# Patient Record
Sex: MALE | Race: WHITE | NOT HISPANIC OR LATINO
[De-identification: names, ages, dates, MRNs, and addresses within clinical notes are randomized per-mention and may not be internally consistent; named-entity substitution may affect disease eponyms.]

---

## 2019-01-29 ENCOUNTER — TRANSCRIPTION ENCOUNTER (OUTPATIENT)
Age: 54
End: 2019-01-29

## 2020-07-21 PROBLEM — Z00.00 ENCOUNTER FOR PREVENTIVE HEALTH EXAMINATION: Status: ACTIVE | Noted: 2020-07-21

## 2020-07-22 DIAGNOSIS — U07.1 COVID-19: ICD-10-CM

## 2020-08-18 LAB — SARS-COV-2 N GENE NPH QL NAA+PROBE: NOT DETECTED

## 2020-08-20 ENCOUNTER — APPOINTMENT (OUTPATIENT)
Dept: NEUROLOGY | Facility: CLINIC | Age: 55
End: 2020-08-20
Payer: COMMERCIAL

## 2020-08-20 PROCEDURE — 95911 NRV CNDJ TEST 9-10 STUDIES: CPT

## 2020-08-20 PROCEDURE — 95886 MUSC TEST DONE W/N TEST COMP: CPT

## 2020-09-25 ENCOUNTER — APPOINTMENT (OUTPATIENT)
Dept: ENDOCRINOLOGY | Facility: CLINIC | Age: 55
End: 2020-09-25
Payer: COMMERCIAL

## 2020-09-25 VITALS
DIASTOLIC BLOOD PRESSURE: 70 MMHG | HEIGHT: 72 IN | SYSTOLIC BLOOD PRESSURE: 110 MMHG | BODY MASS INDEX: 22.89 KG/M2 | TEMPERATURE: 97.3 F | WEIGHT: 169 LBS

## 2020-09-25 DIAGNOSIS — Z86.39 PERSONAL HISTORY OF OTHER ENDOCRINE, NUTRITIONAL AND METABOLIC DISEASE: ICD-10-CM

## 2020-09-25 DIAGNOSIS — Z87.39 PERSONAL HISTORY OF OTHER DISEASES OF THE MUSCULOSKELETAL SYSTEM AND CONNECTIVE TISSUE: ICD-10-CM

## 2020-09-25 DIAGNOSIS — Z78.9 OTHER SPECIFIED HEALTH STATUS: ICD-10-CM

## 2020-09-25 DIAGNOSIS — Z83.3 FAMILY HISTORY OF DIABETES MELLITUS: ICD-10-CM

## 2020-09-25 DIAGNOSIS — Z86.79 PERSONAL HISTORY OF OTHER DISEASES OF THE CIRCULATORY SYSTEM: ICD-10-CM

## 2020-09-25 DIAGNOSIS — Z80.9 FAMILY HISTORY OF MALIGNANT NEOPLASM, UNSPECIFIED: ICD-10-CM

## 2020-09-25 PROCEDURE — 99205 OFFICE O/P NEW HI 60 MIN: CPT

## 2020-09-25 NOTE — HISTORY OF PRESENT ILLNESS
[FreeTextEntry1] : quality: dm \par onset 2010\par severity: moderate \par modifying factors: diet helps and exercise \par associated factors: neuropathy , retinopathy , ED \par

## 2020-09-25 NOTE — ASSESSMENT
[FreeTextEntry1] : Diabetes mellitus severely uncontrolled in patient with longstanding diabetes for 10 years, treated with multiple oral agents and lantus and now stopped completely. His bgs are all 250-300 and above. No weight loss and now blurry vision. He has proliferative / preproliferative diabetic retinopathy and he is getting injection with avastin. \par he has significant UE and LE neuropathy. \par he stopped lantus because it gave him skin numbness and he could not tolerate the feeling. \par test labs today\par start checking Bgs 4x day \par gluccometer, supplies will give scripts. \par send logbook in 1 week and followup again with us 4 weeks\par check CMP stat to rule out acidoses \par test FELA and ICA and C peptide to rule out type 1 dm \par check lipids. \par start tresiba  10 u HS and increase by 2 units daily till morning bgs 140-150. \par discussed adding meal insulin TID \par discussed different  types of insulin \par discussed diet and exercise\par encouraged more exercise walking 30 min 3 x week\par Discussed complications of diabetes at length including MI/CVA/ESRD/dialysis/blindness/amputations/infections\par Needs to try to have more protein for meals.\par Importance of blood glucose monitoring discussed. Targets reviewed. Recommended pt try to keep blood glucose level below 130 (110) before meals and below 160 (140) two hours after meals.\par stop all supplements from [facility] \par check GFR\par eduard/ c ratio \par flushot recommended \par pt warned that labs are stat and if suggestive of DKA will call to end him to ER \par feet exam : diabetic dermopathy with absent vibration from ankle down B/L and minimal monofilament B/L. \par PDR : followup with ophthalmology/retinal specialist

## 2020-09-25 NOTE — PHYSICAL EXAM
[Alert] : alert [Well Nourished] : well nourished [No Acute Distress] : no acute distress [Well Developed] : well developed [Normal Sclera/Conjunctiva] : normal sclera/conjunctiva [EOMI] : extra ocular movement intact [No Proptosis] : no proptosis [Normal Oropharynx] : the oropharynx was normal [Thyroid Not Enlarged] : the thyroid was not enlarged [No Thyroid Nodules] : no palpable thyroid nodules [No Respiratory Distress] : no respiratory distress [No Accessory Muscle Use] : no accessory muscle use [Clear to Auscultation] : lungs were clear to auscultation bilaterally [Normal S1, S2] : normal S1 and S2 [Normal Rate] : heart rate was normal [Regular Rhythm] : with a regular rhythm [No Edema] : no peripheral edema [Pedal Pulses Normal] : the pedal pulses are present [Normal Bowel Sounds] : normal bowel sounds [Not Tender] : non-tender [Not Distended] : not distended [Soft] : abdomen soft [Normal Anterior Cervical Nodes] : no anterior cervical lymphadenopathy [Normal Posterior Cervical Nodes] : no posterior cervical lymphadenopathy [Spine Straight] : spine straight [No Stigmata of Cushings Syndrome] : no stigmata of Cushings Syndrome [Normal Gait] : normal gait [No Rash] : no rash [Normal Reflexes] : deep tendon reflexes were 2+ and symmetric [No Tremors] : no tremors [Oriented x3] : oriented to person, place, and time [Acanthosis Nigricans] : no acanthosis nigricans [de-identified] : R thumb muscle atrophy

## 2020-09-25 NOTE — REVIEW OF SYSTEMS
[Pain/Numbness of Digits] : pain/numbness of digits [Poor Balance] : poor balance [Negative] : Heme/Lymph [de-identified] : healed blisters fingers, crusted

## 2020-09-28 LAB
ALBUMIN SERPL ELPH-MCNC: 4.8 G/DL
ALP BLD-CCNC: 101 U/L
ALT SERPL-CCNC: 25 U/L
ANION GAP SERPL CALC-SCNC: 15 MMOL/L
AST SERPL-CCNC: 17 U/L
BASOPHILS # BLD AUTO: 0.04 K/UL
BASOPHILS NFR BLD AUTO: 0.6 %
BILIRUB SERPL-MCNC: 0.8 MG/DL
BUN SERPL-MCNC: 27 MG/DL
C PEPTIDE SERPL-MCNC: 1.2 NG/ML
CALCIUM SERPL-MCNC: 10 MG/DL
CHLORIDE SERPL-SCNC: 95 MMOL/L
CHOLEST SERPL-MCNC: 259 MG/DL
CHOLEST/HDLC SERPL: 4.9 RATIO
CO2 SERPL-SCNC: 26 MMOL/L
CREAT SERPL-MCNC: 1.12 MG/DL
CREAT SPEC-SCNC: 77 MG/DL
EOSINOPHIL # BLD AUTO: 0.21 K/UL
EOSINOPHIL NFR BLD AUTO: 3 %
ESTIMATED AVERAGE GLUCOSE: 315 MG/DL
GLUCOSE SERPL-MCNC: 361 MG/DL
HBA1C MFR BLD HPLC: 12.6 %
HCT VFR BLD CALC: 39.2 %
HDLC SERPL-MCNC: 53 MG/DL
HGB BLD-MCNC: 13.5 G/DL
IMM GRANULOCYTES NFR BLD AUTO: 0.3 %
LDLC SERPL CALC-MCNC: 173 MG/DL
LDLC SERPL DIRECT ASSAY-MCNC: 182 MG/DL
LYMPHOCYTES # BLD AUTO: 1.09 K/UL
LYMPHOCYTES NFR BLD AUTO: 15.5 %
MAN DIFF?: NORMAL
MCHC RBC-ENTMCNC: 30.5 PG
MCHC RBC-ENTMCNC: 34.4 GM/DL
MCV RBC AUTO: 88.7 FL
MICROALBUMIN 24H UR DL<=1MG/L-MCNC: 19.4 MG/DL
MICROALBUMIN/CREAT 24H UR-RTO: 251 MG/G
MONOCYTES # BLD AUTO: 0.55 K/UL
MONOCYTES NFR BLD AUTO: 7.8 %
NEUTROPHILS # BLD AUTO: 5.11 K/UL
NEUTROPHILS NFR BLD AUTO: 72.8 %
PLATELET # BLD AUTO: 163 K/UL
POTASSIUM SERPL-SCNC: 4.3 MMOL/L
PROT SERPL-MCNC: 6.7 G/DL
RBC # BLD: 4.42 M/UL
RBC # FLD: 12.2 %
SODIUM SERPL-SCNC: 136 MMOL/L
TRIGL SERPL-MCNC: 162 MG/DL
WBC # FLD AUTO: 7.02 K/UL

## 2020-09-28 RX ORDER — BLOOD SUGAR DIAGNOSTIC
STRIP MISCELLANEOUS
Qty: 4 | Refills: 1 | Status: DISCONTINUED | COMMUNITY
Start: 2020-09-25 | End: 2020-09-28

## 2020-09-30 LAB
GAD65 AB SER-MCNC: 0 NMOL/L
PANC ISLET CELL AB SER QL: NORMAL

## 2020-10-29 ENCOUNTER — APPOINTMENT (OUTPATIENT)
Dept: ENDOCRINOLOGY | Facility: CLINIC | Age: 55
End: 2020-10-29
Payer: COMMERCIAL

## 2020-10-29 PROCEDURE — 99214 OFFICE O/P EST MOD 30 MIN: CPT | Mod: 95

## 2020-10-29 NOTE — REVIEW OF SYSTEMS
[Blurred Vision] : blurred vision [Pain/Numbness of Digits] : pain/numbness of digits [Recent Weight Gain (___ Lbs)] : no recent weight gain [Recent Weight Loss (___ Lbs)] : no recent weight loss [Chest Pain] : no chest pain [Palpitations] : no palpitations [Shortness Of Breath] : no shortness of breath [Nausea] : no nausea [Abdominal Pain] : no abdominal pain [Vomiting] : no vomiting [Polyuria] : no polyuria [Polydipsia] : no polydipsia

## 2020-10-29 NOTE — HISTORY OF PRESENT ILLNESS
[FreeTextEntry1] : This visit was provided via telehealth using real-time 2-way audio visual technology. The patient, HAILEY CARD , was located in his car at [address] [place] [address] , at the time of the visit. \par The provider, MICHAEL PATEL, was located at the medical office located in [place], [place] at the time of the visit. The patient, HAILEY CARD and Provider participated in the telehealth encounter. \par Verbal consent given on 10/29/2020 by the patient. \par \par Telehealth visit performed due to COVID-19 Pandemic.\par Time started: 11:20\par Time Ended:  11:42\par  \par Visit converted to telehealth because patient reported travel to [place] within the past two weeks.\par In [place] Oct 5-23 and unable to get insulin while he was away. Resumed Tresiba last Saturday. Only ever took 2 doses of Humalog because he said the pen was jammed.\par Nerve damage in right hand- difficulty closing hand, weak , dropping things x 1 year- planning on surgery but needs to improve BG first.\par \par Quality: type 2\par Onset: 2010\par Severity: severe\par  \par Modifying factors: \par Tresiba 20 units daily\par Humalog 10 units AC before lunch (largest meal)\par \par Diet: breakfast- fruit, lunch is largest meal (sandwich or potato salad), dinner eats late (meatballs).\par Exercise: none\par Weight: stable, fluctuates +/- 5 pounds\par  \par Associated factors: neuropathy , retinopathy , ED \par Eye exam: +PDR, getting Avastin injections. Has follow-up next week for laser therapy. Floaters/blurry vision is improving.\par Foot exam: +neuropathy. symptoms improving since starting insulin\par Heart: none\par Kidney: none\par \par SMBG 1-2x daily, fasting in AM and after meals if not feeling well.\par FBG 190s to mid-200s\par PPBG 330s about one hour after eating.\par

## 2020-10-29 NOTE — ASSESSMENT
[FreeTextEntry1] : 55 year old male with long standing diabetes, type 1 vs type 2. FELA negative but low insulin production. Also with FH (father) with diabetes. BMI normal, was never overweight. Glycemic control is poor and he needs to continue with insulin. Also with associated neuropathy and retinopathy. Needs surgery on left hand but will need to improve glycemic control first.\par \par 1. DM- Type 1 vs type 2\par -Reviewed risks/complications of uncontrolled blood glucose.\par -Continue Tresiba 20 units daily.\par -Resume Humalog 10 units before lunch. Start 5 units before dinner.\par -Continue SMBG but increase to 4x daily, fasting and before each insulin injection (lunch, dinner, and bedtime).\par -Send glucose logs in one week for further insulin adjustments.\par -Reviewed signs/symptoms/treatment of hypoglycemia.\par -Continue follow-up with opthalmology.\par -Repeat A1c and RTO for follow-up in 6 weeks.\par -Would benefit from appointment with CDE in the future.\par \par 2. Hyperlipidemia\par -Continue statin.\par -Repeat lipids in 3 months.

## 2020-11-19 ENCOUNTER — NON-APPOINTMENT (OUTPATIENT)
Age: 55
End: 2020-11-19

## 2020-11-19 RX ORDER — INSULIN LISPRO 100 U/ML
100 INJECTION, SOLUTION SUBCUTANEOUS
Qty: 2 | Refills: 0 | Status: DISCONTINUED | COMMUNITY
Start: 2020-11-19 | End: 2020-11-19

## 2020-11-24 ENCOUNTER — TRANSCRIPTION ENCOUNTER (OUTPATIENT)
Age: 55
End: 2020-11-24

## 2020-12-16 ENCOUNTER — NON-APPOINTMENT (OUTPATIENT)
Age: 55
End: 2020-12-16

## 2021-02-03 ENCOUNTER — APPOINTMENT (OUTPATIENT)
Dept: ENDOCRINOLOGY | Facility: CLINIC | Age: 56
End: 2021-02-03

## 2021-02-16 ENCOUNTER — APPOINTMENT (OUTPATIENT)
Dept: ENDOCRINOLOGY | Facility: CLINIC | Age: 56
End: 2021-02-16
Payer: COMMERCIAL

## 2021-02-16 VITALS
WEIGHT: 177 LBS | SYSTOLIC BLOOD PRESSURE: 112 MMHG | DIASTOLIC BLOOD PRESSURE: 74 MMHG | TEMPERATURE: 96.7 F | HEIGHT: 72 IN | BODY MASS INDEX: 23.98 KG/M2

## 2021-02-16 DIAGNOSIS — E10.40 TYPE 1 DIABETES MELLITUS WITH DIABETIC NEUROPATHY, UNSPECIFIED: ICD-10-CM

## 2021-02-16 DIAGNOSIS — E10.311: ICD-10-CM

## 2021-02-16 DIAGNOSIS — E10.65 TYPE 1 DIABETES MELLITUS WITH DIABETIC NEUROPATHY, UNSPECIFIED: ICD-10-CM

## 2021-02-16 PROCEDURE — 99072 ADDL SUPL MATRL&STAF TM PHE: CPT

## 2021-02-16 PROCEDURE — 99214 OFFICE O/P EST MOD 30 MIN: CPT

## 2021-02-16 RX ORDER — BLOOD-GLUCOSE METER
W/DEVICE EACH MISCELLANEOUS
Qty: 1 | Refills: 0 | Status: ACTIVE | COMMUNITY
Start: 2020-09-28 | End: 1900-01-01

## 2021-02-16 RX ORDER — LANCETS 33 GAUGE
EACH MISCELLANEOUS
Qty: 4 | Refills: 1 | Status: ACTIVE | COMMUNITY
Start: 2020-09-28 | End: 1900-01-01

## 2021-02-16 NOTE — HISTORY OF PRESENT ILLNESS
[FreeTextEntry1] : Was checking blood sugars often but left meter in [place]. Pt was in [place] for a month and returned 2 weeks ago.\par \par Quality: type 2\par Onset: 2010\par Severity: severe\par Modifying factors: on insulin\par \par SMBG\par Checks blood sugars on average 3x a day\par Fasting on average 180-220\par On average post prandial 2-3 hours after 250s-300s\par \par Current drug regimen\par Tresiba 20 units QHS \par Humalog 3-5 units before each meal  (went to [place] without taking this) \par \par **admits to often forgetting the meal insulin because he eats out a lot"\par \par Eye exam: goes monthly for injections +DR\par Foot exam: does not see a podiatry- endorses numbness/tingling in b/l feet \par Kidney disease: denies\par Heart disease:denies\par \par Weight: gaining approx 10 lbs since September \par Diet: indulged in the last month drinking and eating while on vacation in [place]\par admits to binge eating\par B: doesn’t always eat breakfast \par L: BLT, hamburger on lettuce\par D: whole wheat pasta, steak/salad/baked potatoe\par S: admits this is his downfall- potato chips and dips\par Drinks: enjoys beer\par Exercise: does not exercise \par Smoking: denies\par \par HLD\par Need updated lipid panel\par Atorvastatin 20 mg daily

## 2021-02-16 NOTE — ASSESSMENT
[FreeTextEntry1] : T2DM\par -Long discussion had with patient regarding insulin action times. He has often been taking his humalog hours after his meal because he forgot to bring the pen to the restaurant. \par -For now, blood sugars not at goal\par -Increase Tresiba to 25 units QHS\par -Continue Humalog 3-5 units pre meal, must take 100% of the time 10-15 mins before meals, not after meals\par -Watch diet and the alcohol consumption as they make blood sugars fluctuate\par -Increase exercise as tolerated, goal of 30 mins a day 5x a week\par -Continue to follow up with all specialists including ophtho\par \par HLD\par -Continue statin daily, need updated lipid panel\par \par \par Fasting labs due asap\par RTO 3 months

## 2021-02-16 NOTE — REVIEW OF SYSTEMS
[Recent Weight Gain (___ Lbs)] : recent weight gain: [unfilled] lbs [Visual Field Defect] : visual field defect [Blurred Vision] : blurred vision [SOB on Exertion] : shortness of breath on exertion [Hesistancy] : hesitancy [Fatigue] : no fatigue [Dysphagia] : no dysphagia [Dysphonia] : no dysphonia [Chest Pain] : no chest pain [Constipation] : no constipation [Diarrhea] : no diarrhea [Polyuria] : no polyuria [Polydipsia] : no polydipsia

## 2021-03-10 ENCOUNTER — NON-APPOINTMENT (OUTPATIENT)
Age: 56
End: 2021-03-10

## 2021-04-13 ENCOUNTER — APPOINTMENT (OUTPATIENT)
Dept: ENDOCRINOLOGY | Facility: CLINIC | Age: 56
End: 2021-04-13
Payer: COMMERCIAL

## 2021-04-13 VITALS
HEIGHT: 72 IN | SYSTOLIC BLOOD PRESSURE: 110 MMHG | DIASTOLIC BLOOD PRESSURE: 70 MMHG | BODY MASS INDEX: 24.11 KG/M2 | WEIGHT: 178 LBS

## 2021-04-13 PROCEDURE — 99214 OFFICE O/P EST MOD 30 MIN: CPT

## 2021-04-13 PROCEDURE — 99072 ADDL SUPL MATRL&STAF TM PHE: CPT

## 2021-04-13 RX ORDER — BLOOD SUGAR DIAGNOSTIC
STRIP MISCELLANEOUS
Qty: 4 | Refills: 1 | Status: ACTIVE | COMMUNITY
Start: 2020-09-28 | End: 1900-01-01

## 2021-04-13 NOTE — ASSESSMENT
[FreeTextEntry1] : T2DM\par -Long discussion had with patient regarding the need for tight diabetes control both for short term  healing of wounds and long term prevention of further DM complications \par -For now, blood sugars not at goal\par -Increase FS frequency to 3-4x a day and drop off logs in 2 weeks\par -Increase Tresiba to 26 units QHS\par -Increase Humalog 8 units pre meal, must take 100% of the time 10-15 mins before meals, not after meals\par -Watch diet and the alcohol consumption as they make blood sugars fluctuate\par -Increase exercise as tolerated, goal of 30 mins a day 5x a week when cleared\par -Continue to follow up with all specialists including ophtho and podiatry\par \par HLD\par -Continue statin daily, need updated lipid panel\par \par \par Fasting labs due asap\par RTO 3 months

## 2021-04-13 NOTE — PHYSICAL EXAM
[Alert] : alert [Well Nourished] : well nourished [No Acute Distress] : no acute distress [Normal Sclera/Conjunctiva] : normal sclera/conjunctiva [Normal Hearing] : hearing was normal [Thyroid Not Enlarged] : the thyroid was not enlarged [No Accessory Muscle Use] : no accessory muscle use [Clear to Auscultation] : lungs were clear to auscultation bilaterally [Normal S1, S2] : normal S1 and S2 [Normal Rate] : heart rate was normal [No Edema] : no peripheral edema [Not Tender] : non-tender [Soft] : abdomen soft [Normal Gait] : normal gait [No Rash] : no rash [No Tremors] : no tremors [Oriented x3] : oriented to person, place, and time [Foot Ulcers] : foot ulcers present
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
## 2021-05-06 ENCOUNTER — LABORATORY RESULT (OUTPATIENT)
Age: 56
End: 2021-05-06

## 2021-05-10 LAB
25(OH)D3 SERPL-MCNC: 27.5 NG/ML
ALBUMIN SERPL ELPH-MCNC: 4.4 G/DL
ALP BLD-CCNC: 111 U/L
ALT SERPL-CCNC: 21 U/L
ANION GAP SERPL CALC-SCNC: 11 MMOL/L
AST SERPL-CCNC: 18 U/L
BASOPHILS # BLD AUTO: 0.02 K/UL
BASOPHILS NFR BLD AUTO: 0.3 %
BILIRUB SERPL-MCNC: 0.8 MG/DL
BUN SERPL-MCNC: 24 MG/DL
CALCIUM SERPL-MCNC: 10.7 MG/DL
CHLORIDE SERPL-SCNC: 102 MMOL/L
CHOLEST SERPL-MCNC: 252 MG/DL
CO2 SERPL-SCNC: 27 MMOL/L
CREAT SERPL-MCNC: 1.33 MG/DL
CREAT SPEC-SCNC: 142 MG/DL
EOSINOPHIL # BLD AUTO: 0.13 K/UL
EOSINOPHIL NFR BLD AUTO: 2.2 %
ESTIMATED AVERAGE GLUCOSE: 246 MG/DL
GLUCOSE SERPL-MCNC: 91 MG/DL
HBA1C MFR BLD HPLC: 10.2 %
HCT VFR BLD CALC: 38.2 %
HDLC SERPL-MCNC: 60 MG/DL
HGB BLD-MCNC: 12.6 G/DL
IMM GRANULOCYTES NFR BLD AUTO: 0.3 %
LDLC SERPL CALC-MCNC: 175 MG/DL
LYMPHOCYTES # BLD AUTO: 1.21 K/UL
LYMPHOCYTES NFR BLD AUTO: 20.2 %
MAN DIFF?: NORMAL
MCHC RBC-ENTMCNC: 28.7 PG
MCHC RBC-ENTMCNC: 33 GM/DL
MCV RBC AUTO: 87 FL
MICROALBUMIN 24H UR DL<=1MG/L-MCNC: 77.2 MG/DL
MICROALBUMIN/CREAT 24H UR-RTO: 544 MG/G
MONOCYTES # BLD AUTO: 0.42 K/UL
MONOCYTES NFR BLD AUTO: 7 %
NEUTROPHILS # BLD AUTO: 4.19 K/UL
NEUTROPHILS NFR BLD AUTO: 70 %
NONHDLC SERPL-MCNC: 192 MG/DL
PLATELET # BLD AUTO: 198 K/UL
POTASSIUM SERPL-SCNC: 4.7 MMOL/L
PROT SERPL-MCNC: 6.7 G/DL
RBC # BLD: 4.39 M/UL
RBC # FLD: 12.9 %
SODIUM SERPL-SCNC: 140 MMOL/L
T3FREE SERPL-MCNC: 2.82 PG/ML
T4 FREE SERPL-MCNC: 1.2 NG/DL
TRIGL SERPL-MCNC: 87 MG/DL
TSH SERPL-ACNC: 3.4 UIU/ML
VIT B12 SERPL-MCNC: 449 PG/ML
WBC # FLD AUTO: 5.99 K/UL

## 2021-05-11 ENCOUNTER — LABORATORY RESULT (OUTPATIENT)
Age: 56
End: 2021-05-11

## 2021-05-13 ENCOUNTER — APPOINTMENT (OUTPATIENT)
Dept: ENDOCRINOLOGY | Facility: CLINIC | Age: 56
End: 2021-05-13

## 2021-05-26 ENCOUNTER — APPOINTMENT (OUTPATIENT)
Dept: ENDOCRINOLOGY | Facility: CLINIC | Age: 56
End: 2021-05-26
Payer: COMMERCIAL

## 2021-05-26 VITALS
WEIGHT: 178 LBS | BODY MASS INDEX: 24.11 KG/M2 | SYSTOLIC BLOOD PRESSURE: 110 MMHG | DIASTOLIC BLOOD PRESSURE: 70 MMHG | HEIGHT: 72 IN

## 2021-05-26 PROCEDURE — 99214 OFFICE O/P EST MOD 30 MIN: CPT

## 2021-05-26 PROCEDURE — 99072 ADDL SUPL MATRL&STAF TM PHE: CPT
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
## 2021-05-26 NOTE — PHYSICAL EXAM
[Alert] : alert [Well Nourished] : well nourished [No Acute Distress] : no acute distress [Normal Sclera/Conjunctiva] : normal sclera/conjunctiva [Normal Hearing] : hearing was normal [Thyroid Not Enlarged] : the thyroid was not enlarged [No Accessory Muscle Use] : no accessory muscle use [Clear to Auscultation] : lungs were clear to auscultation bilaterally [Normal S1, S2] : normal S1 and S2 [Normal Rate] : heart rate was normal [No Edema] : no peripheral edema [Not Tender] : non-tender [Soft] : abdomen soft [Normal Gait] : normal gait [No Rash] : no rash [Foot Ulcers] : foot ulcers present [No Tremors] : no tremors [Oriented x3] : oriented to person, place, and time

## 2021-05-26 NOTE — REVIEW OF SYSTEMS
Refill approved per Dr. Nolan Murry, sent to pharmacy. Informed pt, confirmed 12/18/19 OV and pt expressed understanding and agreement. Task completed. [Polyuria] : polyuria [Polydipsia] : polydipsia [Fatigue] : no fatigue [Recent Weight Gain (___ Lbs)] : no recent weight gain [Recent Weight Loss (___ Lbs)] : no recent weight loss [Chest Pain] : no chest pain [Shortness Of Breath] : no shortness of breath [Constipation] : no constipation [Diarrhea] : no diarrhea [FreeTextEntry8] : when BS high [de-identified] : when BS high

## 2021-06-03 ENCOUNTER — APPOINTMENT (OUTPATIENT)
Dept: DERMATOLOGY | Facility: CLINIC | Age: 56
End: 2021-06-03
Payer: COMMERCIAL

## 2021-06-03 PROCEDURE — 99072 ADDL SUPL MATRL&STAF TM PHE: CPT

## 2021-06-03 PROCEDURE — 99205 OFFICE O/P NEW HI 60 MIN: CPT

## 2021-06-08 ENCOUNTER — APPOINTMENT (OUTPATIENT)
Dept: PLASTIC SURGERY | Facility: HOSPITAL | Age: 56
End: 2021-06-08

## 2021-06-09 ENCOUNTER — APPOINTMENT (OUTPATIENT)
Dept: DERMATOLOGY | Facility: CLINIC | Age: 56
End: 2021-06-09

## 2021-06-15 ENCOUNTER — APPOINTMENT (OUTPATIENT)
Dept: SURGERY | Facility: HOSPITAL | Age: 56
End: 2021-06-15
Payer: COMMERCIAL

## 2021-06-15 ENCOUNTER — OUTPATIENT (OUTPATIENT)
Dept: OUTPATIENT SERVICES | Facility: HOSPITAL | Age: 56
LOS: 1 days | Discharge: HOME | End: 2021-06-15
Payer: COMMERCIAL

## 2021-06-15 VITALS
WEIGHT: 178 LBS | OXYGEN SATURATION: 98 % | BODY MASS INDEX: 24.11 KG/M2 | HEIGHT: 72 IN | TEMPERATURE: 97.1 F | SYSTOLIC BLOOD PRESSURE: 154 MMHG | RESPIRATION RATE: 20 BRPM | HEART RATE: 86 BPM | DIASTOLIC BLOOD PRESSURE: 93 MMHG

## 2021-06-15 DIAGNOSIS — S60.521D: ICD-10-CM

## 2021-06-15 DIAGNOSIS — E11.621 TYPE 2 DIABETES MELLITUS WITH FOOT ULCER: ICD-10-CM

## 2021-06-15 DIAGNOSIS — S61.402A UNSPECIFIED OPEN WOUND OF LEFT HAND, INITIAL ENCOUNTER: ICD-10-CM

## 2021-06-15 PROCEDURE — G0463: CPT

## 2021-06-15 PROCEDURE — 99203 OFFICE O/P NEW LOW 30 MIN: CPT

## 2021-06-15 NOTE — VITALS
[Pain related to present condition?] : The patient's  pain is not related to present condition. [] : No [de-identified] : 0

## 2021-06-15 NOTE — PLAN
[FreeTextEntry1] : No wound care needed, I asked the patient to have F/U with his endocrinologist and dermatologist, return to office PRN.\par 35 minutes spent for patient care and medical decision making.\par

## 2021-06-15 NOTE — HISTORY OF PRESENT ILLNESS
[FreeTextEntry1] : 56 year old male presents to the [facility] with right hand blisters in various stages of healing. The patient reports blisters randomly appear on his hands. He has a history of diabetes and has been to several doctors to figure out the cause. He covers the blisters with band-aids when they appear and they take several weeks to heal. The current blisters appeared 3 weeks ago.\par \par \par Patient with uncontrol DM, has had several weeks history of multiple blister on the palm and fingers, they break and heal. patient has seen dermatologist with no definite diagnosis. SENTHIL

## 2021-06-15 NOTE — ASSESSMENT
[Verbal] : Verbal [Patient] : Patient [Fair - mild discomfort, physical impairment, low acceptance] : Fair - mild discomfort, physical impairment, low acceptance [Verbalizes knowledge/Understanding] : Verbalizes knowledge/understanding [Dressing changes] : dressing changes [Signs and symptoms of infection] : sign and symptoms of infection [Discharge Planning] : discharge planning [Patient responsibility to plan of care] : patient responsibility to plan of care [Glycemic Control] : glycemic control [Stable] : stable [Home] : Home [Ambulatory] : Ambulatory [Not Applicable - Long Term Care/Home Health Agency] : Long Term Care/Home Health Agency: Not Applicable [] : No [FreeTextEntry2] : Infection Prevention\par Glycemic Control\par Discharge [FreeTextEntry4] : Patient discharged [FreeTextEntry1] : Hand blisters, could be related to DM, no acute infection\par

## 2021-06-15 NOTE — PHYSICAL EXAM
[Normal Breath Sounds] : Normal breath sounds [Normal Heart Sounds] : normal heart sounds [2+] : left 2+ [Ankle Swelling Bilaterally] : bilaterally  [Alert] : alert [Oriented to Person] : oriented to person [Calm] : calm [JVD] : no jugular venous distention  [Ankle Swelling (On Exam)] : not present [Varicose Veins Of Lower Extremities] : not present [] : not present [de-identified] : WD/WN in no acute distress. [de-identified] : Within Pricila Limits. [de-identified] : Within Pricila Limits. [de-identified] : Within Pricila Limits. [de-identified] : Bilateral healed blister eschar on the palm and finger, no blister is present at the moment, no infection, no drainage.  [FreeTextEntry1] : Hand- scattered scabs in various stages of healing. [de-identified] : No treatment required  [de-identified] : Cleansed with Normal saline\par  [TWNoteComboBox1] : Right

## 2021-06-16 DIAGNOSIS — Z80.9 FAMILY HISTORY OF MALIGNANT NEOPLASM, UNSPECIFIED: ICD-10-CM

## 2021-06-16 DIAGNOSIS — Z86.16 PERSONAL HISTORY OF COVID-19: ICD-10-CM
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
## 2021-06-16 DIAGNOSIS — Z79.899 OTHER LONG TERM (CURRENT) DRUG THERAPY: ICD-10-CM

## 2021-06-16 DIAGNOSIS — E78.5 HYPERLIPIDEMIA, UNSPECIFIED: ICD-10-CM

## 2021-06-16 DIAGNOSIS — Z98.890 OTHER SPECIFIED POSTPROCEDURAL STATES: ICD-10-CM
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
## 2021-06-16 DIAGNOSIS — E11.40 TYPE 2 DIABETES MELLITUS WITH DIABETIC NEUROPATHY, UNSPECIFIED: ICD-10-CM

## 2021-06-16 DIAGNOSIS — Z79.4 LONG TERM (CURRENT) USE OF INSULIN: ICD-10-CM
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
## 2021-06-16 DIAGNOSIS — Z91.048 OTHER NONMEDICINAL SUBSTANCE ALLERGY STATUS: ICD-10-CM

## 2021-06-16 DIAGNOSIS — S60.521D: ICD-10-CM

## 2021-06-16 DIAGNOSIS — I10 ESSENTIAL (PRIMARY) HYPERTENSION: ICD-10-CM

## 2021-06-16 DIAGNOSIS — M19.90 UNSPECIFIED OSTEOARTHRITIS, UNSPECIFIED SITE: ICD-10-CM
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
## 2021-06-16 DIAGNOSIS — Z83.3 FAMILY HISTORY OF DIABETES MELLITUS: ICD-10-CM

## 2021-06-16 DIAGNOSIS — Z90.49 ACQUIRED ABSENCE OF OTHER SPECIFIED PARTS OF DIGESTIVE TRACT: ICD-10-CM

## 2021-06-17 ENCOUNTER — APPOINTMENT (OUTPATIENT)
Dept: DERMATOLOGY | Facility: CLINIC | Age: 56
End: 2021-06-17
Payer: COMMERCIAL

## 2021-06-17 VITALS — WEIGHT: 178 LBS | BODY MASS INDEX: 24.11 KG/M2 | HEIGHT: 72 IN

## 2021-06-17 DIAGNOSIS — T14.8XXA OTHER INJURY OF UNSPECIFIED BODY REGION, INITIAL ENCOUNTER: ICD-10-CM

## 2021-06-17 PROCEDURE — 99072 ADDL SUPL MATRL&STAF TM PHE: CPT

## 2021-06-17 PROCEDURE — 99214 OFFICE O/P EST MOD 30 MIN: CPT
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
## 2021-06-17 NOTE — PHYSICAL EXAM
[Alert] : alert [Oriented x 3] : ~L oriented x 3 [Well Nourished] : well nourished [FreeTextEntry3] : Scars, crusts, mild erythema, fingers of bilateral hands.\par Cellphone photo's of tense, non-inflammatory bullae of the fingers of bilateral hands.\par The dorsal hands, palmar hands, wrists and forearms are spared.
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
## 2021-06-21 ENCOUNTER — TRANSCRIPTION ENCOUNTER (OUTPATIENT)
Age: 56
End: 2021-06-21

## 2021-06-29 ENCOUNTER — APPOINTMENT (OUTPATIENT)
Dept: GASTROENTEROLOGY | Facility: CLINIC | Age: 56
End: 2021-06-29

## 2021-07-08 RX ORDER — GABAPENTIN 100 MG/1
100 CAPSULE ORAL
Qty: 270 | Refills: 0 | Status: ACTIVE | COMMUNITY
Start: 2021-04-09 | End: 1900-01-01

## 2021-08-24 ENCOUNTER — LABORATORY RESULT (OUTPATIENT)
Age: 56
End: 2021-08-24

## 2021-08-24 ENCOUNTER — APPOINTMENT (OUTPATIENT)
Dept: ENDOCRINOLOGY | Facility: CLINIC | Age: 56
End: 2021-08-24
Payer: COMMERCIAL

## 2021-08-24 VITALS
OXYGEN SATURATION: 96 % | HEART RATE: 81 BPM | SYSTOLIC BLOOD PRESSURE: 124 MMHG | BODY MASS INDEX: 26.68 KG/M2 | WEIGHT: 197 LBS | DIASTOLIC BLOOD PRESSURE: 72 MMHG | HEIGHT: 72 IN

## 2021-08-24 LAB — GLUCOSE BLDC GLUCOMTR-MCNC: 166

## 2021-08-24 PROCEDURE — 99214 OFFICE O/P EST MOD 30 MIN: CPT | Mod: 25

## 2021-08-24 PROCEDURE — 82962 GLUCOSE BLOOD TEST: CPT

## 2021-08-24 NOTE — REVIEW OF SYSTEMS
[Recent Weight Gain (___ Lbs)] : recent weight gain: [unfilled] lbs [Fatigue] : no fatigue [Visual Field Defect] : no visual field defect [Blurred Vision] : no blurred vision [Dysphagia] : no dysphagia [Neck Pain] : no neck pain [Chest Pain] : no chest pain [Palpitations] : no palpitations [Shortness Of Breath] : no shortness of breath [Nausea] : no nausea [Constipation] : no constipation [Vomiting] : no vomiting [Diarrhea] : no diarrhea [Polyuria] : no polyuria [Polydipsia] : no polydipsia

## 2021-08-24 NOTE — HISTORY OF PRESENT ILLNESS
[FreeTextEntry1] : Foot injury still healing. He goes to podiatrist, has appt on Friday. Blisters on fingers have been good for 4-5 months \par \par Quality: type 1\par Onset: 2010\par Severity:moderate \par Modifying factors: on insulin\par Father with type 1 DM\par \par SMBG\par Checks blood sugars on average 1-2 times a day \par This \par On average 160-200 in AM\par After meals 250-350\par \par FS in office 166, fasting \par \par HGA1C 10.2 5/2021\par \par Current drug regimen\par Tresiba 24  units QAM \par Humalog 3-6 units before each meal  ( not always for every meal because of not being home) \par \par Sometimes gives 2-5 units of Humalog to correct high blood sugars and doesn’t eat, then causing hypoglycemia\par \par Eye exam: goes monthly for injections +DR, is getting lazer treatment\par Foot exam: now follows closely with podiatry- endorses numbness/tingling in b/l feet, NV Friday \par Kidney disease: denies\par Heart disease:denies\par \par Weight: stable\par Diet: indulged in the last month drinking and eating while on vacation in [place]\par admits to binge eating\par B: doesn’t always eat breakfast \par L: BLT, hamburger on lettuce\par D: whole wheat pasta, steak/salad/baked potatoe\par S: admits this is his downfall- potato chips and dips\par Drinks: enjoys beer\par Exercise: does not exercise , not cleared to exercise\par Smoking: denies\par \par HLD\par Triglyceride 87, Total cholesterol 252, HDL 60, \par Has not been taking statin\par \par Vit D Def\par On labs 27.5\par On no supplementation \par \par No updated Labs

## 2021-08-24 NOTE — PHYSICAL EXAM
[Alert] : alert [Normal Sclera/Conjunctiva] : normal sclera/conjunctiva [Normal Hearing] : hearing was normal [No Neck Mass] : no neck mass was observed [Thyroid Not Enlarged] : the thyroid was not enlarged [No Respiratory Distress] : no respiratory distress [Clear to Auscultation] : lungs were clear to auscultation bilaterally [Normal S1, S2] : normal S1 and S2 [Normal Rate] : heart rate was normal [No Stigmata of Cushings Syndrome] : no stigmata of Cushings Syndrome [Normal Gait] : normal gait [Oriented x3] : oriented to person, place, and time

## 2021-08-24 NOTE — ASSESSMENT
[FreeTextEntry1] : T2DM\par -Long discussion had with patient regarding the need for tight diabetes control both for short term healing of wounds and long term prevention of further DM complications \par -For now, blood sugars not at goal\par -Increase FS frequency to 3-4x a day \par -Increase Tresiba to 28 units but will try taking in AM so he does not fall asleep without taking it\par -Continue Humalog 3-6 units pre meal, must take 100% of the time 10-15 mins before meals, not after meals. Do not perform correction injections at this time. \par -Increase exercise as tolerated, goal of 30 mins a day 5x a week when cleared\par -Continue to follow up with all specialists including ophtho and podiatry\par \par HLD\par -repeat lipids\par \par Vit D Def\par -Repeat levels\par \par Repeat fasting labs now \par RTO 3 months or sooner based on lab results\par

## 2021-10-02 ENCOUNTER — APPOINTMENT (OUTPATIENT)
Age: 56
End: 2021-10-02
Payer: COMMERCIAL

## 2021-10-02 VITALS
SYSTOLIC BLOOD PRESSURE: 146 MMHG | WEIGHT: 180 LBS | TEMPERATURE: 98.1 F | DIASTOLIC BLOOD PRESSURE: 84 MMHG | BODY MASS INDEX: 24.38 KG/M2 | HEART RATE: 80 BPM | HEIGHT: 72 IN

## 2021-10-02 DIAGNOSIS — M70.21 OLECRANON BURSITIS, RIGHT ELBOW: ICD-10-CM

## 2021-10-02 PROCEDURE — 99204 OFFICE O/P NEW MOD 45 MIN: CPT

## 2021-10-02 PROCEDURE — 73080 X-RAY EXAM OF ELBOW: CPT | Mod: RT

## 2021-10-02 RX ORDER — CEPHALEXIN 500 MG/1
500 TABLET ORAL 3 TIMES DAILY
Qty: 30 | Refills: 0 | Status: ACTIVE | COMMUNITY
Start: 2021-10-02 | End: 1900-01-01

## 2021-10-02 NOTE — DISCUSSION/SUMMARY
[de-identified] : At this point in time I do believe he has an active infection of the olecranon bursa of the right elbow.  I want to stop the doxycycline and I prescribed him Keflex 500 mg 3 times a day for the next 10 days.  For pain he will continue to ice the area.  I do not want him resting on his elbows at this time until this infection clears.  I did explain to him that if he feels any systemic symptoms such as fevers, chills, night sweats or the pain increases/redness increases around the area I do want him to go to the emergency department to be evaluated.  If this does not happen I gave him a referral to meet with our sports medicine physician in 1 week to make sure this is declaring itself.  All of his questions were answered and he understood the treatment course at this time.

## 2021-10-02 NOTE — HISTORY OF PRESENT ILLNESS
[de-identified] : The patient is a 56-year-old male who presents with an acute onset of right elbow pain, swelling, and redness around the olecranon bursa of the elbow.  It is very hot, red, and swollen over the olecranon bursa consistent with an infected olecranon bursitis.  He states that he was recently leaning on his elbows and that is when the swelling and pain started within the past week.  He placed himself on doxycycline because he had the medications at home and has been taking them since Tuesday.  He states that the redness and swelling has not improved since then.  He denies fevers, chills, night sweats.  He has no other complaints at this time.  His pain scale in the elbow is a constant 6 out of 10.

## 2021-10-02 NOTE — PHYSICAL EXAM
[de-identified] : Laterility: Right elbow\par \par General: Alert and oriented x3.  In no acute distress.  Pleasant in nature with a normal affect.  No apparent respiratory distress. \par Swelling: Positive swelling, erythema around the olecranon bursa of the right elbow.  This is consistent with an infected olecranon bursa.\par \par ROM: \par Extension: 0 degrees\par Flexion: 145 degrees\par Pronation: 85 degrees\par Supination: 85 degrees\par \par Palpation: \par Lateral Epicondyle: Negative\par Medial Epicondyle: Negative\par Olecranon: Positive.\par Triceps Tendon: Negative\par Distal Biceps Tendon: Negative\par \par Special Tests:\par Dorsiflexion Against Resistance: Negative\par Flexion of Wrist Against Resistance: Negative\par Resistance Against Supination: Negative pain over distal biceps tendon. \par Tinel's Sign Cubital Tunnel: Negative\par \par Neurovascularly Intact with No Motor or Sensory Deficits. [de-identified] : X-rays of the right elbow reviewed, 10/2/2021: Normal right elbow x-rays.

## 2021-10-05 PROBLEM — M70.21 OLECRANON BURSITIS OF RIGHT ELBOW: Status: ACTIVE | Noted: 2021-10-02

## 2021-10-07 ENCOUNTER — APPOINTMENT (OUTPATIENT)
Dept: ORTHOPEDIC SURGERY | Facility: CLINIC | Age: 56
End: 2021-10-07
Payer: COMMERCIAL

## 2021-10-07 VITALS
WEIGHT: 180 LBS | HEIGHT: 72 IN | SYSTOLIC BLOOD PRESSURE: 117 MMHG | BODY MASS INDEX: 24.38 KG/M2 | DIASTOLIC BLOOD PRESSURE: 75 MMHG | HEART RATE: 84 BPM

## 2021-10-07 PROCEDURE — 99213 OFFICE O/P EST LOW 20 MIN: CPT | Mod: 25

## 2021-10-07 PROCEDURE — 20610 DRAIN/INJ JOINT/BURSA W/O US: CPT

## 2021-10-11 LAB
B PERT IGG+IGM PNL SER: ABNORMAL
COLOR FLD: NORMAL
EOSINOPHIL # FLD MANUAL: 0 %
FLUID INTAKE SUBSTANCE CLASS: NORMAL
LYMPHOCYTES # FLD MANUAL: 9 %
MESOTHL CELL NFR FLD: 0 %
MONOS+MACROS NFR FLD MANUAL: 7 %
NEUTS SEG # FLD MANUAL: 84 %
NRBC # FLD: 0
RBC # FLD MANUAL: ABNORMAL /UL
TOTAL CELLS COUNTED FLD: 7519 /UL
TUBE TYPE: NORMAL
UNIDENT CELLS NFR FLD MANUAL: 0 %
VARIANT LYMPHS # FLD MANUAL: 0 %

## 2021-10-21 LAB — BACTERIA FLD CULT: NORMAL

## 2021-10-22 ENCOUNTER — APPOINTMENT (OUTPATIENT)
Dept: ORTHOPEDIC SURGERY | Facility: CLINIC | Age: 56
End: 2021-10-22

## 2021-10-22 DIAGNOSIS — M25.521 PAIN IN RIGHT ELBOW: ICD-10-CM

## 2021-11-22 ENCOUNTER — APPOINTMENT (OUTPATIENT)
Dept: ENDOCRINOLOGY | Facility: CLINIC | Age: 56
End: 2021-11-22

## 2022-08-21 ENCOUNTER — INPATIENT (INPATIENT)
Facility: HOSPITAL | Age: 57
LOS: 8 days | Discharge: HOME | DRG: 854 | End: 2022-08-30
Attending: HOSPITALIST | Admitting: STUDENT IN AN ORGANIZED HEALTH CARE EDUCATION/TRAINING PROGRAM
Payer: COMMERCIAL

## 2022-08-21 VITALS
DIASTOLIC BLOOD PRESSURE: 86 MMHG | OXYGEN SATURATION: 98 % | TEMPERATURE: 99 F | RESPIRATION RATE: 18 BRPM | WEIGHT: 190.04 LBS | SYSTOLIC BLOOD PRESSURE: 183 MMHG | HEART RATE: 95 BPM

## 2022-08-21 LAB
ALBUMIN SERPL ELPH-MCNC: 4 G/DL — SIGNIFICANT CHANGE UP (ref 3.3–5.2)
ALP SERPL-CCNC: 107 U/L — SIGNIFICANT CHANGE UP (ref 40–120)
ALT FLD-CCNC: 24 U/L — SIGNIFICANT CHANGE UP
ANION GAP SERPL CALC-SCNC: 14 MMOL/L — SIGNIFICANT CHANGE UP (ref 5–17)
APTT BLD: 30.3 SEC — SIGNIFICANT CHANGE UP (ref 27.5–35.5)
AST SERPL-CCNC: 27 U/L — SIGNIFICANT CHANGE UP
BASE EXCESS BLDV CALC-SCNC: -1.7 MMOL/L — SIGNIFICANT CHANGE UP (ref -2–3)
BASOPHILS # BLD AUTO: 0.04 K/UL — SIGNIFICANT CHANGE UP (ref 0–0.2)
BASOPHILS NFR BLD AUTO: 0.2 % — SIGNIFICANT CHANGE UP (ref 0–2)
BILIRUB SERPL-MCNC: 0.8 MG/DL — SIGNIFICANT CHANGE UP (ref 0.4–2)
BUN SERPL-MCNC: 42.7 MG/DL — HIGH (ref 8–20)
CA-I SERPL-SCNC: 1.35 MMOL/L — HIGH (ref 1.15–1.33)
CALCIUM SERPL-MCNC: 10.8 MG/DL — HIGH (ref 8.4–10.5)
CHLORIDE BLDV-SCNC: 95 MMOL/L — LOW (ref 98–107)
CHLORIDE SERPL-SCNC: 90 MMOL/L — LOW (ref 98–107)
CO2 SERPL-SCNC: 23 MMOL/L — SIGNIFICANT CHANGE UP (ref 22–29)
CREAT SERPL-MCNC: 2.97 MG/DL — HIGH (ref 0.5–1.3)
CRP SERPL-MCNC: 261 MG/L — HIGH
EGFR: 24 ML/MIN/1.73M2 — LOW
EOSINOPHIL # BLD AUTO: 0.14 K/UL — SIGNIFICANT CHANGE UP (ref 0–0.5)
EOSINOPHIL NFR BLD AUTO: 0.8 % — SIGNIFICANT CHANGE UP (ref 0–6)
GAS PNL BLDV: 126 MMOL/L — LOW (ref 136–145)
GAS PNL BLDV: SIGNIFICANT CHANGE UP
GLUCOSE BLDV-MCNC: 306 MG/DL — HIGH (ref 70–99)
GLUCOSE SERPL-MCNC: 279 MG/DL — HIGH (ref 70–99)
HCO3 BLDV-SCNC: 24 MMOL/L — SIGNIFICANT CHANGE UP (ref 22–29)
HCT VFR BLD CALC: 28.9 % — LOW (ref 39–50)
HCT VFR BLDA CALC: 31 % — SIGNIFICANT CHANGE UP
HGB BLD CALC-MCNC: 10.4 G/DL — LOW (ref 12.6–17.4)
HGB BLD-MCNC: 10.2 G/DL — LOW (ref 13–17)
IMM GRANULOCYTES NFR BLD AUTO: 0.8 % — SIGNIFICANT CHANGE UP (ref 0–1.5)
INR BLD: 1.12 RATIO — SIGNIFICANT CHANGE UP (ref 0.88–1.16)
LACTATE BLDV-MCNC: 1 MMOL/L — SIGNIFICANT CHANGE UP (ref 0.5–2)
LYMPHOCYTES # BLD AUTO: 0.53 K/UL — LOW (ref 1–3.3)
LYMPHOCYTES # BLD AUTO: 3 % — LOW (ref 13–44)
MCHC RBC-ENTMCNC: 30.4 PG — SIGNIFICANT CHANGE UP (ref 27–34)
MCHC RBC-ENTMCNC: 35.3 GM/DL — SIGNIFICANT CHANGE UP (ref 32–36)
MCV RBC AUTO: 86.3 FL — SIGNIFICANT CHANGE UP (ref 80–100)
MONOCYTES # BLD AUTO: 1.18 K/UL — HIGH (ref 0–0.9)
MONOCYTES NFR BLD AUTO: 6.7 % — SIGNIFICANT CHANGE UP (ref 2–14)
NEUTROPHILS # BLD AUTO: 15.49 K/UL — HIGH (ref 1.8–7.4)
NEUTROPHILS NFR BLD AUTO: 88.5 % — HIGH (ref 43–77)
PCO2 BLDV: 42 MMHG — SIGNIFICANT CHANGE UP (ref 42–55)
PH BLDV: 7.36 — SIGNIFICANT CHANGE UP (ref 7.32–7.43)
PLATELET # BLD AUTO: 176 K/UL — SIGNIFICANT CHANGE UP (ref 150–400)
PO2 BLDV: 47 MMHG — HIGH (ref 25–45)
POTASSIUM BLDV-SCNC: 5.2 MMOL/L — HIGH (ref 3.5–5.1)
POTASSIUM SERPL-MCNC: 5 MMOL/L — SIGNIFICANT CHANGE UP (ref 3.5–5.3)
POTASSIUM SERPL-SCNC: 5 MMOL/L — SIGNIFICANT CHANGE UP (ref 3.5–5.3)
PROT SERPL-MCNC: 7.3 G/DL — SIGNIFICANT CHANGE UP (ref 6.6–8.7)
PROTHROM AB SERPL-ACNC: 13 SEC — SIGNIFICANT CHANGE UP (ref 10.5–13.4)
RBC # BLD: 3.35 M/UL — LOW (ref 4.2–5.8)
RBC # FLD: 11.9 % — SIGNIFICANT CHANGE UP (ref 10.3–14.5)
SAO2 % BLDV: 80.8 % — SIGNIFICANT CHANGE UP
SODIUM SERPL-SCNC: 127 MMOL/L — LOW (ref 135–145)
WBC # BLD: 17.52 K/UL — HIGH (ref 3.8–10.5)
WBC # FLD AUTO: 17.52 K/UL — HIGH (ref 3.8–10.5)

## 2022-08-21 PROCEDURE — 99285 EMERGENCY DEPT VISIT HI MDM: CPT

## 2022-08-21 RX ORDER — SODIUM CHLORIDE 9 MG/ML
1000 INJECTION INTRAMUSCULAR; INTRAVENOUS; SUBCUTANEOUS ONCE
Refills: 0 | Status: COMPLETED | OUTPATIENT
Start: 2022-08-21 | End: 2022-08-21

## 2022-08-21 RX ORDER — MORPHINE SULFATE 50 MG/1
4 CAPSULE, EXTENDED RELEASE ORAL ONCE
Refills: 0 | Status: DISCONTINUED | OUTPATIENT
Start: 2022-08-21 | End: 2022-08-21

## 2022-08-21 NOTE — ED STATDOCS - PHYSICAL EXAMINATION
Const: Awake, alert and oriented. In no acute distress. Well appearing.  HEENT: NC/AT. Moist mucous membranes.  Eyes: No scleral icterus. EOMI.  Neck:. Soft and supple. Full ROM without pain.  Cardiac: Regular rate and regular rhythm. +S1/S2. Peripheral pulses 2+ and symmetric. No LE edema.  Resp: Speaking in full sentences. No evidence of respiratory distress. No wheezes, rales or rhonchi.  Abd: Soft, non-tender, non-distended. Normal bowel sounds in all 4 quadrants. No guarding or rebound.  Back: Spine midline and non-tender. No CVAT.  Skin: No rashes, abrasions or lacerations.  Lymph: No cervical lymphadenopathy.  Neuro: Awake, alert & oriented x 3. Moves all extremities symmetrically.  MSK: erythema along the medial knee down to the medial mid tib fib mild ttp mild edema no palpable affusion full rom of knee

## 2022-08-21 NOTE — ED STATDOCS - PROGRESS NOTE DETAILS
Keyona LING :  PT evaluated by intake physician. HPI/PE/ROS as noted above. Will follow up plan per intake physician  WBC 17 elev ESR/CRP  consulted ortho r/o septic knee  pt also with ESTUARDO Cr 2.6 no prior hx kidney problems known to pt, last recent lab Cr 1.3 one year ago  per dr diez hold off on iv abx pending arthrocentesis pt was seen by ortho, arthrocentesis done joint fluid does not appear infected  spiked fever 101F  will admit for IV abx d/w hospitalist for further management

## 2022-08-21 NOTE — ED STATDOCS - CLINICAL SUMMARY MEDICAL DECISION MAKING FREE TEXT BOX
60 y/o M with PMHx T1DM presents to the ED c/o left knee pain/ swelling. pt with cellulitis of the lower extremity. will do bw abx, also esrcrp. If elevated consider ortho consult to eval for septic knee. 58 y/o M with PMHx T1DM presents to the ED c/o left knee pain/ swelling. pt with cellulitis of the lower extremity. will do bw abx, also esr crp. If elevated consider ortho consult to eval for septic knee

## 2022-08-21 NOTE — ED STATDOCS - NS ED ATTENDING STATEMENT MOD
This was a shared visit with the ABILIO. I reviewed and verified the documentation and independently performed the documented:

## 2022-08-21 NOTE — ED ADULT NURSE NOTE - OBJECTIVE STATEMENT
Aox4. Pt complaining of left sided knee pain and swelling x 1 day. Pt reports history of cellulitis and DM. Denies chest pain, SOB, fevers, n/v/d. Respirations even and unlabored. Skin warm to touch.

## 2022-08-21 NOTE — ED STATDOCS - OBJECTIVE STATEMENT
58 y/o M with PMHx T1DM presents to the ED c/o left knee pain/ swelling. Pt reports x 2 days ago he noted swelling to the knee. Pt states pain yesterday was unbearable and states putting pressure on it exacerbates the pain at most. Drinks alcohol socially. On hemagogue. 60 y/o M with PMHx T1DM presents to the ED c/o left knee pain/ swelling. Pt reports x 2 days ago he noted swelling to the knee. Pt states pain yesterday was unbearable and states putting pressure on it exacerbates the pain at most. Drinks alcohol socially. On humalog. Pt denies fevers/chills, ha, loc, focal neuro deficits, cp/sob/palp, cough, abd pain/n/v/d, urinary symptoms, recent travel and sick contacts.

## 2022-08-22 DIAGNOSIS — A41.9 SEPSIS, UNSPECIFIED ORGANISM: ICD-10-CM

## 2022-08-22 DIAGNOSIS — Z02.9 ENCOUNTER FOR ADMINISTRATIVE EXAMINATIONS, UNSPECIFIED: ICD-10-CM

## 2022-08-22 DIAGNOSIS — N17.9 ACUTE KIDNEY FAILURE, UNSPECIFIED: ICD-10-CM

## 2022-08-22 DIAGNOSIS — M00.9 PYOGENIC ARTHRITIS, UNSPECIFIED: ICD-10-CM

## 2022-08-22 DIAGNOSIS — E10.9 TYPE 1 DIABETES MELLITUS WITHOUT COMPLICATIONS: ICD-10-CM

## 2022-08-22 LAB
A1C WITH ESTIMATED AVERAGE GLUCOSE RESULT: 6.4 % — HIGH (ref 4–5.6)
ALBUMIN SERPL ELPH-MCNC: 3.1 G/DL — LOW (ref 3.3–5.2)
ALP SERPL-CCNC: 89 U/L — SIGNIFICANT CHANGE UP (ref 40–120)
ALT FLD-CCNC: 19 U/L — SIGNIFICANT CHANGE UP
ANION GAP SERPL CALC-SCNC: 10 MMOL/L — SIGNIFICANT CHANGE UP (ref 5–17)
ANISOCYTOSIS BLD QL: SLIGHT — SIGNIFICANT CHANGE UP
AST SERPL-CCNC: 21 U/L — SIGNIFICANT CHANGE UP
B PERT IGG+IGM PNL SER: CLEAR — SIGNIFICANT CHANGE UP
BASOPHILS # BLD AUTO: 0 K/UL — SIGNIFICANT CHANGE UP (ref 0–0.2)
BASOPHILS NFR BLD AUTO: 0 % — SIGNIFICANT CHANGE UP (ref 0–2)
BILIRUB SERPL-MCNC: 0.8 MG/DL — SIGNIFICANT CHANGE UP (ref 0.4–2)
BUN SERPL-MCNC: 46.9 MG/DL — HIGH (ref 8–20)
CALCIUM SERPL-MCNC: 9.4 MG/DL — SIGNIFICANT CHANGE UP (ref 8.4–10.5)
CHLORIDE SERPL-SCNC: 95 MMOL/L — LOW (ref 98–107)
CO2 SERPL-SCNC: 23 MMOL/L — SIGNIFICANT CHANGE UP (ref 22–29)
COLOR FLD: YELLOW
CREAT SERPL-MCNC: 2.9 MG/DL — HIGH (ref 0.5–1.3)
EGFR: 25 ML/MIN/1.73M2 — LOW
EOSINOPHIL # BLD AUTO: 0 K/UL — SIGNIFICANT CHANGE UP (ref 0–0.5)
EOSINOPHIL NFR BLD AUTO: 0 % — SIGNIFICANT CHANGE UP (ref 0–6)
ESTIMATED AVERAGE GLUCOSE: 137 MG/DL — HIGH (ref 68–114)
FLUID INTAKE SUBSTANCE CLASS: SIGNIFICANT CHANGE UP
GIANT PLATELETS BLD QL SMEAR: PRESENT — SIGNIFICANT CHANGE UP
GLUCOSE BLDC GLUCOMTR-MCNC: 186 MG/DL — HIGH (ref 70–99)
GLUCOSE BLDC GLUCOMTR-MCNC: 239 MG/DL — HIGH (ref 70–99)
GLUCOSE BLDC GLUCOMTR-MCNC: 309 MG/DL — HIGH (ref 70–99)
GLUCOSE SERPL-MCNC: 324 MG/DL — HIGH (ref 70–99)
HCT VFR BLD CALC: 25.3 % — LOW (ref 39–50)
HGB BLD-MCNC: 8.6 G/DL — LOW (ref 13–17)
LYMPHOCYTES # BLD AUTO: 0.74 K/UL — LOW (ref 1–3.3)
LYMPHOCYTES # BLD AUTO: 4.4 % — LOW (ref 13–44)
LYMPHOCYTES # FLD: 3 % — SIGNIFICANT CHANGE UP
MANUAL SMEAR VERIFICATION: SIGNIFICANT CHANGE UP
MCHC RBC-ENTMCNC: 29.5 PG — SIGNIFICANT CHANGE UP (ref 27–34)
MCHC RBC-ENTMCNC: 34 GM/DL — SIGNIFICANT CHANGE UP (ref 32–36)
MCV RBC AUTO: 86.6 FL — SIGNIFICANT CHANGE UP (ref 80–100)
MICROCYTES BLD QL: SLIGHT — SIGNIFICANT CHANGE UP
MONOCYTES # BLD AUTO: 0.89 K/UL — SIGNIFICANT CHANGE UP (ref 0–0.9)
MONOCYTES NFR BLD AUTO: 5.3 % — SIGNIFICANT CHANGE UP (ref 2–14)
NEUTROPHILS # BLD AUTO: 15.11 K/UL — HIGH (ref 1.8–7.4)
NEUTROPHILS NFR BLD AUTO: 90.3 % — HIGH (ref 43–77)
NEUTROPHILS-BODY FLUID: 97 % — SIGNIFICANT CHANGE UP
PLAT MORPH BLD: NORMAL — SIGNIFICANT CHANGE UP
PLATELET # BLD AUTO: 145 K/UL — LOW (ref 150–400)
POLYCHROMASIA BLD QL SMEAR: SLIGHT — SIGNIFICANT CHANGE UP
POTASSIUM SERPL-MCNC: 5.4 MMOL/L — HIGH (ref 3.5–5.3)
POTASSIUM SERPL-SCNC: 5.4 MMOL/L — HIGH (ref 3.5–5.3)
PROT SERPL-MCNC: 6.1 G/DL — LOW (ref 6.6–8.7)
RBC # BLD: 2.92 M/UL — LOW (ref 4.2–5.8)
RBC # FLD: 12 % — SIGNIFICANT CHANGE UP (ref 10.3–14.5)
RBC BLD AUTO: ABNORMAL
RCV VOL RI: <1000 /UL — HIGH (ref 0–0)
SARS-COV-2 RNA SPEC QL NAA+PROBE: SIGNIFICANT CHANGE UP
SODIUM SERPL-SCNC: 128 MMOL/L — LOW (ref 135–145)
SYNOVIAL CRYSTALS CLARITY: CLEAR — SIGNIFICANT CHANGE UP
SYNOVIAL CRYSTALS COLOR: YELLOW
SYNOVIAL CRYSTALS ID: SIGNIFICANT CHANGE UP
SYNOVIAL CRYSTALS TUBE: SIGNIFICANT CHANGE UP
TOTAL NUCLEATED CELL COUNT, BODY FLUID: 479 /UL — SIGNIFICANT CHANGE UP
TUBE TYPE: SIGNIFICANT CHANGE UP
WBC # BLD: 16.73 K/UL — HIGH (ref 3.8–10.5)
WBC # FLD AUTO: 16.73 K/UL — HIGH (ref 3.8–10.5)

## 2022-08-22 PROCEDURE — 73130 X-RAY EXAM OF HAND: CPT | Mod: 26,50

## 2022-08-22 PROCEDURE — 73562 X-RAY EXAM OF KNEE 3: CPT | Mod: 26,LT

## 2022-08-22 PROCEDURE — 73721 MRI JNT OF LWR EXTRE W/O DYE: CPT | Mod: 26,LT

## 2022-08-22 PROCEDURE — 99223 1ST HOSP IP/OBS HIGH 75: CPT

## 2022-08-22 PROCEDURE — 99222 1ST HOSP IP/OBS MODERATE 55: CPT

## 2022-08-22 PROCEDURE — 71045 X-RAY EXAM CHEST 1 VIEW: CPT | Mod: 26

## 2022-08-22 RX ORDER — DEXTROSE 50 % IN WATER 50 %
25 SYRINGE (ML) INTRAVENOUS ONCE
Refills: 0 | Status: DISCONTINUED | OUTPATIENT
Start: 2022-08-22 | End: 2022-08-30

## 2022-08-22 RX ORDER — VANCOMYCIN HCL 1 G
1250 VIAL (EA) INTRAVENOUS EVERY 12 HOURS
Refills: 0 | Status: DISCONTINUED | OUTPATIENT
Start: 2022-08-22 | End: 2022-08-22

## 2022-08-22 RX ORDER — INSULIN GLARGINE 100 [IU]/ML
28 INJECTION, SOLUTION SUBCUTANEOUS ONCE
Refills: 0 | Status: COMPLETED | OUTPATIENT
Start: 2022-08-22 | End: 2022-08-22

## 2022-08-22 RX ORDER — NALOXONE HYDROCHLORIDE 4 MG/.1ML
0.4 SPRAY NASAL ONCE
Refills: 0 | Status: DISCONTINUED | OUTPATIENT
Start: 2022-08-22 | End: 2022-08-30

## 2022-08-22 RX ORDER — SODIUM CHLORIDE 9 MG/ML
1000 INJECTION INTRAMUSCULAR; INTRAVENOUS; SUBCUTANEOUS
Refills: 0 | Status: DISCONTINUED | OUTPATIENT
Start: 2022-08-22 | End: 2022-08-23

## 2022-08-22 RX ORDER — ACETAMINOPHEN 500 MG
650 TABLET ORAL EVERY 6 HOURS
Refills: 0 | Status: DISCONTINUED | OUTPATIENT
Start: 2022-08-22 | End: 2022-08-30

## 2022-08-22 RX ORDER — VANCOMYCIN HCL 1 G
1000 VIAL (EA) INTRAVENOUS ONCE
Refills: 0 | Status: COMPLETED | OUTPATIENT
Start: 2022-08-22 | End: 2022-08-22

## 2022-08-22 RX ORDER — POLYETHYLENE GLYCOL 3350 17 G/17G
17 POWDER, FOR SOLUTION ORAL DAILY
Refills: 0 | Status: DISCONTINUED | OUTPATIENT
Start: 2022-08-22 | End: 2022-08-30

## 2022-08-22 RX ORDER — ACETAMINOPHEN 500 MG
975 TABLET ORAL ONCE
Refills: 0 | Status: COMPLETED | OUTPATIENT
Start: 2022-08-22 | End: 2022-08-22

## 2022-08-22 RX ORDER — VANCOMYCIN HCL 1 G
1000 VIAL (EA) INTRAVENOUS EVERY 24 HOURS
Refills: 0 | Status: DISCONTINUED | OUTPATIENT
Start: 2022-08-22 | End: 2022-08-24

## 2022-08-22 RX ORDER — INSULIN DEGLUDEC 100 U/ML
28 INJECTION, SOLUTION SUBCUTANEOUS
Qty: 0 | Refills: 0 | DISCHARGE

## 2022-08-22 RX ORDER — DEXTROSE 50 % IN WATER 50 %
12.5 SYRINGE (ML) INTRAVENOUS ONCE
Refills: 0 | Status: DISCONTINUED | OUTPATIENT
Start: 2022-08-22 | End: 2022-08-30

## 2022-08-22 RX ORDER — PIPERACILLIN AND TAZOBACTAM 4; .5 G/20ML; G/20ML
3.38 INJECTION, POWDER, LYOPHILIZED, FOR SOLUTION INTRAVENOUS EVERY 8 HOURS
Refills: 0 | Status: DISCONTINUED | OUTPATIENT
Start: 2022-08-22 | End: 2022-08-24

## 2022-08-22 RX ORDER — SODIUM CHLORIDE 9 MG/ML
1000 INJECTION, SOLUTION INTRAVENOUS
Refills: 0 | Status: DISCONTINUED | OUTPATIENT
Start: 2022-08-22 | End: 2022-08-30

## 2022-08-22 RX ORDER — SENNA PLUS 8.6 MG/1
2 TABLET ORAL AT BEDTIME
Refills: 0 | Status: DISCONTINUED | OUTPATIENT
Start: 2022-08-22 | End: 2022-08-30

## 2022-08-22 RX ORDER — INSULIN GLARGINE 100 [IU]/ML
28 INJECTION, SOLUTION SUBCUTANEOUS EVERY MORNING
Refills: 0 | Status: DISCONTINUED | OUTPATIENT
Start: 2022-08-23 | End: 2022-08-25

## 2022-08-22 RX ORDER — INSULIN LISPRO 100/ML
4 VIAL (ML) SUBCUTANEOUS
Refills: 0 | Status: DISCONTINUED | OUTPATIENT
Start: 2022-08-22 | End: 2022-08-26

## 2022-08-22 RX ORDER — SODIUM CHLORIDE 9 MG/ML
1000 INJECTION INTRAMUSCULAR; INTRAVENOUS; SUBCUTANEOUS ONCE
Refills: 0 | Status: COMPLETED | OUTPATIENT
Start: 2022-08-22 | End: 2022-08-22

## 2022-08-22 RX ORDER — MORPHINE SULFATE 50 MG/1
2 CAPSULE, EXTENDED RELEASE ORAL EVERY 4 HOURS
Refills: 0 | Status: DISCONTINUED | OUTPATIENT
Start: 2022-08-22 | End: 2022-08-25

## 2022-08-22 RX ORDER — INSULIN LISPRO 100/ML
4 VIAL (ML) SUBCUTANEOUS
Qty: 0 | Refills: 0 | DISCHARGE

## 2022-08-22 RX ORDER — GLUCAGON INJECTION, SOLUTION 0.5 MG/.1ML
1 INJECTION, SOLUTION SUBCUTANEOUS ONCE
Refills: 0 | Status: DISCONTINUED | OUTPATIENT
Start: 2022-08-22 | End: 2022-08-30

## 2022-08-22 RX ORDER — INSULIN LISPRO 100/ML
VIAL (ML) SUBCUTANEOUS
Refills: 0 | Status: DISCONTINUED | OUTPATIENT
Start: 2022-08-22 | End: 2022-08-30

## 2022-08-22 RX ORDER — PIPERACILLIN AND TAZOBACTAM 4; .5 G/20ML; G/20ML
3.38 INJECTION, POWDER, LYOPHILIZED, FOR SOLUTION INTRAVENOUS ONCE
Refills: 0 | Status: COMPLETED | OUTPATIENT
Start: 2022-08-22 | End: 2022-08-22

## 2022-08-22 RX ORDER — DEXTROSE 50 % IN WATER 50 %
15 SYRINGE (ML) INTRAVENOUS ONCE
Refills: 0 | Status: DISCONTINUED | OUTPATIENT
Start: 2022-08-22 | End: 2022-08-30

## 2022-08-22 RX ORDER — INSULIN LISPRO 100/ML
VIAL (ML) SUBCUTANEOUS
Refills: 0 | Status: DISCONTINUED | OUTPATIENT
Start: 2022-08-22 | End: 2022-08-22

## 2022-08-22 RX ORDER — HEPARIN SODIUM 5000 [USP'U]/ML
5000 INJECTION INTRAVENOUS; SUBCUTANEOUS EVERY 8 HOURS
Refills: 0 | Status: DISCONTINUED | OUTPATIENT
Start: 2022-08-22 | End: 2022-08-30

## 2022-08-22 RX ADMIN — INSULIN GLARGINE 28 UNIT(S): 100 INJECTION, SOLUTION SUBCUTANEOUS at 13:10

## 2022-08-22 RX ADMIN — PIPERACILLIN AND TAZOBACTAM 25 GRAM(S): 4; .5 INJECTION, POWDER, LYOPHILIZED, FOR SOLUTION INTRAVENOUS at 22:26

## 2022-08-22 RX ADMIN — HEPARIN SODIUM 5000 UNIT(S): 5000 INJECTION INTRAVENOUS; SUBCUTANEOUS at 22:30

## 2022-08-22 RX ADMIN — Medication 650 MILLIGRAM(S): at 22:23

## 2022-08-22 RX ADMIN — HEPARIN SODIUM 5000 UNIT(S): 5000 INJECTION INTRAVENOUS; SUBCUTANEOUS at 14:33

## 2022-08-22 RX ADMIN — Medication 250 MILLIGRAM(S): at 06:38

## 2022-08-22 RX ADMIN — Medication 8: at 13:11

## 2022-08-22 RX ADMIN — Medication 4 UNIT(S): at 17:35

## 2022-08-22 RX ADMIN — PIPERACILLIN AND TAZOBACTAM 200 GRAM(S): 4; .5 INJECTION, POWDER, LYOPHILIZED, FOR SOLUTION INTRAVENOUS at 06:26

## 2022-08-22 RX ADMIN — SODIUM CHLORIDE 1000 MILLILITER(S): 9 INJECTION INTRAMUSCULAR; INTRAVENOUS; SUBCUTANEOUS at 08:00

## 2022-08-22 RX ADMIN — SODIUM CHLORIDE 1000 MILLILITER(S): 9 INJECTION INTRAMUSCULAR; INTRAVENOUS; SUBCUTANEOUS at 01:06

## 2022-08-22 RX ADMIN — Medication 975 MILLIGRAM(S): at 06:50

## 2022-08-22 RX ADMIN — MORPHINE SULFATE 2 MILLIGRAM(S): 50 CAPSULE, EXTENDED RELEASE ORAL at 14:33

## 2022-08-22 RX ADMIN — PIPERACILLIN AND TAZOBACTAM 25 GRAM(S): 4; .5 INJECTION, POWDER, LYOPHILIZED, FOR SOLUTION INTRAVENOUS at 14:34

## 2022-08-22 RX ADMIN — Medication 650 MILLIGRAM(S): at 15:59

## 2022-08-22 RX ADMIN — MORPHINE SULFATE 4 MILLIGRAM(S): 50 CAPSULE, EXTENDED RELEASE ORAL at 00:19

## 2022-08-22 RX ADMIN — Medication 250 MILLIGRAM(S): at 17:35

## 2022-08-22 RX ADMIN — Medication 4 UNIT(S): at 13:11

## 2022-08-22 NOTE — H&P ADULT - HISTORY OF PRESENT ILLNESS
57 yr old M w/a PMH of type 1 DM on [facility] presents with left knee pain.  Patient states that he woke up on Saturday with significant left knee pain, throughout the day the pain got worse with increased swelling and redness.  He reports difficulty ambulating on the knee.  Yesterday he began having fevers and chills.  He reports never experiencing something like this before.  Denies any trauma to the area.

## 2022-08-22 NOTE — CONSULT NOTE ADULT - SUBJECTIVE AND OBJECTIVE BOX
Podiatry HPI: Pt presents to ED for LEft knee pain. Podiatry consulted for Right foot wound. Pt states he follows with podiatrist Dr. Serna and has been applying iodosorb to Right foot wound, denies any drainage to right foot. Denies pain to Right foot. Denies constitutional symptoms No other pedal complaints.     Patient is a 57y old  Male who presents with a chief complaint of Knee pain (23 Aug 2022 10:39)      HPI:  57 yr old M w/a PMH of type 1 DM on [place] presents with left knee pain.  Patient states that he woke up on Saturday with significant left knee pain, throughout the day the pain got worse with increased swelling and redness.  He reports difficulty ambulating on the knee.  Yesterday he began having fevers and chills.  He reports never experiencing something like this before.  Denies any trauma to the area.   (22 Aug 2022 11:19)    PMH:  Allergies: No Known Allergies    Medications: acetaminophen     Tablet .. 650 milliGRAM(s) Oral every 6 hours PRN  bisacodyl 5 milliGRAM(s) Oral daily PRN  dextrose 5%. 1000 milliLiter(s) IV Continuous <Continuous>  dextrose 5%. 1000 milliLiter(s) IV Continuous <Continuous>  dextrose 50% Injectable 25 Gram(s) IV Push once  dextrose 50% Injectable 12.5 Gram(s) IV Push once  dextrose 50% Injectable 25 Gram(s) IV Push once  dextrose Oral Gel 15 Gram(s) Oral once PRN  glucagon  Injectable 1 milliGRAM(s) IntraMuscular once  heparin   Injectable 5000 Unit(s) SubCutaneous every 8 hours  insulin glargine Injectable (LANTUS) 28 Unit(s) SubCutaneous every morning  insulin lispro (ADMELOG) corrective regimen sliding scale   SubCutaneous three times a day before meals  insulin lispro Injectable (ADMELOG) 4 Unit(s) SubCutaneous three times a day before meals  morphine  - Injectable 2 milliGRAM(s) IV Push every 4 hours PRN  naloxone Injectable 0.4 milliGRAM(s) IV Push once  piperacillin/tazobactam IVPB.. 3.375 Gram(s) IV Intermittent every 8 hours  polyethylene glycol 3350 17 Gram(s) Oral daily  senna 2 Tablet(s) Oral at bedtime  sodium chloride 0.9%. 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)      Vital Signs Last 24 Hrs  T(C): 37.8 (23 Aug 2022 08:17), Max: 38.6 (22 Aug 2022 15:48)  T(F): 100.1 (23 Aug 2022 08:17), Max: 101.5 (22 Aug 2022 15:48)  HR: 89 (23 Aug 2022 08:17) (81 - 104)  BP: 145/77 (23 Aug 2022 08:17) (134/72 - 157/84)  BP(mean): --  RR: 16 (23 Aug 2022 08:17) (16 - 20)  SpO2: 96% (23 Aug 2022 08:17) (90% - 97%)    Parameters below as of 23 Aug 2022 08:17  Patient On (Oxygen Delivery Method): room air        LABS                        8.1    13.56 )-----------( 161      ( 23 Aug 2022 02:19 )             24.0               08-23    132<L>  |  98  |  48.2<H>  ----------------------------<  182<H>  4.5   |  22.0  |  3.08<H>    Ca    8.9      23 Aug 2022 02:19    TPro  5.9<L>  /  Alb  2.9<L>  /  TBili  0.8  /  DBili  x   /  AST  28  /  ALT  21  /  AlkPhos  108  08-23     C-Reactive Protein, Serum: 299 mg/L (08-23-22 @ 09:15)  WBC Count: 13.56 K/uL (08-23-22 @ 02:19)  C-Reactive Protein, Serum: 302 mg/L (08-23-22 @ 02:19)  WBC Count: 16.73 K/uL (08-22-22 @ 10:40)  WBC Count: 17.52 K/uL (08-21-22 @ 21:20)  C-Reactive Protein, Serum: 261 mg/L (08-21-22 @ 21:20)  Sedimentation Rate, Erythrocyte: 57 mm/hr (08-21-22 @ 21:20)      ROS  REVIEW OF SYSTEMS:    CONSTITUTIONAL: No fever, weight loss, or fatigue  EYES: No eye pain, visual disturbances, or discharge  ENMT:  No difficulty hearing, tinnitus, vertigo; No sinus or throat pain  NECK: No pain or stiffness  BREASTS: No pain, masses, or nipple discharge  RESPIRATORY: No cough, wheezing, chills or hemoptysis; No shortness of breath  CARDIOVASCULAR: No chest pain, palpitations, dizziness, or leg swelling  GASTROINTESTINAL: No abdominal or epigastric pain. No nausea, vomiting, or hematemesis; No diarrhea or constipation. No melena or hematochezia.  GENITOURINARY: No dysuria, frequency, hematuria, or incontinence  NEUROLOGICAL: No headaches, memory loss, loss of strength, numbness, or tremors  SKIN: No itching, burning, rashes, or lesions   LYMPH NODES: No enlarged glands  ENDOCRINE: No heat or cold intolerance; No hair loss  MUSCULOSKELETAL: No joint pain or swelling; No muscle, back, or extremity pain  PSYCHIATRIC: No depression, anxiety, mood swings, or difficulty sleeping  HEME/LYMPH: No easy bruising, or bleeding gums  ALLERY AND IMMUNOLOGIC: No hives or eczema      PHYSICAL EXAM  GEN: HAILEY CARD is a 57y Male in no acute distress, alert awake, and oriented to person, place and time.   LE Focused:    Vasc:  Pulses faintly palpable DP/PT, skin temp warm to warm prox to distal LE, CFT <3 seconds distal rohit, no edema or erythema noted to LE  Derm: Open wound submet 4 Right foot - negative PTB, no drainage or erythema noted. Wound measures 4cm x 3cmx 0.5cm with granular wound base  Neuro: Protective sensation grossly diminished  MSK: No pain on palpation to noted above wound, muscle strength 5/5 to all extrinsic muscle groups     Imaging: ?xray  Cultures: Culture Results:   No growth (08-22 @ 02:15)  Culture Results:   No growth to date. (08-21 @ 21:30)  Culture Results:   No growth to date. (08-21 @ 21:20)      A:   Right foot diabetic wound    P:  Patient evaluated, chart reviewed  Excisional debridement down to and including subcutaneous tissue to right submet 4 wound using #15 blade  Pt tolerated procedure well  Applied betadine, 4x4 gauze, kerlix R foot  Pt stable from podiatry - rec continuity of care - Rx iodosorb R foot wound per Dr. Serna   WCO upon discharge includes iodosorb right foot wound with 4x4 gauze, kerlix secured with tape, pt to be wb as tolerated in surgical shoe  Discussed and seen bedside with Attending Dr. Sharif

## 2022-08-22 NOTE — H&P ADULT - NSHPLABSRESULTS_GEN_ALL_CORE
LABS:                         8.6    16.73 )-----------( 145      ( 22 Aug 2022 10:40 )             25.3     08-22    128<L>  |  95<L>  |  46.9<H>  ----------------------------<  324<H>  5.4<H>   |  23.0  |  2.90<H>    Ca    9.4      22 Aug 2022 10:40    TPro  6.1<L>  /  Alb  3.1<L>  /  TBili  0.8  /  DBili  x   /  AST  21  /  ALT  19  /  AlkPhos  89  08-22    PT/INR - ( 21 Aug 2022 21:20 )   PT: 13.0 sec;   INR: 1.12 ratio         PTT - ( 21 Aug 2022 21:20 )  PTT:30.3 sec            Records reviewed from prior hospitalization.  Labs reviewed remarkable for   EKG personally reviewed   CXR personally reviewed

## 2022-08-22 NOTE — CONSULT NOTE ADULT - NS ATTEND AMEND GEN_ALL_CORE FT
pt seen and examined. tap not c/w infection. pt /w ulcers at tips of fingers, hx of cellulitis. suspect cellulitis vs autoimmune condition. recommend rheum eval. f/u MRI L knee. f/u Cx. no acute ortho intervention at this time. care as per primary team

## 2022-08-22 NOTE — H&P ADULT - PROBLEM SELECTOR PLAN 3
As per outpatient records baseline creatinine appears to be 1.3   Currently patient has ESTUARDO, likely from sepsis.    Patient urinating now.    Continue IV fluids 150 cc/hr

## 2022-08-22 NOTE — CONSULT NOTE ADULT - SUBJECTIVE AND OBJECTIVE BOX
Pt Name: HAILEY CARD    MRN: [id number]      Patient is a 57y Male presenting to the emergency department with a chief complaint of worsening left knee pain x 3 days with associated swelling and erythema. Pt otherwise denies fever/chills, c/p, sob, abd pain, n/v/c/d, dysuria, numbness/tingling/weakness and has no other complaints at this time.     REVIEW OF SYSTEMS    General: Alert, responsive, in NAD    Skin/Breast: + LLE erythema  	  Ophthalmologic: No visual changes. No redness.   	  ENMT:	No discharge. No swelling.    Respiratory and Thorax: No difficulty breathing. No cough.  	   Cardiovascular:	No chest pain. No palpitations.    Gastrointestinal:	 No abdominal pain. No diarrhea.     Genitourinary: No dysuria. No bleeding.    Musculoskeletal: SEE HPI.    Neurological: No sensory or motor changes.     Psychiatric: No anxiety or depression.    Hematology/Lymphatics: No swelling.    Endocrine: No Hx of diabetes.    ROS is otherwise negative.    PAST MEDICAL & SURGICAL HISTORY:  PAST MEDICAL & SURGICAL HISTORY:      Allergies: No Known Allergies      Medications:     FAMILY HISTORY:  : non-contributory    Social History: denies ETOH abuse    Ambulation: Walking independently [ x ] With Cane [ ] With Walker [ ]  Bedbound [ ]                           10.2   17.52 )-----------( 176      ( 21 Aug 2022 21:20 )             28.9       08-21    127<L>  |  90<L>  |  42.7<H>  ----------------------------<  279<H>  5.0   |  23.0  |  2.97<H>    Ca    10.8<H>      21 Aug 2022 21:20    TPro  7.3  /  Alb  4.0  /  TBili  0.8  /  DBili  x   /  AST  27  /  ALT  24  /  AlkPhos  107  08-21      Vital Signs Last 24 Hrs  T(C): 37.4 (21 Aug 2022 20:51), Max: 37.4 (21 Aug 2022 20:51)  T(F): 99.3 (21 Aug 2022 20:51), Max: 99.3 (21 Aug 2022 20:51)  HR: 95 (21 Aug 2022 20:51) (95 - 95)  BP: 183/86 (21 Aug 2022 20:51) (183/86 - 183/86)  BP(mean): --  RR: 18 (21 Aug 2022 20:51) (18 - 18)  SpO2: 98% (21 Aug 2022 20:51) (98% - 98%)    Parameters below as of 21 Aug 2022 20:51  Patient On (Oxygen Delivery Method): room air        Daily     Daily       PHYSICAL EXAM:      Appearance: Alert, responsive, in no acute distress.    Left lower extremity: Sensation is grossly intact to light touch.  2+ distal pulses. Cap refill < 2 sec. No signs of venous insufficiency or stasis. No extremity ulcerations. No cyanosis. + mild erythema of the medial left knee extending distally to the mid lower leg, +passive/active ROM 0-60 degrees with pain reported by patient, compartments soft and compressible, no open wounds or active bleeding. +plantar/dorsiflexion/ehl/fhl intact.       Imaging Studies: All imaging reviewed by myself and Dr. Parish    Procedure: ARTHROCENTSIS  PROCEDURE NOTE: Arthrocentesis    Performed by: Stefan Bowden PA-C    Indication: Effusion / rule out septic joint    Consent: the risks and benefits of arthrocentesis discussed with patient, including the risk of bleeding, infection, and technical failure. The risks of not performing the procedure, failure to diagnose septic joint with resultant systemic infection, discussed with the patient. The alternatives of performing the procedure also discussed. Written consent was obtained following the discussion.    Universal Protocol: A time out was performed and the correct patient and site were verified.    The left knee joint was prepped and draped in proper sterile fashion. An 18 gauge needle was used to aspirate fluid from the joint using appropriate anatomic landmarks. 25cc fluid was obtained from the joint. The fluid was then walked to the lab for appropriate studies. The site was bandaged and no complications were noted. The patient tolerated the procedure well.    Post-Procedure Diagnosis: Effusion  Complications: None  Specimens Removed: fluid only  Prosthetic devices/implants:  none    Cell Count, Body Fluid (08.22.22 @ 02:15)    Tube Type: Sterile    Fluid Type: Synovial fluid L knee    Body Fluid Appearance: Clear    Color - Body Fluid: Yellow    Total Nucleated Cell Count, Body Fluid: 479: Reference Ranges:  Synovial Fluid  Total nucleated cells < 150 cells/uL  Neutrophils <25%  Lymphocytes 10-15%  Monocytes/Macrophages </=70%  Other parameters- No established reference ranges.  Bronchoalveolar lavage  Macrophages >85%  Lymphocytes 10-15%  Neutrophils <3%  Eosinophils <1%  Other parameters- No established reference ranges.  Peritoneal/pleural/pericardial fluid/other body fluid types  No established reference ranges. /uL    Total RBC Count: <1000 /uL    Neutrophils-Body Fluid: 97 %    BF Lymphocytes: 3 %        A/P:  Pt is a  57y Male with left knee pain x 3 days with associated erythema/swelling/warmth. Elevated WBC count/ESR/CRP while in the ED, otherwise afebrile at time of exam. Cell count 479-- low suspicion for septic arthritis at this time.    PLAN:   * No immediate orthopedic surgical intervention necessary at this time  * Cell count 479, negative crystals- left knee arthrocentesis  * f/u cultures  * follow up with Dr. Parish in the office as needed  * further care as per primary team recommendations  * ortho stable Pt Name: HAILEY CARD    MRN: [id number]      Patient is a 57y Male presenting to the emergency department with a chief complaint of worsening left knee pain x 3 days with associated swelling and erythema. Pt otherwise denies fever/chills, c/p, sob, abd pain, n/v/c/d, dysuria, numbness/tingling/weakness and has no other complaints at this time.     REVIEW OF SYSTEMS    General: Alert, responsive, in NAD    Skin/Breast: + LLE erythema  	  Ophthalmologic: No visual changes. No redness.   	  ENMT:	No discharge. No swelling.    Respiratory and Thorax: No difficulty breathing. No cough.  	   Cardiovascular:	No chest pain. No palpitations.    Gastrointestinal:	 No abdominal pain. No diarrhea.     Genitourinary: No dysuria. No bleeding.    Musculoskeletal: SEE HPI.    Neurological: No sensory or motor changes.     Psychiatric: No anxiety or depression.    Hematology/Lymphatics: No swelling.    Endocrine: No Hx of diabetes.    ROS is otherwise negative.    PAST MEDICAL & SURGICAL HISTORY:  PAST MEDICAL & SURGICAL HISTORY:      Allergies: No Known Allergies      Medications:     FAMILY HISTORY:  : non-contributory    Social History: denies ETOH abuse    Ambulation: Walking independently [ x ] With Cane [ ] With Walker [ ]  Bedbound [ ]                           10.2   17.52 )-----------( 176      ( 21 Aug 2022 21:20 )             28.9       08-21    127<L>  |  90<L>  |  42.7<H>  ----------------------------<  279<H>  5.0   |  23.0  |  2.97<H>    Ca    10.8<H>      21 Aug 2022 21:20    TPro  7.3  /  Alb  4.0  /  TBili  0.8  /  DBili  x   /  AST  27  /  ALT  24  /  AlkPhos  107  08-21      Vital Signs Last 24 Hrs  T(C): 37.4 (21 Aug 2022 20:51), Max: 37.4 (21 Aug 2022 20:51)  T(F): 99.3 (21 Aug 2022 20:51), Max: 99.3 (21 Aug 2022 20:51)  HR: 95 (21 Aug 2022 20:51) (95 - 95)  BP: 183/86 (21 Aug 2022 20:51) (183/86 - 183/86)  BP(mean): --  RR: 18 (21 Aug 2022 20:51) (18 - 18)  SpO2: 98% (21 Aug 2022 20:51) (98% - 98%)    Parameters below as of 21 Aug 2022 20:51  Patient On (Oxygen Delivery Method): room air        Daily     Daily       PHYSICAL EXAM:      Appearance: Alert, responsive, in no acute distress.    Left lower extremity: Sensation is grossly intact to light touch.  2+ distal pulses. Cap refill < 2 sec. No signs of venous insufficiency or stasis. No extremity ulcerations. No cyanosis. + mild erythema of the medial left knee extending distally to the mid lower leg, +passive/active ROM 0-60 degrees with pain reported by patient, compartments soft and compressible, no open wounds or active bleeding. +plantar/dorsiflexion/ehl/fhl intact.       Imaging Studies: All imaging reviewed by myself and Dr. Parish    Procedure: ARTHROCENTSIS  PROCEDURE NOTE: Arthrocentesis    Performed by: Stefan Bowden PA-C    Indication: Effusion / rule out septic joint    Consent: the risks and benefits of arthrocentesis discussed with patient, including the risk of bleeding, infection, and technical failure. The risks of not performing the procedure, failure to diagnose septic joint with resultant systemic infection, discussed with the patient. The alternatives of performing the procedure also discussed. Written consent was obtained following the discussion.    Universal Protocol: A time out was performed and the correct patient and site were verified.    The left knee joint was prepped and draped in proper sterile fashion. An 18 gauge needle was used to aspirate fluid from the joint using appropriate anatomic landmarks. 25cc fluid was obtained from the joint. The fluid was then walked to the lab for appropriate studies. The site was bandaged and no complications were noted. The patient tolerated the procedure well.    Post-Procedure Diagnosis: Effusion  Complications: None  Specimens Removed: fluid only  Prosthetic devices/implants:  none    Cell Count, Body Fluid (08.22.22 @ 02:15)    Tube Type: Sterile    Fluid Type: Synovial fluid L knee    Body Fluid Appearance: Clear    Color - Body Fluid: Yellow    Total Nucleated Cell Count, Body Fluid: 479: Reference Ranges:  Synovial Fluid  Total nucleated cells < 150 cells/uL  Neutrophils <25%  Lymphocytes 10-15%  Monocytes/Macrophages </=70%  Other parameters- No established reference ranges.  Bronchoalveolar lavage  Macrophages >85%  Lymphocytes 10-15%  Neutrophils <3%  Eosinophils <1%  Other parameters- No established reference ranges.  Peritoneal/pleural/pericardial fluid/other body fluid types  No established reference ranges. /uL    Total RBC Count: <1000 /uL    Neutrophils-Body Fluid: 97 %    BF Lymphocytes: 3 %        A/P:  Pt is a  57y Male with left knee pain x 3 days with associated erythema/swelling/warmth. Elevated WBC count/ESR/CRP while in the ED, otherwise afebrile at time of exam. Cell count 479-- low suspicion for septic arthritis at this time.    PLAN:   * Recommend rheum & ID consult  * No immediate orthopedic surgical intervention necessary at this time  * Cell count 479, negative crystals- left knee arthrocentesis  * f/u cultures  * follow up with Dr. Parish in the office as needed  * further care as per primary team recommendations  * ortho stable

## 2022-08-22 NOTE — H&P ADULT - PROBLEM SELECTOR PLAN 2
S/p Tap  Continue broad spectrum abx   Await culture results  MRI of left knee non contrast    Vanco and Zosyn renally dosed.

## 2022-08-22 NOTE — H&P ADULT - PROBLEM SELECTOR PLAN 4
Hgba1c of 6.4   Endocrine consult placed.    28 units of Lantus in AM and 4 units premeal  Moderate sliding scale

## 2022-08-22 NOTE — CONSULT NOTE ADULT - SUBJECTIVE AND OBJECTIVE BOX
HPI:  57 yr old M w/a PMH of type 1 DM  presents with left knee pain.  Patient states that he woke up on Saturday with significant left knee pain, throughout the day the pain got worse with increased swelling and redness.  He reports difficulty ambulating on the knee.  Yesterday he began having fevers and chills.    Denies any trauma to the area.   Pt seen y ortho- had joint aspiration done- no infeciotn   > ? cellulitis     + DR cline injecitons with opthalmology      - developed  blisters on bialteral  feet in 2020 after [place]     left foot healed  right foot still with ulcer- currrently bandaged--seeing podiatry regualraly     also developed blisteres on hands that rupture  -not trauamtic          PAST MEDICAL & SURGICAL HISTORY:    T1DM     last seen in our office 8/21  [facility]   tresiba 28 unit in AM and Hunalog 3-4 tid ac  + site rotation   FAMILY HISTORY  FAther DM         SOCIAL HISTORY:  NO smoking N oa lcohol   REVIEW OF SYSTEMS:    Constitutional: No fever, no chills, no change in weight.    Eyes: No eye swelling,no  blurry vision, no redness, no loss of vision.    Neck: No neck pain, no change in voice.    Lungs: No shortness of breath, no wheezing, no cough    CV: No chest pain, no palpitations, no pain with walking.    GI: No nausea, no vomiting, no constipation, no diarrhea, no abdominal pain    : No urinary frequency, no blood in urine, no urinary burning, no difficulty voiding.    Musculoskeletal: No muscle pain, no joint pain, no swelling.    Skin:right thumb with area of excoiration on tip   pt states was a blister     Neurologic: No headaches, no weakness, no burning or pain in feet, no tremor.    Endocrine: No heat intolerance, no cold intolerance, no increased sweating, no shakiness between meals.    Psych: No depression, no anxiety, no trouble concentrating    MEDICATIONS  (STANDING):  dextrose 5%. 1000 milliLiter(s) (100 mL/Hr) IV Continuous <Continuous>  dextrose 5%. 1000 milliLiter(s) (50 mL/Hr) IV Continuous <Continuous>  dextrose 50% Injectable 25 Gram(s) IV Push once  dextrose 50% Injectable 12.5 Gram(s) IV Push once  dextrose 50% Injectable 25 Gram(s) IV Push once  glucagon  Injectable 1 milliGRAM(s) IntraMuscular once  heparin   Injectable 5000 Unit(s) SubCutaneous every 8 hours  insulin lispro (ADMELOG) corrective regimen sliding scale   SubCutaneous three times a day before meals  insulin lispro Injectable (ADMELOG) 4 Unit(s) SubCutaneous three times a day before meals  naloxone Injectable 0.4 milliGRAM(s) IV Push once  piperacillin/tazobactam IVPB.. 3.375 Gram(s) IV Intermittent every 8 hours  polyethylene glycol 3350 17 Gram(s) Oral daily  senna 2 Tablet(s) Oral at bedtime  sodium chloride 0.9%. 1000 milliLiter(s) (150 mL/Hr) IV Continuous <Continuous>  vancomycin  IVPB 1000 milliGRAM(s) IV Intermittent every 24 hours    MEDICATIONS  (PRN):  acetaminophen     Tablet .. 650 milliGRAM(s) Oral every 6 hours PRN Temp greater or equal to 38C (100.4F), Mild Pain (1 - 3)  bisacodyl 5 milliGRAM(s) Oral daily PRN Constipation  dextrose Oral Gel 15 Gram(s) Oral once PRN Blood Glucose LESS THAN 70 milliGRAM(s)/deciliter  morphine  - Injectable 2 milliGRAM(s) IV Push every 4 hours PRN Moderate Pain (4 - 6)      Allergies    No Known Allergies    Intolerances          CAPILLARY BLOOD GLUCOSE  CAPILLARY BLOOD GLUCOSE      POCT Blood Glucose.: 239 mg/dL (22 Aug 2022 16:58)  POCT Blood Glucose.: 309 mg/dL (22 Aug 2022 13:08)    A1C with Estimated Average Glucose (08.22.22 @ 10:40)   A1C with Estimated Average Glucose Result: 6.4 %   Estimated Average Glucose: 137 mg/dL   POCT Blood Glucose.: 239 mg/dL (22 Aug 2022 16:58)      PHYSICAL EXAM:    Vital Signs Last 24 Hrs  T(C): 38.6 (22 Aug 2022 15:48), Max: 38.6 (22 Aug 2022 15:48)  T(F): 101.5 (22 Aug 2022 15:48), Max: 101.5 (22 Aug 2022 15:48)  HR: 89 (22 Aug 2022 15:48) (85 - 97)  BP: 149/76 (22 Aug 2022 15:48) (136/80 - 183/86)  BP(mean): --  RR: 19 (22 Aug 2022 15:48) (18 - 19)  SpO2: 97% (22 Aug 2022 15:48) (97% - 98%)    Parameters below as of 22 Aug 2022 15:48  Patient On (Oxygen Delivery Method): room air        General appearance: Well developed, well nourished.    Eyes: Pupils equal and reactive to light. EOM full. No exophthalmos.    Neck: Trachea midline. No thyroid enlargement.    Lungs: Normal respiratory excursion. Lungs clear.    CV: Regular cardiac rhythm. No murmur. Carotid and pedal pulses intact.    Abdomen: Soft, non tender, no organomegaly or mass.    Musculoskeletal: No cyanosis, clubbing, or edema. No pedal lesions.    Skin: right thumb wiht area of blister that ruptured    Right foot banadaged  left leg with + warmthe + mild redness     Neuro: Cranial nerves intact. Normal motor and sensory function. DTR's normal.    Psych: Normal affect, good judgement.            LABS:                        8.6    16.73 )-----------( 145      ( 22 Aug 2022 10:40 )             25.3     08-22    128<L>  |  95<L>  |  46.9<H>  ----------------------------<  324<H>  5.4<H>   |  23.0  |  2.90<H>    Ca    9.4      22 Aug 2022 10:40    TPro  6.1<L>  /  Alb  3.1<L>  /  TBili  0.8  /  DBili  x   /  AST  21  /  ALT  19  /  AlkPhos  89  08-22      LIVER FUNCTIONS - ( 22 Aug 2022 10:40 )  Alb: 3.1 g/dL / Pro: 6.1 g/dL / ALK PHOS: 89 U/L / ALT: 19 U/L / AST: 21 U/L / GGT: x                 CAPILLARY BLOOD GLUCOSE      RADIOLOGY & ADDITIONAL STUDIES:

## 2022-08-22 NOTE — H&P ADULT - NSICDXFAMILYHX_GEN_ALL_CORE_FT
FAMILY HISTORY:  Father  Still living? Unknown  No significant family history, Age at diagnosis: Age Unknown

## 2022-08-22 NOTE — H&P ADULT - PROBLEM SELECTOR PLAN 1
Patient meeting sepsis criteria, fever and elevated WBC count.  Chest xray looks clear  UA pending but patient has no urinary complaints  COVID negative.      Likely source is left knee s/p arthrocentesis, cell count however inconsistent.    F/U cultures

## 2022-08-23 DIAGNOSIS — Z90.49 ACQUIRED ABSENCE OF OTHER SPECIFIED PARTS OF DIGESTIVE TRACT: Chronic | ICD-10-CM

## 2022-08-23 LAB
ALBUMIN SERPL ELPH-MCNC: 2.9 G/DL — LOW (ref 3.3–5.2)
ALP SERPL-CCNC: 108 U/L — SIGNIFICANT CHANGE UP (ref 40–120)
ALT FLD-CCNC: 21 U/L — SIGNIFICANT CHANGE UP
ANION GAP SERPL CALC-SCNC: 12 MMOL/L — SIGNIFICANT CHANGE UP (ref 5–17)
AST SERPL-CCNC: 28 U/L — SIGNIFICANT CHANGE UP
BILIRUB SERPL-MCNC: 0.8 MG/DL — SIGNIFICANT CHANGE UP (ref 0.4–2)
BUN SERPL-MCNC: 48.2 MG/DL — HIGH (ref 8–20)
CALCIUM SERPL-MCNC: 8.9 MG/DL — SIGNIFICANT CHANGE UP (ref 8.4–10.5)
CHLORIDE SERPL-SCNC: 98 MMOL/L — SIGNIFICANT CHANGE UP (ref 98–107)
CK MB CFR SERPL CALC: 3.1 NG/ML — SIGNIFICANT CHANGE UP (ref 0–6.7)
CK MB CFR SERPL CALC: 3.1 NG/ML — SIGNIFICANT CHANGE UP (ref 0–6.7)
CK SERPL-CCNC: 563 U/L — HIGH (ref 30–200)
CK SERPL-CCNC: 574 U/L — HIGH (ref 30–200)
CO2 SERPL-SCNC: 22 MMOL/L — SIGNIFICANT CHANGE UP (ref 22–29)
CREAT SERPL-MCNC: 3.08 MG/DL — HIGH (ref 0.5–1.3)
CRP SERPL-MCNC: 299 MG/L — HIGH
CRP SERPL-MCNC: 302 MG/L — HIGH
EGFR: 23 ML/MIN/1.73M2 — LOW
GLUCOSE BLDC GLUCOMTR-MCNC: 114 MG/DL — HIGH (ref 70–99)
GLUCOSE BLDC GLUCOMTR-MCNC: 147 MG/DL — HIGH (ref 70–99)
GLUCOSE BLDC GLUCOMTR-MCNC: 96 MG/DL — SIGNIFICANT CHANGE UP (ref 70–99)
GLUCOSE SERPL-MCNC: 182 MG/DL — HIGH (ref 70–99)
HCT VFR BLD CALC: 24 % — LOW (ref 39–50)
HGB BLD-MCNC: 8.1 G/DL — LOW (ref 13–17)
MCHC RBC-ENTMCNC: 29.8 PG — SIGNIFICANT CHANGE UP (ref 27–34)
MCHC RBC-ENTMCNC: 33.8 GM/DL — SIGNIFICANT CHANGE UP (ref 32–36)
MCV RBC AUTO: 88.2 FL — SIGNIFICANT CHANGE UP (ref 80–100)
PLATELET # BLD AUTO: 161 K/UL — SIGNIFICANT CHANGE UP (ref 150–400)
POTASSIUM SERPL-MCNC: 4.5 MMOL/L — SIGNIFICANT CHANGE UP (ref 3.5–5.3)
POTASSIUM SERPL-SCNC: 4.5 MMOL/L — SIGNIFICANT CHANGE UP (ref 3.5–5.3)
PROCALCITONIN SERPL-MCNC: 2.83 NG/ML — HIGH (ref 0.02–0.1)
PROT SERPL-MCNC: 5.9 G/DL — LOW (ref 6.6–8.7)
RBC # BLD: 2.72 M/UL — LOW (ref 4.2–5.8)
RBC # FLD: 11.9 % — SIGNIFICANT CHANGE UP (ref 10.3–14.5)
SODIUM SERPL-SCNC: 132 MMOL/L — LOW (ref 135–145)
VANCOMYCIN TROUGH SERPL-MCNC: 18.2 UG/ML — SIGNIFICANT CHANGE UP (ref 10–20)
WBC # BLD: 13.56 K/UL — HIGH (ref 3.8–10.5)
WBC # FLD AUTO: 13.56 K/UL — HIGH (ref 3.8–10.5)

## 2022-08-23 PROCEDURE — 99222 1ST HOSP IP/OBS MODERATE 55: CPT

## 2022-08-23 PROCEDURE — 99233 SBSQ HOSP IP/OBS HIGH 50: CPT

## 2022-08-23 PROCEDURE — 93971 EXTREMITY STUDY: CPT | Mod: 26,LT

## 2022-08-23 PROCEDURE — 99223 1ST HOSP IP/OBS HIGH 75: CPT

## 2022-08-23 RX ORDER — LANOLIN ALCOHOL/MO/W.PET/CERES
3 CREAM (GRAM) TOPICAL ONCE
Refills: 0 | Status: COMPLETED | OUTPATIENT
Start: 2022-08-23 | End: 2022-08-23

## 2022-08-23 RX ORDER — SODIUM CHLORIDE 9 MG/ML
1000 INJECTION INTRAMUSCULAR; INTRAVENOUS; SUBCUTANEOUS
Refills: 0 | Status: DISCONTINUED | OUTPATIENT
Start: 2022-08-23 | End: 2022-08-28

## 2022-08-23 RX ORDER — CADEXOMER IODINE 0.9 %
1 PADS, MEDICATED (EA) TOPICAL DAILY
Refills: 0 | Status: DISCONTINUED | OUTPATIENT
Start: 2022-08-23 | End: 2022-08-30

## 2022-08-23 RX ORDER — ACETAMINOPHEN 500 MG
650 TABLET ORAL ONCE
Refills: 0 | Status: COMPLETED | OUTPATIENT
Start: 2022-08-23 | End: 2022-08-23

## 2022-08-23 RX ADMIN — Medication 650 MILLIGRAM(S): at 13:06

## 2022-08-23 RX ADMIN — INSULIN GLARGINE 28 UNIT(S): 100 INJECTION, SOLUTION SUBCUTANEOUS at 08:28

## 2022-08-23 RX ADMIN — SODIUM CHLORIDE 75 MILLILITER(S): 9 INJECTION INTRAMUSCULAR; INTRAVENOUS; SUBCUTANEOUS at 21:18

## 2022-08-23 RX ADMIN — Medication 250 MILLIGRAM(S): at 16:45

## 2022-08-23 RX ADMIN — HEPARIN SODIUM 5000 UNIT(S): 5000 INJECTION INTRAVENOUS; SUBCUTANEOUS at 05:23

## 2022-08-23 RX ADMIN — SODIUM CHLORIDE 150 MILLILITER(S): 9 INJECTION INTRAMUSCULAR; INTRAVENOUS; SUBCUTANEOUS at 04:46

## 2022-08-23 RX ADMIN — SODIUM CHLORIDE 75 MILLILITER(S): 9 INJECTION INTRAMUSCULAR; INTRAVENOUS; SUBCUTANEOUS at 13:36

## 2022-08-23 RX ADMIN — HEPARIN SODIUM 5000 UNIT(S): 5000 INJECTION INTRAVENOUS; SUBCUTANEOUS at 14:46

## 2022-08-23 RX ADMIN — Medication 650 MILLIGRAM(S): at 05:16

## 2022-08-23 RX ADMIN — Medication 650 MILLIGRAM(S): at 07:12

## 2022-08-23 RX ADMIN — HEPARIN SODIUM 5000 UNIT(S): 5000 INJECTION INTRAVENOUS; SUBCUTANEOUS at 21:18

## 2022-08-23 RX ADMIN — PIPERACILLIN AND TAZOBACTAM 25 GRAM(S): 4; .5 INJECTION, POWDER, LYOPHILIZED, FOR SOLUTION INTRAVENOUS at 14:47

## 2022-08-23 RX ADMIN — Medication 650 MILLIGRAM(S): at 04:46

## 2022-08-23 RX ADMIN — Medication 650 MILLIGRAM(S): at 19:30

## 2022-08-23 RX ADMIN — Medication 4 UNIT(S): at 08:28

## 2022-08-23 RX ADMIN — Medication 650 MILLIGRAM(S): at 20:00

## 2022-08-23 RX ADMIN — Medication 3 MILLIGRAM(S): at 21:32

## 2022-08-23 RX ADMIN — Medication 4 UNIT(S): at 16:35

## 2022-08-23 RX ADMIN — SODIUM CHLORIDE 150 MILLILITER(S): 9 INJECTION INTRAMUSCULAR; INTRAVENOUS; SUBCUTANEOUS at 02:41

## 2022-08-23 RX ADMIN — Medication 650 MILLIGRAM(S): at 06:42

## 2022-08-23 RX ADMIN — PIPERACILLIN AND TAZOBACTAM 25 GRAM(S): 4; .5 INJECTION, POWDER, LYOPHILIZED, FOR SOLUTION INTRAVENOUS at 21:19

## 2022-08-23 RX ADMIN — Medication 1 APPLICATION(S): at 14:47

## 2022-08-23 RX ADMIN — SENNA PLUS 2 TABLET(S): 8.6 TABLET ORAL at 21:19

## 2022-08-23 RX ADMIN — PIPERACILLIN AND TAZOBACTAM 25 GRAM(S): 4; .5 INJECTION, POWDER, LYOPHILIZED, FOR SOLUTION INTRAVENOUS at 05:23

## 2022-08-23 RX ADMIN — Medication 650 MILLIGRAM(S): at 14:06

## 2022-08-23 NOTE — PROGRESS NOTE ADULT - ASSESSMENT
57 yr old M w/a PMH of Type 1 DM presents with sepsis likely secondary to septic joint.     #Sepsis.    Patient meeting sepsis criteria, fever and elevated WBC count.  Chest xray lungs are clear  UA pending .  no urinary symptoms   COVID negative.    Likely source is left knee s/p arthrocentesis, cell count however inconsistent.    F/U cultures.  WBC      #? Septic joint.    S/p Tap  Joint fluid , RBC <1000, Neutrophil 97%. Negative for crystal   Continue broad spectrum abx   follow  culture results  MRI of left knee non contrast  on Vanco and Zosyn renally dosed.    #ESTUARDO (acute kidney injury).    baseline creatinine appears to be 1.3   Currently patient has ESTUARDO, likely from sepsis.    Patient urinating now.    Continue IV fluids 150 cc/hr.    # Rhabdomyolysis   elevated CPK     # DM (diabetes mellitus), type 1.    Hgba1c of 6.4   Endocrine consult placed.    28 units of Lantus in AM and 4 units premeal  Moderate sliding scale.    # Hyponatremia \  improving likely 2/2 dehydration            57 yr old M w/a PMH of Type 1 DM presents with sepsis likely secondary to septic joint.     #Sepsis.    Patient meeting sepsis criteria, fever and elevated WBC count.  likely Source is left knee infection/ cellulitis /? Septic joint.  Consulted [facility]    No immediate orthopedic surgical intervention necessary at this time  Chest xray lungs are clear  UA pending .  no urinary symptoms   COVID negative.    Likely source is left knee s/p arthrocentesis, cell count however inconsistent.    F/U cultures.  WBC    S/p Tap  Joint fluid , RBC <1000, Neutrophil 97%. Negative for crystal   Continue broad spectrum abx   follow  culture results  MRI of left knee non contrast as above  on Vanco and Zosyn renally dosed.  Consulting ID   Consulting Rheumatology      #ESTUARDO (acute kidney injury).    baseline creatinine appears to be 1.3   Currently patient has ESTUARDO, likely from sepsis.    Patient urinating now.    Continue IV fluids 150 cc/hr.    #Mild  Rhabdomyolysis   elevated CPK     # DM (diabetes mellitus), type 1.    Hgba1c of 6.4   Endocrine consult placed.    28 units of Lantus in AM and 4 units premeal  Moderate sliding scale.    # Hyponatremia \  improving likely 2/2 dehydration       DVT Prophylaxis c/w Heparin

## 2022-08-23 NOTE — CONSULT NOTE ADULT - SUBJECTIVE AND OBJECTIVE BOX
***Note is Preliminary and Not Complete***                                          [facility] Rheumatology [facility]                                                 Dr. Guero Walters MD                                   [address] [place] [address], [place], [place]  ---------------------------------------------------------------------------------------------------------    HISTORY OF PRESENT ILLNESS:  56 y/o male with T1DM presented to hospital with L knee pain, swelling, erythema.   Pt had L knee arthrocentesis showing , RBC <1000, neutrophil 97%, no crystals.   Pt is being treated with Abx for possible septic joint. Pt was seen by ortho – no acute surgical intervention needed. Pt currently has ESTUARDO.     WORKUP:   WBC 13.56 (neutrophil predominant), Hgb 8.1 (MCV 88.2), Cr 3.08 (GFR 23), , ESR 56,    XR R hand: Normal   MR R knee: Large effusion and soft tissue edema around knee. Prominent muscle edema around semimembranosus and gastrocnemius muscles (reactive, infectious vs. Strain). Tiny likely degenerative bone marrow edema at quadriceps tendon insertion site at the superior pole of the patella.     Obtain uric acid, SYL, DNA antibody, extractable nuclear antigens (FREDA), SSA, SSB, histone Ab, C3, C4, Elly-1, myomarker panel, SCl-70, centromere Ab, RNA polymerase Ab, ANCAs, RF, CCP, HLA-B27, ACE level, aldolase, TSH, RPR, Lyme, Hepatitis B/C panel, Quantiferon TB     PAST MEDICAL & SURGICAL HISTORY:  Type 1 diabetes  S/P appendectomy    REVIEW OF SYSTEMS:  As per HPI    MEDICATIONS  (STANDING):  cadexomer iodine 0.9% Gel 1 Application(s) Topical daily  dextrose 5%. 1000 milliLiter(s) (100 mL/Hr) IV Continuous <Continuous>  dextrose 5%. 1000 milliLiter(s) (50 mL/Hr) IV Continuous <Continuous>  dextrose 50% Injectable 25 Gram(s) IV Push once  dextrose 50% Injectable 12.5 Gram(s) IV Push once  dextrose 50% Injectable 25 Gram(s) IV Push once  glucagon  Injectable 1 milliGRAM(s) IntraMuscular once  heparin   Injectable 5000 Unit(s) SubCutaneous every 8 hours  insulin glargine Injectable (LANTUS) 28 Unit(s) SubCutaneous every morning  insulin lispro (ADMELOG) corrective regimen sliding scale   SubCutaneous three times a day before meals  insulin lispro Injectable (ADMELOG) 4 Unit(s) SubCutaneous three times a day before meals  naloxone Injectable 0.4 milliGRAM(s) IV Push once  piperacillin/tazobactam IVPB.. 3.375 Gram(s) IV Intermittent every 8 hours  polyethylene glycol 3350 17 Gram(s) Oral daily  senna 2 Tablet(s) Oral at bedtime  sodium chloride 0.9%. 1000 milliLiter(s) (75 mL/Hr) IV Continuous <Continuous>  vancomycin  IVPB 1000 milliGRAM(s) IV Intermittent every 24 hours    MEDICATIONS  (PRN):  acetaminophen     Tablet .. 650 milliGRAM(s) Oral every 6 hours PRN Temp greater or equal to 38C (100.4F), Mild Pain (1 - 3)  bisacodyl 5 milliGRAM(s) Oral daily PRN Constipation  dextrose Oral Gel 15 Gram(s) Oral once PRN Blood Glucose LESS THAN 70 milliGRAM(s)/deciliter  morphine  - Injectable 2 milliGRAM(s) IV Push every 4 hours PRN Moderate Pain (4 - 6)    Allergies  No Known Allergies  Intolerances    FAMILY HISTORY:  No significant family history (Father)    SOCIAL HISTORY:     Tobacco:     Alcohol use:     Illicit drug use:     Occupation:    Vital Signs Last 24 Hrs  T(C): 37.8 (23 Aug 2022 08:17), Max: 38.2 (23 Aug 2022 04:37)  T(F): 100.1 (23 Aug 2022 08:17), Max: 100.8 (23 Aug 2022 04:37)  HR: 89 (23 Aug 2022 08:17) (81 - 104)  BP: 145/77 (23 Aug 2022 08:17) (134/72 - 157/84)  BP(mean): --  RR: 16 (23 Aug 2022 08:17) (16 - 20)  SpO2: 96% (23 Aug 2022 08:17) (90% - 96%)    Parameters below as of 23 Aug 2022 08:17  Patient On (Oxygen Delivery Method): room air    Daily     Daily Weight in k.6 (23 Aug 2022 04:37)    PHYSICAL EXAM:  Constitutional: Appears in no acute distress.  HEENT: EOMI, PERRL. No conjunctival erythema. Moist mucous membranes. No nasopharyngeal ulcers.  Neck: Supple, no cervical lymphadenopathy, no thyromegaly.  Cardiovascular: RRR. S1, S2 auscultated. No murmurs or rubs.  Respiratory: Clear to auscultation bilaterally. No wheezes, rales, or crackles.  Gastrointestinal: Soft, nontender, nondistended. Bowel sounds present. No organomegaly.  MSK: No active joint swelling, erythema, or warmth. No joint tenderness to palpation. No joint deformities. Normal ROM of neck, back, b/l upper and lower extremities.  Skin: No rashes or lesions.  Neuro: No focal deficits. CN 2-12 intact. Motor strength 5/5 in major muscle groups of b/l UE and LE. Sensation to soft touch intact in major dermatomes of b/l UE and LE.  Psych: AAOx3. Normal affect and thought process.    LABS:                        8.1    13.56 )-----------( 161      ( 23 Aug 2022 02:19 )             24.0         132<L>  |  98  |  48.2<H>  ----------------------------<  182<H>  4.5   |  22.0  |  3.08<H>    Ca    8.9      23 Aug 2022 02:19    TPro  5.9<L>  /  Alb  2.9<L>  /  TBili  0.8  /  DBili  x   /  AST  28  /  ALT  21  /  AlkPhos  108  -    PT/INR - ( 21 Aug 2022 21:20 )   PT: 13.0 sec;   INR: 1.12 ratio         PTT - ( 21 Aug 2022 21:20 )  PTT:30.3 sec    Creatine Kinase, Serum: 574 U/L *H* [30 - 200] (22 @ 09:15)  C-Reactive Protein, Serum: 299 mg/L *H* (22 @ 09:15)  Creatine Kinase, Serum: 563 U/L *H* [30 - 200] (22 @ 02:19)  C-Reactive Protein, Serum: 302 mg/L *H* (22 @ 02:19)  Total RBC Count: <1000 /uL *H* [0 - 0] (22 @ 02:15)  Body Fluid Appearance: Clear (22 @ 02:15)  Color - Body Fluid: Yellow *!* (22 @ 02:15)  Total Nucleated Cell Count, Body Fluid: 479 /uL (22 @ 02:15)  Culture Results:   No growth (22 @ 02:15)  Culture Results:   No growth to date. (22 @ 21:30)  Culture Results:   No growth to date. (22 @ 21:20)  C-Reactive Protein, Serum: 261 mg/L *H* (22 @ 21:20)  Sedimentation Rate, Erythrocyte: 57 mm/hr *H* [0 - 20] (22 21:20)    RADIOLOGY & ADDITIONAL STUDIES:  < from: MR Knee No Cont, Left (22 @ 23:13) >  IMPRESSION: Evaluation limited by motion artifact.  1.  Large effusion and soft tissue edema about the knee. If there is   concern for septic arthritis/osteomyelitis, aspiration should be   performed.  2.  Prominent muscle edema involving the semimembranosus and   gastrocnemius muscles, may be reactive, infectious, or could represent   muscle strain.  3.  Mild free margin blunting of the medial meniscus posterior horn. No   acute ligamentous injury.  4.  Tiny focus of likely degenerative bone marrow edema at the quadriceps   tendon insertion at the superior pole of the patella. Otherwise, no   fracture or bone marrow edema.  No focal cartilage loss.    ASSESSMENT:    PLANS:                                             [facility]                                                 Dr. Guero Walters MD                                   [address] [place] [address], [place], [place]  ---------------------------------------------------------------------------------------------------------    HISTORY OF PRESENT ILLNESS:  56 y/o male with T1DM presented to hospital with L knee pain, swelling, erythema since 22.  Pt has never had inflammatory arthritis before.  Reports fevers, night sweats.  Pt has history of T1DM with neuropathy of b/l UE and LE confirmed by NCV ~2 years ago. Pt had b/l feet ulcers that are recurrent and never fully heal. Pt has b/l fingertips recurrent ulcers.  Reports chronic cough for which he was evaluated by pulmonary without significant findings. Reports chronic nausea/vomiting with workup by GI in past including EGD not significant.  Reports b/l fingertips turning red in cold temperature.  Reports low back pain and cramps with use.    Denies fatigue, diarrhea, blood in stool, blood in urine, dysuria, sicca symptoms, dysphagia, muscle weakness, calcinosis, rashes, chest pain, abdominal pain.  Denies recent travels, new sexual contact, sick contacts    In the hospital, pt has had low grade fever of 100s. Pt had L knee arthrocentesis showing , RBC <1000, neutrophil 97%, no crystals. The arthrocentesis helped somewhat with pain for about 2 hours.  Pt is being treated with Abx for possible septic joint. Pt was seen by ortho – no acute surgical intervention needed. Pt currently has ESTUARDO.  MR R knee with large effusion with muscle edema of surrounding muscles.    WORKUP:   WBC 13.56 (neutrophil predominant), Hgb 8.1 (MCV 88.2), Cr 3.08 (GFR 23), , ESR 56,    XR R hand: Normal   MR R knee: Large effusion and soft tissue edema around knee. Prominent muscle edema around semimembranosus and gastrocnemius muscles (reactive, infectious vs. Strain). Tiny likely degenerative bone marrow edema at quadriceps tendon insertion site at the superior pole of the patella.     PAST MEDICAL & SURGICAL HISTORY:  Type 1 diabetes  S/P appendectomy    REVIEW OF SYSTEMS:  As per HPI    MEDICATIONS  (STANDING):  cadexomer iodine 0.9% Gel 1 Application(s) Topical daily  dextrose 5%. 1000 milliLiter(s) (100 mL/Hr) IV Continuous <Continuous>  dextrose 5%. 1000 milliLiter(s) (50 mL/Hr) IV Continuous <Continuous>  dextrose 50% Injectable 25 Gram(s) IV Push once  dextrose 50% Injectable 12.5 Gram(s) IV Push once  dextrose 50% Injectable 25 Gram(s) IV Push once  glucagon  Injectable 1 milliGRAM(s) IntraMuscular once  heparin   Injectable 5000 Unit(s) SubCutaneous every 8 hours  insulin glargine Injectable (LANTUS) 28 Unit(s) SubCutaneous every morning  insulin lispro (ADMELOG) corrective regimen sliding scale   SubCutaneous three times a day before meals  insulin lispro Injectable (ADMELOG) 4 Unit(s) SubCutaneous three times a day before meals  naloxone Injectable 0.4 milliGRAM(s) IV Push once  piperacillin/tazobactam IVPB.. 3.375 Gram(s) IV Intermittent every 8 hours  polyethylene glycol 3350 17 Gram(s) Oral daily  senna 2 Tablet(s) Oral at bedtime  sodium chloride 0.9%. 1000 milliLiter(s) (75 mL/Hr) IV Continuous <Continuous>  vancomycin  IVPB 1000 milliGRAM(s) IV Intermittent every 24 hours    MEDICATIONS  (PRN):  acetaminophen     Tablet .. 650 milliGRAM(s) Oral every 6 hours PRN Temp greater or equal to 38C (100.4F), Mild Pain (1 - 3)  bisacodyl 5 milliGRAM(s) Oral daily PRN Constipation  dextrose Oral Gel 15 Gram(s) Oral once PRN Blood Glucose LESS THAN 70 milliGRAM(s)/deciliter  morphine  - Injectable 2 milliGRAM(s) IV Push every 4 hours PRN Moderate Pain (4 - 6)    Allergies  No Known Allergies  Intolerances    FAMILY HISTORY:  No significant family history (Father)    SOCIAL HISTORY:     Tobacco:     Alcohol use:     Illicit drug use:     Occupation:    Vital Signs Last 24 Hrs  T(C): 37.8 (23 Aug 2022 08:17), Max: 38.2 (23 Aug 2022 04:37)  T(F): 100.1 (23 Aug 2022 08:17), Max: 100.8 (23 Aug 2022 04:37)  HR: 89 (23 Aug 2022 08:17) (81 - 104)  BP: 145/77 (23 Aug 2022 08:17) (134/72 - 157/84)  BP(mean): --  RR: 16 (23 Aug 2022 08:17) (16 - 20)  SpO2: 96% (23 Aug 2022 08:17) (90% - 96%)    Parameters below as of 23 Aug 2022 08:17  Patient On (Oxygen Delivery Method): room air    Daily     Daily Weight in k.6 (23 Aug 2022 04:37)    PHYSICAL EXAM:  Constitutional: Appears in no acute distress.  HEENT: EOMI, PERRL. No conjunctival erythema. Moist mucous membranes. No nasopharyngeal ulcers.  Neck: Supple, no cervical lymphadenopathy, no thyromegaly.  Cardiovascular: RRR. S1, S2 auscultated. No murmurs or rubs.  Respiratory: Clear to auscultation bilaterally. No wheezes, rales, or crackles.  Gastrointestinal: Soft, nontender, nondistended. Bowel sounds present. No organomegaly.  MSK: Left knee with dressing. Warmth, swelling, and tenderness to palpation of left knee. No other joint tenderness, swelling, redness, warmth.  No joint deformities. Normal ROM of neck, back, b/l upper and lower extremities.  Skin: R 1st and L 3rd finger tip ulcerations. R foot open ulceration and L foot healed wound. R shin scab.  Neuro: Decreased sensation to soft touch of b/l feet and ankles. Motor strength 5/5 in major muscle groups of b/l UE and LE.  Psych: AAOx3. Normal affect and thought process.    LABS:                        8.1    13.56 )-----------( 161      ( 23 Aug 2022 02:19 )             24.0     08-23    132<L>  |  98  |  48.2<H>  ----------------------------<  182<H>  4.5   |  22.0  |  3.08<H>    Ca    8.9      23 Aug 2022 02:19    TPro  5.9<L>  /  Alb  2.9<L>  /  TBili  0.8  /  DBili  x   /  AST  28  /  ALT  21  /  AlkPhos  108      PT/INR - ( 21 Aug 2022 21:20 )   PT: 13.0 sec;   INR: 1.12 ratio         PTT - ( 21 Aug 2022 21:20 )  PTT:30.3 sec    Creatine Kinase, Serum: 574 U/L *H* [30 - 200] (22 @ 09:15)  C-Reactive Protein, Serum: 299 mg/L *H* (22 @ 09:15)  Creatine Kinase, Serum: 563 U/L *H* [30 - 200] (22 @ 02:19)  C-Reactive Protein, Serum: 302 mg/L *H* (22 @ 02:19)  Total RBC Count: <1000 /uL *H* [0 - 0] (22 @ 02:15)  Body Fluid Appearance: Clear (22 @ 02:15)  Color - Body Fluid: Yellow *!* (22 @ 02:15)  Total Nucleated Cell Count, Body Fluid: 479 /uL (22 @ 02:15)  Culture Results:   No growth (22 @ 02:15)  Culture Results:   No growth to date. (22 @ 21:30)  Culture Results:   No growth to date. (22 @ 21:20)  C-Reactive Protein, Serum: 261 mg/L *H* (22 @ 21:20)  Sedimentation Rate, Erythrocyte: 57 mm/hr *H* [0 - 20] (22 @ 21:20)    RADIOLOGY & ADDITIONAL STUDIES:  < from: MR Knee No Cont, Left (22 @ 23:13) >  IMPRESSION: Evaluation limited by motion artifact.  1.  Large effusion and soft tissue edema about the knee. If there is   concern for septic arthritis/osteomyelitis, aspiration should be   performed.  2.  Prominent muscle edema involving the semimembranosus and   gastrocnemius muscles, may be reactive, infectious, or could represent   muscle strain.  3.  Mild free margin blunting of the medial meniscus posterior horn. No   acute ligamentous injury.  4.  Tiny focus of likely degenerative bone marrow edema at the quadriceps   tendon insertion at the superior pole of the patella. Otherwise, no   fracture or bone marrow edema.  No focal cartilage loss.    ASSESSMENT:  56 y/o male with T1DM with associated neuropathy of b/l hands and feet presents with first episode of monoarticular inflammatory arthritis of left knee.  Pt has current low grade fever, nigh sweats. Chronic symptoms of recurrent ulcers of hands, feet, Raynaud's like symptoms of hands, cough, nausea/vomiting, low back pain. No recent travels, new sexual contact, sick contacts  Labwork with elevated WBC, low Hgb, ESTUARDO, high ESR, CRP, mildly elevated CPK. L knee synovial fluid with mild-mod elevated WBC count, negative crystals. MR R knee showing large knee effusion and muscle edema in the surrounding muscles.    DDx for inflammatory monoarthritis include septic arthritis, crystalline arthritis, autoimmune inflammatory arthritis (although they tend to be more symmetrical and recurrent).  Pt does not have specific signs of any specific autoimmune rheum diseases. Pt has not had similar attacks in the past and synovial fluid without crystals. Although MR shows muscle edema with elevated CPK, it is consistent with reactive muscle edema due to knee synovitis rather an underlying myositis. Pt does not have muscle weakness, rashes, dysphagia, calcinosis that can be associated with autoimmune myositis. Pt has symptoms that can be consistent with Raynaud's with fingertip ulcers. However, pt has known diagnosis of neuropathy confirmed by NCV in past, and it is more likely to be explained by diabetic neuropathy.    Overall, my impression is that pt's L knee inflammatory arthritis is less likely to be autoimmune or crystalline. However, pt should undergo workup to evaluate for autoimmune diseases including myositis. Pt should also be thoroughly evaluated and treated for infections in setting of fevers, in setting of pt's long standing diabetes which can predispose patient to unusual infections which may be more difficult to diagnose and culture.    PLANS:  - Obtain uric acid, SYL, DNA antibody, extractable nuclear antigens (FREDA), SSA, SSB, histone Ab, C3, C4, Elly-1, myomarker panel, SCl-70, centromere Ab, RNA polymerase Ab, ANCAs, RF, CCP, HLA-B27, ACE level, aldolase, TSH, RPR, Lyme, Gonorrhea, Chlamydia, Hepatitis B/C panel, Quantiferon TB   - I would appreciate ID's assistance in evaluating for any atypical infections including fungal infections, atypical mycobacterium, etc. in labwork as well as in cultures  - Will follow as workup continues

## 2022-08-23 NOTE — PROGRESS NOTE ADULT - SUBJECTIVE AND OBJECTIVE BOX
CC: Follow up T1DM management     INTERVAL HPI/OVERNIGHT EVENTS:  Complains of left knee pain. Febrile     ROS: Patient denies chest pain, SOB, abd pain    MEDICATIONS  (STANDING):  dextrose 5%. 1000 milliLiter(s) (100 mL/Hr) IV Continuous <Continuous>  dextrose 5%. 1000 milliLiter(s) (50 mL/Hr) IV Continuous <Continuous>  dextrose 50% Injectable 25 Gram(s) IV Push once  dextrose 50% Injectable 12.5 Gram(s) IV Push once  dextrose 50% Injectable 25 Gram(s) IV Push once  glucagon  Injectable 1 milliGRAM(s) IntraMuscular once  heparin   Injectable 5000 Unit(s) SubCutaneous every 8 hours  insulin glargine Injectable (LANTUS) 28 Unit(s) SubCutaneous every morning  insulin lispro (ADMELOG) corrective regimen sliding scale   SubCutaneous three times a day before meals  insulin lispro Injectable (ADMELOG) 4 Unit(s) SubCutaneous three times a day before meals  naloxone Injectable 0.4 milliGRAM(s) IV Push once  piperacillin/tazobactam IVPB.. 3.375 Gram(s) IV Intermittent every 8 hours  polyethylene glycol 3350 17 Gram(s) Oral daily  senna 2 Tablet(s) Oral at bedtime  sodium chloride 0.9%. 1000 milliLiter(s) (150 mL/Hr) IV Continuous <Continuous>  vancomycin  IVPB 1000 milliGRAM(s) IV Intermittent every 24 hours    MEDICATIONS  (PRN):  acetaminophen     Tablet .. 650 milliGRAM(s) Oral every 6 hours PRN Temp greater or equal to 38C (100.4F), Mild Pain (1 - 3)  bisacodyl 5 milliGRAM(s) Oral daily PRN Constipation  dextrose Oral Gel 15 Gram(s) Oral once PRN Blood Glucose LESS THAN 70 milliGRAM(s)/deciliter  morphine  - Injectable 2 milliGRAM(s) IV Push every 4 hours PRN Moderate Pain (4 - 6)    Allergies  No Known Allergies    Vital Signs Last 24 Hrs  T(C): 37.8 (23 Aug 2022 08:17), Max: 38.6 (22 Aug 2022 15:48)  T(F): 100.1 (23 Aug 2022 08:17), Max: 101.5 (22 Aug 2022 15:48)  HR: 89 (23 Aug 2022 08:17) (81 - 104)  BP: 145/77 (23 Aug 2022 08:17) (134/72 - 157/84)  BP(mean): --  RR: 16 (23 Aug 2022 08:17) (16 - 20)  SpO2: 96% (23 Aug 2022 08:17) (90% - 97%)    Parameters below as of 23 Aug 2022 08:17  Patient On (Oxygen Delivery Method): room air      PHYSICAL EXAM:  General: No apparent distress  Neck: Supple, trachea midline, no thyromegaly  Respiratory: Lungs clear bilaterally, normal rate, effort  Cardiac: +S1, S2, no m/r/g  GI: +BS, soft, non tender, non distended  Extremities: Left leg/knee edematous, mild redness, warm to touch  Neuro: A+O X3, no tremor  Skin: Fingertips with blisters    LABS:                        8.1    13.56 )-----------( 161      ( 23 Aug 2022 02:19 )             24.0     08-23    132<L>  |  98  |  48.2<H>  ----------------------------<  182<H>  4.5   |  22.0  |  3.08<H>    Ca    8.9      23 Aug 2022 02:19    TPro  5.9<L>  /  Alb  2.9<L>  /  TBili  0.8  /  DBili  x   /  AST  28  /  ALT  21  /  AlkPhos  108  08-23        POCT Blood Glucose.: 147 mg/dL (08-23-22 @ 08:06)  POCT Blood Glucose.: 186 mg/dL (08-22-22 @ 22:32)  POCT Blood Glucose.: 239 mg/dL (08-22-22 @ 16:58)  POCT Blood Glucose.: 309 mg/dL (08-22-22 @ 13:08)

## 2022-08-23 NOTE — PROGRESS NOTE ADULT - SUBJECTIVE AND OBJECTIVE BOX
[facility] HPI: Pt seen bedside in no acute distress. PT s/p Left knee arthrocentesis noninfectious 8/22. Denies any overnight acute events. Denies pain to right foot, denies constitutional symptoms. No other pedal complaints.      Podiatry HPI: Pt presents to ED for LEft knee pain. Podiatry consulted for Right foot wound. Pt states he follows with podiatrist Dr. Serna and has been applying iodosorb to Right foot wound, denies any drainage to right foot. Denies pain to Right foot. Denies constitutional symptoms No other pedal complaints.     Patient is a 57y old  Male who presents with a chief complaint of Knee pain (23 Aug 2022 10:39)      HPI:  57 yr old M w/a PMH of type 1 [facility] with left knee pain.  Patient states that he woke up on Saturday with significant left knee pain, throughout the day the pain got worse with increased swelling and redness.  He reports difficulty ambulating on the knee.  Yesterday he began having fevers and chills.  He reports never experiencing something like this before.  Denies any trauma to the area.   (22 Aug 2022 11:19)    PMH:  Allergies: No Known Allergies      Medications acetaminophen     Tablet .. 650 milliGRAM(s) Oral every 6 hours PRN  bisacodyl 5 milliGRAM(s) Oral daily PRN  dextrose 5%. 1000 milliLiter(s) IV Continuous <Continuous>  dextrose 5%. 1000 milliLiter(s) IV Continuous <Continuous>  dextrose 50% Injectable 25 Gram(s) IV Push once  dextrose 50% Injectable 12.5 Gram(s) IV Push once  dextrose 50% Injectable 25 Gram(s) IV Push once  dextrose Oral Gel 15 Gram(s) Oral once PRN  glucagon  Injectable 1 milliGRAM(s) IntraMuscular once  heparin   Injectable 5000 Unit(s) SubCutaneous every 8 hours  insulin glargine Injectable (LANTUS) 28 Unit(s) SubCutaneous every morning  insulin lispro (ADMELOG) corrective regimen sliding scale   SubCutaneous three times a day before meals  insulin lispro Injectable (ADMELOG) 4 Unit(s) SubCutaneous three times a day before meals  morphine  - Injectable 2 milliGRAM(s) IV Push every 4 hours PRN  naloxone Injectable 0.4 milliGRAM(s) IV Push once  piperacillin/tazobactam IVPB.. 3.375 Gram(s) IV Intermittent every 8 hours  polyethylene glycol 3350 17 Gram(s) Oral daily  senna 2 Tablet(s) Oral at bedtime  sodium chloride 0.9%. 1000 milliLiter(s) IV Continuous <Continuous>  vancomycin  IVPB 1000 milliGRAM(s) IV Intermittent every 24 hours    FHNo significant family history (Father)    ,   PMH   PSH    Labs                          8.1    13.56 )-----------( 161      ( 23 Aug 2022 02:19 )             24.0      08-23    132<L>  |  98  |  48.2<H>  ----------------------------<  182<H>  4.5   |  22.0  |  3.08<H>    Ca    8.9      23 Aug 2022 02:19    TPro  5.9<L>  /  Alb  2.9<L>  /  TBili  0.8  /  DBili  x   /  AST  28  /  ALT  21  /  AlkPhos  108  08-23     Vital Signs Last 24 Hrs  T(C): 37.8 (23 Aug 2022 08:17), Max: 38.6 (22 Aug 2022 15:48)  T(F): 100.1 (23 Aug 2022 08:17), Max: 101.5 (22 Aug 2022 15:48)  HR: 89 (23 Aug 2022 08:17) (81 - 104)  BP: 145/77 (23 Aug 2022 08:17) (134/72 - 157/84)  BP(mean): --  RR: 16 (23 Aug 2022 08:17) (16 - 20)  SpO2: 96% (23 Aug 2022 08:17) (90% - 97%)    Parameters below as of 23 Aug 2022 08:17  Patient On (Oxygen Delivery Method): room air      Sedimentation Rate, Erythrocyte: 57 mm/hr (08-21-22 @ 21:20)         C-Reactive Protein, Serum: 299 mg/L (08-23-22 @ 09:15)  C-Reactive Protein, Serum: 302 mg/L (08-23-22 @ 02:19)  C-Reactive Protein, Serum: 261 mg/L (08-21-22 @ 21:20)   WBC Count: 13.56 K/uL *H* (08-23-22 @ 02:19)        PHYSICAL EXAM  GEN: HAILEY CARD is a 57y Male in no acute distress, alert awake, and oriented to person, place and time.   LE Focused:    Vasc:  Pulses faintly palpable DP/PT, skin temp warm to warm prox to distal LE, CFT <3 seconds distal rohit, no edema or erythema noted to LE  Derm: Open wound submet 4 Right foot - negative PTB, no drainage or erythema noted. Wound measures 4cm x 3cmx 0.5cm with granular wound base  Neuro: Protective sensation grossly diminished  MSK: No pain on palpation to noted above wound, muscle strength 5/5 to all extrinsic muscle groups     Imaging: ?xray  Cultures: Culture Results:   No growth (08-22 @ 02:15)  Culture Results:   No growth to date. (08-21 @ 21:30)  Culture Results:   No growth to date. (08-21 @ 21:20)      A:   Right foot diabetic wound    P:  Patient evaluated, chart reviewed  Applied betadine, 4x4 gauze, kerlix R foot  Pt stable from podiatry - rec continuity of care - Rx iodosorb R foot wound per Dr. Serna   WCO upon discharge includes iodosorb right foot wound with 4x4 gauze, kerlix secured with tape, pt to be wb as tolerated in surgical shoe  Discussed and seen bedside with Attending Dr. Sharif

## 2022-08-23 NOTE — PROGRESS NOTE ADULT - SUBJECTIVE AND OBJECTIVE BOX
[facility] Division of Hospital Medicine    Chief Complaint:      SUBJECTIVE / OVERNIGHT EVENTS:  Patient seen and examined at bedside       PHYSICAL EXAM:  Vital Signs Last 24 Hrs  T(C): 37.8 (23 Aug 2022 08:17), Max: 38.6 (22 Aug 2022 15:48)  T(F): 100.1 (23 Aug 2022 08:17), Max: 101.5 (22 Aug 2022 15:48)  HR: 89 (23 Aug 2022 08:17) (81 - 104)  BP: 145/77 (23 Aug 2022 08:17) (134/72 - 157/84)  BP(mean): --  RR: 16 (23 Aug 2022 08:17) (16 - 20)  SpO2: 96% (23 Aug 2022 08:17) (90% - 97%)    Parameters below as of 23 Aug 2022 08:17  Patient On (Oxygen Delivery Method): room air  CONSTITUTIONAL: NAD, well-developed, well-groomed  ENMT: Moist oral mucosa, no pharyngeal injection or exudates; normal dentition  RESPIRATORY: Normal respiratory effort; lungs are clear to auscultation bilaterally  CARDIOVASCULAR: Regular rate and rhythm, normal S1 and S2, no murmur/rub/gallop; No lower extremity edema; Peripheral pulses are 2+ bilaterally  ABDOMEN: Nontender to palpation, normoactive bowel sounds, no rebound/guarding; No hepatosplenomegaly  MUSCLOSKELETAL:  Normal gait; no clubbing or cyanosis of digits; no joint swelling or tenderness to palpation  PSYCH: A+O to person, place, and time; affect appropriate  NEUROLOGY: CN 2-12 are intact and symmetric; no gross sensory deficits;   SKIN: No rashes; no palpable lesions    LABS:                        8.1    13.56 )-----------( 161      ( 23 Aug 2022 02:19 )             24.0     08-23    132<L>  |  98  |  48.2<H>  ----------------------------<  182<H>  4.5   |  22.0  |  3.08<H>    Ca    8.9      23 Aug 2022 02:19    TPro  5.9<L>  /  Alb  2.9<L>  /  TBili  0.8  /  DBili  x   /  AST  28  /  ALT  21  /  AlkPhos  108  08-23    PT/INR - ( 21 Aug 2022 21:20 )   PT: 13.0 sec;   INR: 1.12 ratio         PTT - ( 21 Aug 2022 21:20 )  PTT:30.3 sec  CARDIAC MARKERS ( 23 Aug 2022 09:15 )  x     / x     / 574 U/L / x     / 3.1 ng/mL  CARDIAC MARKERS ( 23 Aug 2022 02:19 )  x     / x     / 563 U/L / x     / 3.1 ng/mL          Culture - Joint Fluid (collected 22 Aug 2022 02:15)  Source: Knee Left Knee Synovial Fluid  Gram Stain (22 Aug 2022 11:47):    Moderate polymorphonuclear leukocytes per low power field    No organisms seen per oil power field  Preliminary Report (23 Aug 2022 09:27):    No growth    Culture - Blood (collected 21 Aug 2022 21:30)  Source: .Blood Blood-Peripheral  Preliminary Report (23 Aug 2022 07:01):    No growth to date.    Culture - Blood (collected 21 Aug 2022 21:20)  Source: .Blood Blood-Peripheral  Preliminary Report (23 Aug 2022 07:01):    No growth to date.      CAPILLARY BLOOD GLUCOSE      POCT Blood Glucose.: 96 mg/dL (23 Aug 2022 11:50)  POCT Blood Glucose.: 147 mg/dL (23 Aug 2022 08:06)  POCT Blood Glucose.: 186 mg/dL (22 Aug 2022 22:32)  POCT Blood Glucose.: 239 mg/dL (22 Aug 2022 16:58)  POCT Blood Glucose.: 309 mg/dL (22 Aug 2022 13:08)        RADIOLOGY & ADDITIONAL TESTS:  Results Reviewed:   Imaging Personally Reviewed:  Electrocardiogram Personally Reviewed:    MEDICATIONS  (STANDING):  cadexomer iodine 0.9% Gel 1 Application(s) Topical daily  dextrose 5%. 1000 milliLiter(s) (100 mL/Hr) IV Continuous <Continuous>  dextrose 5%. 1000 milliLiter(s) (50 mL/Hr) IV Continuous <Continuous>  dextrose 50% Injectable 25 Gram(s) IV Push once  dextrose 50% Injectable 12.5 Gram(s) IV Push once  dextrose 50% Injectable 25 Gram(s) IV Push once  glucagon  Injectable 1 milliGRAM(s) IntraMuscular once  heparin   Injectable 5000 Unit(s) SubCutaneous every 8 hours  insulin glargine Injectable (LANTUS) 28 Unit(s) SubCutaneous every morning  insulin lispro (ADMELOG) corrective regimen sliding scale   SubCutaneous three times a day before meals  insulin lispro Injectable (ADMELOG) 4 Unit(s) SubCutaneous three times a day before meals  naloxone Injectable 0.4 milliGRAM(s) IV Push once  piperacillin/tazobactam IVPB.. 3.375 Gram(s) IV Intermittent every 8 hours  polyethylene glycol 3350 17 Gram(s) Oral daily  senna 2 Tablet(s) Oral at bedtime  sodium chloride 0.9%. 1000 milliLiter(s) (150 mL/Hr) IV Continuous <Continuous>  vancomycin  IVPB 1000 milliGRAM(s) IV Intermittent every 24 hours    MEDICATIONS  (PRN):  acetaminophen     Tablet .. 650 milliGRAM(s) Oral every 6 hours PRN Temp greater or equal to 38C (100.4F), Mild Pain (1 - 3)  bisacodyl 5 milliGRAM(s) Oral daily PRN Constipation  dextrose Oral Gel 15 Gram(s) Oral once PRN Blood Glucose LESS THAN 70 milliGRAM(s)/deciliter  morphine  - Injectable 2 milliGRAM(s) IV Push every 4 hours PRN Moderate Pain (4 - 6)        I&O's Summary    MRI left knee                                             [facility] Division of Hospital Medicine    Chief Complaint:      SUBJECTIVE / OVERNIGHT EVENTS:  Patient seen and examined at bedside   febrile yesterday   Patient c/o chills  c/o lower back pain   left kne pain is controlled     PHYSICAL EXAM:  Vital Signs Last 24 Hrs  T(C): 37.8 (23 Aug 2022 08:17), Max: 38.6 (22 Aug 2022 15:48)  T(F): 100.1 (23 Aug 2022 08:17), Max: 101.5 (22 Aug 2022 15:48)  HR: 89 (23 Aug 2022 08:17) (81 - 104)  BP: 145/77 (23 Aug 2022 08:17) (134/72 - 157/84)  BP(mean): --  RR: 16 (23 Aug 2022 08:17) (16 - 20)  SpO2: 96% (23 Aug 2022 08:17) (90% - 97%)    Parameters below as of 23 Aug 2022 08:17  Patient On (Oxygen Delivery Method): room air  CONSTITUTIONAL: NAD, well-developed, well-groomed  ENMT: Moist oral mucosa, no pharyngeal injection or exudates; normal dentition  RESPIRATORY: Normal respiratory effort; lungs are clear to auscultation bilaterally  CARDIOVASCULAR: Regular rate and rhythm, normal S1 and S2, no murmur/rub/gallop; No lower extremity edema; Peripheral pulses are 2+ bilaterally  ABDOMEN: Nontender to palpation, normoactive bowel sounds, no rebound/guarding; No hepatosplenomegaly  MUSCLOSKELETAL:  Normal gait; no clubbing or cyanosis of digits; no joint swelling or tenderness to palpation  PSYCH: A+O to person, place, and time; affect appropriate  NEUROLOGY: CN 2-12 are intact and symmetric; no gross sensory deficits;   SKIN: No rashes; no palpable lesions    LABS:                        8.1    13.56 )-----------( 161      ( 23 Aug 2022 02:19 )             24.0     08-23    132<L>  |  98  |  48.2<H>  ----------------------------<  182<H>  4.5   |  22.0  |  3.08<H>    Ca    8.9      23 Aug 2022 02:19    TPro  5.9<L>  /  Alb  2.9<L>  /  TBili  0.8  /  DBili  x   /  AST  28  /  ALT  21  /  AlkPhos  108  08-23    PT/INR - ( 21 Aug 2022 21:20 )   PT: 13.0 sec;   INR: 1.12 ratio         PTT - ( 21 Aug 2022 21:20 )  PTT:30.3 sec  CARDIAC MARKERS ( 23 Aug 2022 09:15 )  x     / x     / 574 U/L / x     / 3.1 ng/mL  CARDIAC MARKERS ( 23 Aug 2022 02:19 )  x     / x     / 563 U/L / x     / 3.1 ng/mL          Culture - Joint Fluid (collected 22 Aug 2022 02:15)  Source: Knee Left Knee Synovial Fluid  Gram Stain (22 Aug 2022 11:47):    Moderate polymorphonuclear leukocytes per low power field    No organisms seen per oil power field  Preliminary Report (23 Aug 2022 09:27):    No growth    Culture - Blood (collected 21 Aug 2022 21:30)  Source: .Blood Blood-Peripheral  Preliminary Report (23 Aug 2022 07:01):    No growth to date.    Culture - Blood (collected 21 Aug 2022 21:20)  Source: .Blood Blood-Peripheral  Preliminary Report (23 Aug 2022 07:01):    No growth to date.      CAPILLARY BLOOD GLUCOSE      POCT Blood Glucose.: 96 mg/dL (23 Aug 2022 11:50)  POCT Blood Glucose.: 147 mg/dL (23 Aug 2022 08:06)  POCT Blood Glucose.: 186 mg/dL (22 Aug 2022 22:32)  POCT Blood Glucose.: 239 mg/dL (22 Aug 2022 16:58)  POCT Blood Glucose.: 309 mg/dL (22 Aug 2022 13:08)        RADIOLOGY & ADDITIONAL TESTS:  Results Reviewed:   Imaging Personally Reviewed:  Electrocardiogram Personally Reviewed:    MEDICATIONS  (STANDING):  cadexomer iodine 0.9% Gel 1 Application(s) Topical daily  dextrose 5%. 1000 milliLiter(s) (100 mL/Hr) IV Continuous <Continuous>  dextrose 5%. 1000 milliLiter(s) (50 mL/Hr) IV Continuous <Continuous>  dextrose 50% Injectable 25 Gram(s) IV Push once  dextrose 50% Injectable 12.5 Gram(s) IV Push once  dextrose 50% Injectable 25 Gram(s) IV Push once  glucagon  Injectable 1 milliGRAM(s) IntraMuscular once  heparin   Injectable 5000 Unit(s) SubCutaneous every 8 hours  insulin glargine Injectable (LANTUS) 28 Unit(s) SubCutaneous every morning  insulin lispro (ADMELOG) corrective regimen sliding scale   SubCutaneous three times a day before meals  insulin lispro Injectable (ADMELOG) 4 Unit(s) SubCutaneous three times a day before meals  naloxone Injectable 0.4 milliGRAM(s) IV Push once  piperacillin/tazobactam IVPB.. 3.375 Gram(s) IV Intermittent every 8 hours  polyethylene glycol 3350 17 Gram(s) Oral daily  senna 2 Tablet(s) Oral at bedtime  sodium chloride 0.9%. 1000 milliLiter(s) (150 mL/Hr) IV Continuous <Continuous>  vancomycin  IVPB 1000 milliGRAM(s) IV Intermittent every 24 hours    MEDICATIONS  (PRN):  acetaminophen     Tablet .. 650 milliGRAM(s) Oral every 6 hours PRN Temp greater or equal to 38C (100.4F), Mild Pain (1 - 3)  bisacodyl 5 milliGRAM(s) Oral daily PRN Constipation  dextrose Oral Gel 15 Gram(s) Oral once PRN Blood Glucose LESS THAN 70 milliGRAM(s)/deciliter  morphine  - Injectable 2 milliGRAM(s) IV Push every 4 hours PRN Moderate Pain (4 - 6)        I&O's Summary    MRI left knee                                             [facility] Division of Hospital Medicine    Chief Complaint:      SUBJECTIVE / OVERNIGHT EVENTS:  Patient seen and examined at bedside   febrile yesterday   Patient c/o chills  c/o lower back pain   left kne pain is controlled     PHYSICAL EXAM:  Vital Signs Last 24 Hrs  T(C): 37.8 (23 Aug 2022 08:17), Max: 38.6 (22 Aug 2022 15:48)  T(F): 100.1 (23 Aug 2022 08:17), Max: 101.5 (22 Aug 2022 15:48)  HR: 89 (23 Aug 2022 08:17) (81 - 104)  BP: 145/77 (23 Aug 2022 08:17) (134/72 - 157/84)  BP(mean): --  RR: 16 (23 Aug 2022 08:17) (16 - 20)  SpO2: 96% (23 Aug 2022 08:17) (90% - 97%)    Parameters below as of 23 Aug 2022 08:17  Patient On (Oxygen Delivery Method): room air  CONSTITUTIONAL: NAD, well-developed, well-groomed  ENMT: Moist oral mucosa, no pharyngeal injection or exudates; normal dentition  RESPIRATORY: Normal respiratory effort; lungs are clear to auscultation bilaterally  CARDIOVASCULAR: Regular rate and rhythm, normal S1 and S2, no murmur/rub/gallop; No lower extremity edema; Peripheral pulses are 2+ bilaterally  ABDOMEN: Nontender to palpation, normoactive bowel sounds, no rebound/guarding; No hepatosplenomegaly  MUSCLOSKELETAL:  Normal gait; no clubbing or cyanosis of digits; no joint swelling or tenderness to palpation  Left knee  medial aspect erythema, redness and swelling  PSYCH: A+O to person, place, and time; affect appropriate  NEUROLOGY: CN 2-12 are intact and symmetric; no gross sensory deficits;   SKIN: No rashes; no palpable lesions    LABS:                        8.1    13.56 )-----------( 161      ( 23 Aug 2022 02:19 )             24.0     08-23    132<L>  |  98  |  48.2<H>  ----------------------------<  182<H>  4.5   |  22.0  |  3.08<H>    Ca    8.9      23 Aug 2022 02:19    TPro  5.9<L>  /  Alb  2.9<L>  /  TBili  0.8  /  DBili  x   /  AST  28  /  ALT  21  /  AlkPhos  108  08-23    PT/INR - ( 21 Aug 2022 21:20 )   PT: 13.0 sec;   INR: 1.12 ratio         PTT - ( 21 Aug 2022 21:20 )  PTT:30.3 sec  CARDIAC MARKERS ( 23 Aug 2022 09:15 )  x     / x     / 574 U/L / x     / 3.1 ng/mL  CARDIAC MARKERS ( 23 Aug 2022 02:19 )  x     / x     / 563 U/L / x     / 3.1 ng/mL          Culture - Joint Fluid (collected 22 Aug 2022 02:15)  Source: Knee Left Knee Synovial Fluid  Gram Stain (22 Aug 2022 11:47):    Moderate polymorphonuclear leukocytes per low power field    No organisms seen per oil power field  Preliminary Report (23 Aug 2022 09:27):    No growth    Culture - Blood (collected 21 Aug 2022 21:30)  Source: .Blood Blood-Peripheral  Preliminary Report (23 Aug 2022 07:01):    No growth to date.    Culture - Blood (collected 21 Aug 2022 21:20)  Source: .Blood Blood-Peripheral  Preliminary Report (23 Aug 2022 07:01):    No growth to date.      CAPILLARY BLOOD GLUCOSE      POCT Blood Glucose.: 96 mg/dL (23 Aug 2022 11:50)  POCT Blood Glucose.: 147 mg/dL (23 Aug 2022 08:06)  POCT Blood Glucose.: 186 mg/dL (22 Aug 2022 22:32)  POCT Blood Glucose.: 239 mg/dL (22 Aug 2022 16:58)  POCT Blood Glucose.: 309 mg/dL (22 Aug 2022 13:08)        RADIOLOGY & ADDITIONAL TESTS:  Results Reviewed:   Imaging Personally Reviewed:  Electrocardiogram Personally Reviewed:    MEDICATIONS  (STANDING):  cadexomer iodine 0.9% Gel 1 Application(s) Topical daily  dextrose 5%. 1000 milliLiter(s) (100 mL/Hr) IV Continuous <Continuous>  dextrose 5%. 1000 milliLiter(s) (50 mL/Hr) IV Continuous <Continuous>  dextrose 50% Injectable 25 Gram(s) IV Push once  dextrose 50% Injectable 12.5 Gram(s) IV Push once  dextrose 50% Injectable 25 Gram(s) IV Push once  glucagon  Injectable 1 milliGRAM(s) IntraMuscular once  heparin   Injectable 5000 Unit(s) SubCutaneous every 8 hours  insulin glargine Injectable (LANTUS) 28 Unit(s) SubCutaneous every morning  insulin lispro (ADMELOG) corrective regimen sliding scale   SubCutaneous three times a day before meals  insulin lispro Injectable (ADMELOG) 4 Unit(s) SubCutaneous three times a day before meals  naloxone Injectable 0.4 milliGRAM(s) IV Push once  piperacillin/tazobactam IVPB.. 3.375 Gram(s) IV Intermittent every 8 hours  polyethylene glycol 3350 17 Gram(s) Oral daily  senna 2 Tablet(s) Oral at bedtime  sodium chloride 0.9%. 1000 milliLiter(s) (150 mL/Hr) IV Continuous <Continuous>  vancomycin  IVPB 1000 milliGRAM(s) IV Intermittent every 24 hours    MEDICATIONS  (PRN):  acetaminophen     Tablet .. 650 milliGRAM(s) Oral every 6 hours PRN Temp greater or equal to 38C (100.4F), Mild Pain (1 - 3)  bisacodyl 5 milliGRAM(s) Oral daily PRN Constipation  dextrose Oral Gel 15 Gram(s) Oral once PRN Blood Glucose LESS THAN 70 milliGRAM(s)/deciliter  morphine  - Injectable 2 milliGRAM(s) IV Push every 4 hours PRN Moderate Pain (4 - 6)        I&O's Summary    MRI left knee

## 2022-08-23 NOTE — CONSULT NOTE ADULT - SUBJECTIVE AND OBJECTIVE BOX
[facility]                                                INFECTIOUS DISEASES  =======================================================                               Rodger Marino MD#  Hugo Collado MD*                                     Ivon Romano MD*    Faviola Lopez MD*            Diplomates American Board of Internal Medicine & Infectious Diseases                  # [place] Office - Appt - Tel  [phone] Fax [phone]                * [place] Office - Appt - Tel [phone] Fax [phone]                                  Hospital Consult line:  [phone]  =======================================================      [id number]  HAILEY CARD   HPI:  57 yr old M w/a PMH of type 1 DM on [facility] presents with left knee pain.  Patient states that he woke up on Saturday with significant left knee pain, throughout the day the pain got worse with increased swelling and redness.  He reports difficulty ambulating on the knee.  Yesterday he began having fevers and chills.  He reports never experiencing something like this before.  Denies any trauma to the area.   (22 Aug 2022 11:19)    no recent travel or illness  does not recall tick/bug bites  recal      I have personally reviewed the labs and data; pertinent labs and data are listed in this note; please see below.   =======================================================  Past Medical & Surgical Hx:  =====================  PAST MEDICAL & SURGICAL HISTORY:  Type 1 diabetes      S/P appendectomy        Problem List:  ==========  HEALTH ISSUES - PROBLEM Dx:  Sepsis    Septic joint    ESTUARDO (acute kidney injury)    DM (diabetes mellitus), type 1    Discharge planning issues          Social Hx:  =======  no toxic habits currently    FAMILY HISTORY:  No significant family history (Father)    no significant family history of immunosuppressive disorders in mother or father   =======================================================    REVIEW OF SYSTEMS:  CONSTITUTIONAL:  +fever  HEENT:  No diplopia or blurred vision.  No earache, sore throat or runny nose.  CARDIOVASCULAR:  No pressure, squeezing, strangling, tightness, heaviness or aching about the chest, neck, axilla or epigastrium.  RESPIRATORY:  No cough, shortness of breath  GASTROINTESTINAL:  No nausea, vomiting or diarrhea.  GENITOURINARY:  No dysuria, frequency or urgency. No Blood in urine  MUSCULOSKELETAL:  +knee pain and swelling  SKIN:  No change in skin, hair or nails.  NEUROLOGIC:  No Headaches, seizures or weakness.  PSYCHIATRIC:  No disorder of thought or mood.  ENDOCRINE:  No heat or cold intolerance  HEMATOLOGICAL:  No easy bruising or bleeding.    =======================================================  Allergies    No Known Allergies    Intolerances    Antibiotics:  piperacillin/tazobactam IVPB.. 3.375 Gram(s) IV Intermittent every 8 hours  vancomycin  IVPB 1000 milliGRAM(s) IV Intermittent every 24 hours    Other medications:  cadexomer iodine 0.9% Gel 1 Application(s) Topical daily  dextrose 5%. 1000 milliLiter(s) IV Continuous <Continuous>  dextrose 5%. 1000 milliLiter(s) IV Continuous <Continuous>  dextrose 50% Injectable 25 Gram(s) IV Push once  dextrose 50% Injectable 12.5 Gram(s) IV Push once  dextrose 50% Injectable 25 Gram(s) IV Push once  glucagon  Injectable 1 milliGRAM(s) IntraMuscular once  heparin   Injectable 5000 Unit(s) SubCutaneous every 8 hours  insulin glargine Injectable (LANTUS) 28 Unit(s) SubCutaneous every morning  insulin lispro (ADMELOG) corrective regimen sliding scale   SubCutaneous three times a day before meals  insulin lispro Injectable (ADMELOG) 4 Unit(s) SubCutaneous three times a day before meals  naloxone Injectable 0.4 milliGRAM(s) IV Push once  polyethylene glycol 3350 17 Gram(s) Oral daily  senna 2 Tablet(s) Oral at bedtime  sodium chloride 0.9%. 1000 milliLiter(s) IV Continuous <Continuous>   piperacillin/tazobactam IVPB..   25 mL/Hr IV Intermittent (08-22-22 @ 14:34)   25 mL/Hr IV Intermittent (08-22-22 @ 22:26)   25 mL/Hr IV Intermittent (08-23-22 @ 05:23)   25 mL/Hr IV Intermittent (08-23-22 @ 14:47)   25 mL/Hr IV Intermittent (08-23-22 @ 21:19)   25 mL/Hr IV Intermittent (08-24-22 @ 06:08)    piperacillin/tazobactam IVPB...   200 mL/Hr IV Intermittent (08-22-22 @ 06:26)    vancomycin  IVPB   250 mL/Hr IV Intermittent (08-22-22 @ 17:35)   250 mL/Hr IV Intermittent (08-23-22 @ 16:45)    vancomycin  IVPB.   250 mL/Hr IV Intermittent (08-22-22 @ 06:38)      ======================================================  Physical Exam:  ============  T(F): 100.5 (24 Aug 2022 06:30), Max: 100.8 (24 Aug 2022 06:00)  HR: 97 (24 Aug 2022 06:30)  BP: 146/84 (24 Aug 2022 06:30)  RR: 18 (24 Aug 2022 06:30)  SpO2: 94% (24 Aug 2022 06:30) (94% - 95%)  temp max in last 48H T(F): , Max: 101.5 (08-22-22 @ 15:48)    General:  No acute distress.  Eye: Pupils are equal, round and reactive to light, Normal conjunctiva.  HENT: Normocephalic, Oral mucosa is moist, No pharyngeal erythema, No sinus tenderness.  Neck: Supple, No lymphadenopathy.  Respiratory: Lungs are clear to auscultation, Respirations are non-labored.  Cardiovascular: Normal rate, Regular rhythm, s1 + s2  Gastrointestinal: Soft, Non-tender, Non-distended, Normal bowel sounds.  Genitourinary: No costovertebral angle tenderness.  Lymphatics: No lymphadenopathy neck,   Musculoskeletal: left knee +swelling and tenderness, there is swelling and erythema upto mid medial thigh extending to his calf +pitting edema  Integumentary: No rash.  Neurologic: Alert, Oriented, No focal deficits  Psychiatric: Appropriate mood & affect.    =======================================================  Labs:                        8.5    12.84 )-----------( 201      ( 24 Aug 2022 06:15 )             25.1     08-24    136  |  101  |  41.9<H>  ----------------------------<  108<H>  4.4   |  19.0<L>  |  2.81<H>    Ca    8.7      24 Aug 2022 06:15    TPro  6.1<L>  /  Alb  2.6<L>  /  TBili  0.7  /  DBili  x   /  AST  50<H>  /  ALT  39  /  AlkPhos  171<H>  08-24      Culture - Joint Fluid (collected 08-22-22 @ 02:15)  Source: Knee Left Knee Synovial Fluid  Gram Stain (08-22-22 @ 11:47):    Moderate polymorphonuclear leukocytes per low power field    No organisms seen per oil power field    Culture - Blood (collected 08-21-22 @ 21:30)  Source: .Blood Blood-Peripheral    Culture - Blood (collected 08-21-22 @ 21:20)  Source: .Blood Blood-Peripheral       COVID-19 PCR: NotDetec (08-22-22 @ 08:10)       < from: MR Knee No Cont, Left (08.22.22 @ 23:13) >  IMPRESSION: Evaluation limited by motion artifact.    1.  Large effusion and soft tissue edema about the knee. If there is   concern for septic arthritis/osteomyelitis, aspiration should be   performed.    2.  Prominent muscle edema involving the semimembranosus and   gastrocnemius muscles, may be reactive, infectious, or could represent   muscle strain.    3.  Mild free margin blunting of the medial meniscus posterior horn. No   acute ligamentous injury.    4.  Tiny focus of likely degenerative bone marrow edema at the quadriceps   tendon insertion at the superior pole of the patella. Otherwise, no   fracture or bone marrow edema.  No focal cartilage loss.    --- End of Report ---        < end of copied text >    < from: US Duplex Venous Lower Ext Ltd, Left (08.23.22 @ 18:35) >  FINDINGS:    There is normal compressibility of the left common femoral, femoral and   popliteal veins.  The contralateral common femoral vein is patent.  Doppler examination shows normal spontaneous and phasic flow.    No calf vein thrombosis is detected.    Incidental note is made of effusion in the left knee.    IMPRESSION:  No evidence of left lower extremity deep venous thrombosis.  Left knee effusion is seen.    --- End of Report ---    < end of copied text >    < from: Xray Hand 3 Views, Bilateral (08.22.22 @ 12:52) >  IMPRESSION: There is a mildly effusion. No acute fracture.    There is a curious tubular calcific density seen anteromedially around   the left knee. Exact significance uncertain.    --- End of Report ---      < end of copied text >                                              [facility]                                                INFECTIOUS DISEASES  =======================================================                               Rodger Marino MD#  Hugo Collado MD*                                     Ivon Romano MD*    Faviola Lopez MD*            Diplomates American Board of Internal Medicine & Infectious Diseases                  # [place] Office - Appt - Tel  [phone] Fax [phone]                * [place] Office - Appt - Tel [phone] Fax [phone]                                  Hospital Consult line:  [phone]  =======================================================      [id number]  HAILEY CARD   HPI:  57 yr old M w/a PMH of type 1 DM on [facility] presents with left knee pain.  Patient states that he woke up on Saturday with significant left knee pain, throughout the day the pain got worse with increased swelling and redness.  He reports difficulty ambulating on the knee.  Yesterday he began having fevers and chills.  He reports never experiencing something like this before.  Denies any trauma to the area.   (22 Aug 2022 11:19)    no recent travel or illness  does not recall tick/bug bites  recal      I have personally reviewed the labs and data; pertinent labs and data are listed in this note; please see below.   =======================================================  Past Medical & Surgical Hx:  =====================  PAST MEDICAL & SURGICAL HISTORY:  Type 1 diabetes      S/P appendectomy        Problem List:  ==========  HEALTH ISSUES - PROBLEM Dx:  Sepsis    Septic joint    ESTUARDO (acute kidney injury)    DM (diabetes mellitus), type 1    Discharge planning issues          Social Hx:  =======  no toxic habits currently    FAMILY HISTORY:  No significant family history (Father)    no significant family history of immunosuppressive disorders in mother or father   =======================================================    REVIEW OF SYSTEMS:  CONSTITUTIONAL:  +fever  HEENT:  No diplopia or blurred vision.  No earache, sore throat or runny nose.  CARDIOVASCULAR:  No pressure, squeezing, strangling, tightness, heaviness or aching about the chest, neck, axilla or epigastrium.  RESPIRATORY:  No cough, shortness of breath  GASTROINTESTINAL:  No nausea, vomiting or diarrhea.  GENITOURINARY:  No dysuria, frequency or urgency. No Blood in urine  MUSCULOSKELETAL:  +knee pain and swelling  SKIN:  No change in skin, hair or nails.  NEUROLOGIC:  No Headaches, seizures or weakness.  PSYCHIATRIC:  No disorder of thought or mood.  ENDOCRINE:  No heat or cold intolerance  HEMATOLOGICAL:  No easy bruising or bleeding.    =======================================================  Allergies    No Known Allergies    Intolerances    Antibiotics:  piperacillin/tazobactam IVPB.. 3.375 Gram(s) IV Intermittent every 8 hours  vancomycin  IVPB 1000 milliGRAM(s) IV Intermittent every 24 hours    Other medications:  cadexomer iodine 0.9% Gel 1 Application(s) Topical daily  dextrose 5%. 1000 milliLiter(s) IV Continuous <Continuous>  dextrose 5%. 1000 milliLiter(s) IV Continuous <Continuous>  dextrose 50% Injectable 25 Gram(s) IV Push once  dextrose 50% Injectable 12.5 Gram(s) IV Push once  dextrose 50% Injectable 25 Gram(s) IV Push once  glucagon  Injectable 1 milliGRAM(s) IntraMuscular once  heparin   Injectable 5000 Unit(s) SubCutaneous every 8 hours  insulin glargine Injectable (LANTUS) 28 Unit(s) SubCutaneous every morning  insulin lispro (ADMELOG) corrective regimen sliding scale   SubCutaneous three times a day before meals  insulin lispro Injectable (ADMELOG) 4 Unit(s) SubCutaneous three times a day before meals  naloxone Injectable 0.4 milliGRAM(s) IV Push once  polyethylene glycol 3350 17 Gram(s) Oral daily  senna 2 Tablet(s) Oral at bedtime  sodium chloride 0.9%. 1000 milliLiter(s) IV Continuous <Continuous>   piperacillin/tazobactam IVPB..   25 mL/Hr IV Intermittent (08-22-22 @ 14:34)   25 mL/Hr IV Intermittent (08-22-22 @ 22:26)   25 mL/Hr IV Intermittent (08-23-22 @ 05:23)   25 mL/Hr IV Intermittent (08-23-22 @ 14:47)   25 mL/Hr IV Intermittent (08-23-22 @ 21:19)   25 mL/Hr IV Intermittent (08-24-22 @ 06:08)    piperacillin/tazobactam IVPB...   200 mL/Hr IV Intermittent (08-22-22 @ 06:26)    vancomycin  IVPB   250 mL/Hr IV Intermittent (08-22-22 @ 17:35)   250 mL/Hr IV Intermittent (08-23-22 @ 16:45)    vancomycin  IVPB.   250 mL/Hr IV Intermittent (08-22-22 @ 06:38)      ======================================================  Physical Exam:  ============  T(F): 100.5 (24 Aug 2022 06:30), Max: 100.8 (24 Aug 2022 06:00)  HR: 97 (24 Aug 2022 06:30)  BP: 146/84 (24 Aug 2022 06:30)  RR: 18 (24 Aug 2022 06:30)  SpO2: 94% (24 Aug 2022 06:30) (94% - 95%)  temp max in last 48H T(F): , Max: 101.5 (08-22-22 @ 15:48)    General:  No acute distress.  Eye: Pupils are equal, round and reactive to light, Normal conjunctiva.  HENT: Normocephalic, Oral mucosa is moist, No pharyngeal erythema, No sinus tenderness.  Neck: Supple, No lymphadenopathy.  Respiratory: Lungs are clear to auscultation, Respirations are non-labored.  Cardiovascular: Normal rate, Regular rhythm, s1 + s2  Gastrointestinal: Soft, Non-tender, Non-distended, Normal bowel sounds.  Genitourinary: No costovertebral angle tenderness.  Lymphatics: No lymphadenopathy neck,   Musculoskeletal: left knee +swelling and tenderness, there is swelling and erythema upto mid medial thigh extending to his calf +pitting edema  Integumentary: No rash.  Neurologic: Alert, Oriented, No focal deficits  Psychiatric: Appropriate mood & affect.    =======================================================  Labs:                        8.5    12.84 )-----------( 201      ( 24 Aug 2022 06:15 )             25.1     08-24    136  |  101  |  41.9<H>  ----------------------------<  108<H>  4.4   |  19.0<L>  |  2.81<H>    Ca    8.7      24 Aug 2022 06:15    TPro  6.1<L>  /  Alb  2.6<L>  /  TBili  0.7  /  DBili  x   /  AST  50<H>  /  ALT  39  /  AlkPhos  171<H>  08-24      Culture - Joint Fluid (collected 08-22-22 @ 02:15)  Source: Knee Left Knee Synovial Fluid  Gram Stain (08-22-22 @ 11:47):    Moderate polymorphonuclear leukocytes per low power field    No organisms seen per oil power field    Culture - Blood (collected 08-21-22 @ 21:30)  Source: .Blood Blood-Peripheral    Culture - Blood (collected 08-21-22 @ 21:20)  Source: .Blood Blood-Peripheral       COVID-19 PCR: NotDetec (08-22-22 @ 08:10)       < from: MR Knee No Cont, Left (08.22.22 @ 23:13) >  IMPRESSION: Evaluation limited by motion artifact.    1.  Large effusion and soft tissue edema about the knee. If there is   concern for septic arthritis/osteomyelitis, aspiration should be   performed.    2.  Prominent muscle edema involving the semimembranosus and   gastrocnemius muscles, may be reactive, infectious, or could represent   muscle strain.    3.  Mild free margin blunting of the medial meniscus posterior horn. No   acute ligamentous injury.    4.  Tiny focus of likely degenerative bone marrow edema at the quadriceps   tendon insertion at the superior pole of the patella. Otherwise, no   fracture or bone marrow edema.  No focal cartilage loss.    --- End of Report ---        < end of copied text >    < from: US Duplex Venous Lower Ext Ltd, Left (08.23.22 @ 18:35) >  FINDINGS:    There is normal compressibility of the left common femoral, femoral and   popliteal veins.  The contralateral common femoral vein is patent.  Doppler examination shows normal spontaneous and phasic flow.    No calf vein thrombosis is detected.    Incidental note is made of effusion in the left knee.    IMPRESSION:  No evidence of left lower extremity deep venous thrombosis.  Left knee effusion is seen.    --- End of Report ---    < end of copied text >    < from: Xray Hand 3 Views, Bilateral (08.22.22 @ 12:52) >  IMPRESSION: There is a mildly effusion. No acute fracture.    There is a curious tubular calcific density seen anteromedially around   the left knee. Exact significance uncertain.    --- End of Report ---      < end of copied text >                                              [facility]                                                INFECTIOUS DISEASES  =======================================================                               Rodger Marino MD#  Hugo Collado MD*                                     Ivon Romano MD*    Faviola Lopez MD*            Diplomates American Board of Internal Medicine & Infectious Diseases                  # [place] Office - Appt - Tel  [phone] Fax [phone]                * [place] Office - Appt - Tel [phone] Fax [phone]                                  Hospital Consult line:  [phone]  =======================================================      [id number]  HAILEY CARD   HPI:  57 yr old M w/a PMH of type 1 DM on [facility] presents with left knee pain.  Patient states that he woke up on Saturday with significant left knee pain, throughout the day the pain got worse with increased swelling and redness.  He reports difficulty ambulating on the knee.  Yesterday he began having fevers and chills.  He reports never experiencing something like this before.  Denies any trauma to the area.   (22 Aug 2022 11:19)    no recent travel or illness  does not recall tick/bug bites  recal      I have personally reviewed the labs and data; pertinent labs and data are listed in this note; please see below.   =======================================================  Past Medical & Surgical Hx:  =====================  PAST MEDICAL & SURGICAL HISTORY:  Type 1 diabetes      S/P appendectomy        Problem List:  ==========  HEALTH ISSUES - PROBLEM Dx:  Sepsis    Septic joint    ESTUARDO (acute kidney injury)    DM (diabetes mellitus), type 1    Discharge planning issues          Social Hx:  =======  no toxic habits currently    FAMILY HISTORY:  No significant family history (Father)    no significant family history of immunosuppressive disorders in mother or father   =======================================================    REVIEW OF SYSTEMS:  CONSTITUTIONAL:  +fever  HEENT:  No diplopia or blurred vision.  No earache, sore throat or runny nose.  CARDIOVASCULAR:  No pressure, squeezing, strangling, tightness, heaviness or aching about the chest, neck, axilla or epigastrium.  RESPIRATORY:  No cough, shortness of breath  GASTROINTESTINAL:  No nausea, vomiting or diarrhea.  GENITOURINARY:  No dysuria, frequency or urgency. No Blood in urine  MUSCULOSKELETAL:  +knee pain and swelling  SKIN:  No change in skin, hair or nails.  NEUROLOGIC:  No Headaches, seizures or weakness.  PSYCHIATRIC:  No disorder of thought or mood.  ENDOCRINE:  No heat or cold intolerance  HEMATOLOGICAL:  No easy bruising or bleeding.    =======================================================  Allergies    No Known Allergies    Intolerances    Antibiotics:  piperacillin/tazobactam IVPB.. 3.375 Gram(s) IV Intermittent every 8 hours  vancomycin  IVPB 1000 milliGRAM(s) IV Intermittent every 24 hours    Other medications:  cadexomer iodine 0.9% Gel 1 Application(s) Topical daily  dextrose 5%. 1000 milliLiter(s) IV Continuous <Continuous>  dextrose 5%. 1000 milliLiter(s) IV Continuous <Continuous>  dextrose 50% Injectable 25 Gram(s) IV Push once  dextrose 50% Injectable 12.5 Gram(s) IV Push once  dextrose 50% Injectable 25 Gram(s) IV Push once  glucagon  Injectable 1 milliGRAM(s) IntraMuscular once  heparin   Injectable 5000 Unit(s) SubCutaneous every 8 hours  insulin glargine Injectable (LANTUS) 28 Unit(s) SubCutaneous every morning  insulin lispro (ADMELOG) corrective regimen sliding scale   SubCutaneous three times a day before meals  insulin lispro Injectable (ADMELOG) 4 Unit(s) SubCutaneous three times a day before meals  naloxone Injectable 0.4 milliGRAM(s) IV Push once  polyethylene glycol 3350 17 Gram(s) Oral daily  senna 2 Tablet(s) Oral at bedtime  sodium chloride 0.9%. 1000 milliLiter(s) IV Continuous <Continuous>   piperacillin/tazobactam IVPB..   25 mL/Hr IV Intermittent (08-22-22 @ 14:34)   25 mL/Hr IV Intermittent (08-22-22 @ 22:26)   25 mL/Hr IV Intermittent (08-23-22 @ 05:23)   25 mL/Hr IV Intermittent (08-23-22 @ 14:47)   25 mL/Hr IV Intermittent (08-23-22 @ 21:19)   25 mL/Hr IV Intermittent (08-24-22 @ 06:08)    piperacillin/tazobactam IVPB...   200 mL/Hr IV Intermittent (08-22-22 @ 06:26)    vancomycin  IVPB   250 mL/Hr IV Intermittent (08-22-22 @ 17:35)   250 mL/Hr IV Intermittent (08-23-22 @ 16:45)    vancomycin  IVPB.   250 mL/Hr IV Intermittent (08-22-22 @ 06:38)      ======================================================  Physical Exam:  ============  T(F): 100.5 (24 Aug 2022 06:30), Max: 100.8 (24 Aug 2022 06:00)  HR: 97 (24 Aug 2022 06:30)  BP: 146/84 (24 Aug 2022 06:30)  RR: 18 (24 Aug 2022 06:30)  SpO2: 94% (24 Aug 2022 06:30) (94% - 95%)  temp max in last 48H T(F): , Max: 101.5 (08-22-22 @ 15:48)    General:  No acute distress.  Eye: Pupils are equal, round and reactive to light, Normal conjunctiva.  HENT: Normocephalic, Oral mucosa is moist, No pharyngeal erythema, No sinus tenderness.  Neck: Supple, No lymphadenopathy.  Respiratory: Lungs are clear to auscultation, Respirations are non-labored.  Cardiovascular: Normal rate, Regular rhythm, s1 + s2  Gastrointestinal: Soft, Non-tender, Non-distended, Normal bowel sounds.  Genitourinary: No costovertebral angle tenderness.  Lymphatics: No lymphadenopathy neck,   Musculoskeletal: left knee +swelling and tenderness, there is swelling and erythema upto mid medial thigh extending to his calf +pitting edema  Integumentary: No rash. ulceration to rt thumb and left middle finger, healed scan rt plantar foot  Neurologic: Alert, Oriented, No focal deficits  Psychiatric: Appropriate mood & affect.    =======================================================  Labs:                        8.5    12.84 )-----------( 201      ( 24 Aug 2022 06:15 )             25.1     08-24    136  |  101  |  41.9<H>  ----------------------------<  108<H>  4.4   |  19.0<L>  |  2.81<H>    Ca    8.7      24 Aug 2022 06:15    TPro  6.1<L>  /  Alb  2.6<L>  /  TBili  0.7  /  DBili  x   /  AST  50<H>  /  ALT  39  /  AlkPhos  171<H>  08-24      Culture - Joint Fluid (collected 08-22-22 @ 02:15)  Source: Knee Left Knee Synovial Fluid  Gram Stain (08-22-22 @ 11:47):    Moderate polymorphonuclear leukocytes per low power field    No organisms seen per oil power field    Culture - Blood (collected 08-21-22 @ 21:30)  Source: .Blood Blood-Peripheral    Culture - Blood (collected 08-21-22 @ 21:20)  Source: .Blood Blood-Peripheral       COVID-19 PCR: NotDetec (08-22-22 @ 08:10)       < from: MR Knee No Cont, Left (08.22.22 @ 23:13) >  IMPRESSION: Evaluation limited by motion artifact.    1.  Large effusion and soft tissue edema about the knee. If there is   concern for septic arthritis/osteomyelitis, aspiration should be   performed.    2.  Prominent muscle edema involving the semimembranosus and   gastrocnemius muscles, may be reactive, infectious, or could represent   muscle strain.    3.  Mild free margin blunting of the medial meniscus posterior horn. No   acute ligamentous injury.    4.  Tiny focus of likely degenerative bone marrow edema at the quadriceps   tendon insertion at the superior pole of the patella. Otherwise, no   fracture or bone marrow edema.  No focal cartilage loss.    --- End of Report ---        < end of copied text >    < from: US Duplex Venous Lower Ext Ltd, Left (08.23.22 @ 18:35) >  FINDINGS:    There is normal compressibility of the left common femoral, femoral and   popliteal veins.  The contralateral common femoral vein is patent.  Doppler examination shows normal spontaneous and phasic flow.    No calf vein thrombosis is detected.    Incidental note is made of effusion in the left knee.    IMPRESSION:  No evidence of left lower extremity deep venous thrombosis.  Left knee effusion is seen.    --- End of Report ---    < end of copied text >    < from: Xray Hand 3 Views, Bilateral (08.22.22 @ 12:52) >  IMPRESSION: There is a mildly effusion. No acute fracture.    There is a curious tubular calcific density seen anteromedially around   the left knee. Exact significance uncertain.    --- End of Report ---      < end of copied text >

## 2022-08-23 NOTE — PROGRESS NOTE ADULT - ASSESSMENT
57 yr old M w/a PMH of T1DM presents with left knee pain.  At home he takes Tresiba 28 units in the morning and 3-4 units of Humalog with each meal, TID.    1. Controlled T1DM, a1c 6.4%  - Continue Lantus 28 units in morning  - Continue Admelog 4 units TID with meals  - SSI    2. Septic joint/cellulitis   - S/p Tap, MRI  - Continue IV abx  - Awaiting culture results    3. ESTUARDO   - Likely from sepsis  - IVF

## 2022-08-24 LAB
ALBUMIN SERPL ELPH-MCNC: 2.6 G/DL — LOW (ref 3.3–5.2)
ALP SERPL-CCNC: 171 U/L — HIGH (ref 40–120)
ALT FLD-CCNC: 39 U/L — SIGNIFICANT CHANGE UP
ANION GAP SERPL CALC-SCNC: 16 MMOL/L — SIGNIFICANT CHANGE UP (ref 5–17)
AST SERPL-CCNC: 50 U/L — HIGH
BASOPHILS # BLD AUTO: 0.02 K/UL — SIGNIFICANT CHANGE UP (ref 0–0.2)
BASOPHILS NFR BLD AUTO: 0.2 % — SIGNIFICANT CHANGE UP (ref 0–2)
BILIRUB SERPL-MCNC: 0.7 MG/DL — SIGNIFICANT CHANGE UP (ref 0.4–2)
BUN SERPL-MCNC: 41.9 MG/DL — HIGH (ref 8–20)
CALCIUM SERPL-MCNC: 8.7 MG/DL — SIGNIFICANT CHANGE UP (ref 8.4–10.5)
CHLORIDE SERPL-SCNC: 101 MMOL/L — SIGNIFICANT CHANGE UP (ref 98–107)
CO2 SERPL-SCNC: 19 MMOL/L — LOW (ref 22–29)
CREAT SERPL-MCNC: 2.81 MG/DL — HIGH (ref 0.5–1.3)
CRP SERPL-MCNC: 298 MG/L — HIGH
EGFR: 26 ML/MIN/1.73M2 — LOW
EOSINOPHIL # BLD AUTO: 0.04 K/UL — SIGNIFICANT CHANGE UP (ref 0–0.5)
EOSINOPHIL NFR BLD AUTO: 0.3 % — SIGNIFICANT CHANGE UP (ref 0–6)
GLUCOSE BLDC GLUCOMTR-MCNC: 100 MG/DL — HIGH (ref 70–99)
GLUCOSE BLDC GLUCOMTR-MCNC: 121 MG/DL — HIGH (ref 70–99)
GLUCOSE BLDC GLUCOMTR-MCNC: 122 MG/DL — HIGH (ref 70–99)
GLUCOSE BLDC GLUCOMTR-MCNC: 131 MG/DL — HIGH (ref 70–99)
GLUCOSE SERPL-MCNC: 108 MG/DL — HIGH (ref 70–99)
GRAM STN FLD: SIGNIFICANT CHANGE UP
HCT VFR BLD CALC: 25.1 % — LOW (ref 39–50)
HGB BLD-MCNC: 8.5 G/DL — LOW (ref 13–17)
IMM GRANULOCYTES NFR BLD AUTO: 0.6 % — SIGNIFICANT CHANGE UP (ref 0–1.5)
LYMPHOCYTES # BLD AUTO: 0.49 K/UL — LOW (ref 1–3.3)
LYMPHOCYTES # BLD AUTO: 3.8 % — LOW (ref 13–44)
MCHC RBC-ENTMCNC: 30.1 PG — SIGNIFICANT CHANGE UP (ref 27–34)
MCHC RBC-ENTMCNC: 33.9 GM/DL — SIGNIFICANT CHANGE UP (ref 32–36)
MCV RBC AUTO: 89 FL — SIGNIFICANT CHANGE UP (ref 80–100)
METHOD TYPE: SIGNIFICANT CHANGE UP
MONOCYTES # BLD AUTO: 0.98 K/UL — HIGH (ref 0–0.9)
MONOCYTES NFR BLD AUTO: 7.6 % — SIGNIFICANT CHANGE UP (ref 2–14)
MSSA DNA SPEC QL NAA+PROBE: SIGNIFICANT CHANGE UP
NEUTROPHILS # BLD AUTO: 11.23 K/UL — HIGH (ref 1.8–7.4)
NEUTROPHILS NFR BLD AUTO: 87.5 % — HIGH (ref 43–77)
PLATELET # BLD AUTO: 201 K/UL — SIGNIFICANT CHANGE UP (ref 150–400)
POTASSIUM SERPL-MCNC: 4.4 MMOL/L — SIGNIFICANT CHANGE UP (ref 3.5–5.3)
POTASSIUM SERPL-SCNC: 4.4 MMOL/L — SIGNIFICANT CHANGE UP (ref 3.5–5.3)
PROCALCITONIN SERPL-MCNC: 2.05 NG/ML — HIGH (ref 0.02–0.1)
PROT SERPL-MCNC: 6.1 G/DL — LOW (ref 6.6–8.7)
RBC # BLD: 2.82 M/UL — LOW (ref 4.2–5.8)
RBC # FLD: 12.1 % — SIGNIFICANT CHANGE UP (ref 10.3–14.5)
RHEUMATOID FACT SERPL-ACNC: 22 IU/ML — HIGH (ref 0–13)
SODIUM SERPL-SCNC: 136 MMOL/L — SIGNIFICANT CHANGE UP (ref 135–145)
URATE SERPL-MCNC: 6 MG/DL — SIGNIFICANT CHANGE UP (ref 3.4–7)
WBC # BLD: 12.84 K/UL — HIGH (ref 3.8–10.5)
WBC # FLD AUTO: 12.84 K/UL — HIGH (ref 3.8–10.5)

## 2022-08-24 PROCEDURE — 99233 SBSQ HOSP IP/OBS HIGH 50: CPT

## 2022-08-24 PROCEDURE — 99232 SBSQ HOSP IP/OBS MODERATE 35: CPT

## 2022-08-24 RX ORDER — AMLODIPINE BESYLATE 2.5 MG/1
5 TABLET ORAL DAILY
Refills: 0 | Status: DISCONTINUED | OUTPATIENT
Start: 2022-08-24 | End: 2022-08-30

## 2022-08-24 RX ORDER — ACETAMINOPHEN 500 MG
1000 TABLET ORAL ONCE
Refills: 0 | Status: COMPLETED | OUTPATIENT
Start: 2022-08-24 | End: 2022-08-24

## 2022-08-24 RX ORDER — CEFAZOLIN SODIUM 1 G
2000 VIAL (EA) INJECTION EVERY 12 HOURS
Refills: 0 | Status: DISCONTINUED | OUTPATIENT
Start: 2022-08-24 | End: 2022-08-30

## 2022-08-24 RX ORDER — ACETAMINOPHEN 500 MG
325 TABLET ORAL ONCE
Refills: 0 | Status: COMPLETED | OUTPATIENT
Start: 2022-08-24 | End: 2022-08-24

## 2022-08-24 RX ADMIN — HEPARIN SODIUM 5000 UNIT(S): 5000 INJECTION INTRAVENOUS; SUBCUTANEOUS at 14:21

## 2022-08-24 RX ADMIN — MORPHINE SULFATE 2 MILLIGRAM(S): 50 CAPSULE, EXTENDED RELEASE ORAL at 16:07

## 2022-08-24 RX ADMIN — Medication 650 MILLIGRAM(S): at 21:57

## 2022-08-24 RX ADMIN — Medication 1000 MILLIGRAM(S): at 16:06

## 2022-08-24 RX ADMIN — INSULIN GLARGINE 28 UNIT(S): 100 INJECTION, SOLUTION SUBCUTANEOUS at 08:33

## 2022-08-24 RX ADMIN — Medication 650 MILLIGRAM(S): at 06:28

## 2022-08-24 RX ADMIN — MORPHINE SULFATE 2 MILLIGRAM(S): 50 CAPSULE, EXTENDED RELEASE ORAL at 14:31

## 2022-08-24 RX ADMIN — PIPERACILLIN AND TAZOBACTAM 25 GRAM(S): 4; .5 INJECTION, POWDER, LYOPHILIZED, FOR SOLUTION INTRAVENOUS at 06:08

## 2022-08-24 RX ADMIN — AMLODIPINE BESYLATE 5 MILLIGRAM(S): 2.5 TABLET ORAL at 15:00

## 2022-08-24 RX ADMIN — SODIUM CHLORIDE 75 MILLILITER(S): 9 INJECTION INTRAMUSCULAR; INTRAVENOUS; SUBCUTANEOUS at 06:08

## 2022-08-24 RX ADMIN — Medication 1 APPLICATION(S): at 14:32

## 2022-08-24 RX ADMIN — Medication 400 MILLIGRAM(S): at 15:03

## 2022-08-24 RX ADMIN — HEPARIN SODIUM 5000 UNIT(S): 5000 INJECTION INTRAVENOUS; SUBCUTANEOUS at 21:58

## 2022-08-24 RX ADMIN — Medication 100 MILLIGRAM(S): at 14:21

## 2022-08-24 RX ADMIN — Medication 650 MILLIGRAM(S): at 07:00

## 2022-08-24 RX ADMIN — Medication 4 UNIT(S): at 16:49

## 2022-08-24 RX ADMIN — HEPARIN SODIUM 5000 UNIT(S): 5000 INJECTION INTRAVENOUS; SUBCUTANEOUS at 06:08

## 2022-08-24 NOTE — PROGRESS NOTE ADULT - ASSESSMENT
57 yr old M w/a PMH of Type 1 DM presents with sepsis likely secondary to septic joint.     #Sepsis.   Patient meeting sepsis criteria, fever and elevated WBC count.  likely Source is left knee infection/ cellulitis /? Septic joint.  Blood cx (+) Gram positive cocci cluster  Consulted Orhto   No immediate orthopedic surgical intervention necessary at this time  Chest xray lungs are clear  UA pending .  no urinary symptoms   COVID negative.    Likely source is left knee s/p arthrocentesis, cell count however inconsistent.    F/U cultures.  WBC    S/p Tap  Joint fluid , RBC <1000, Neutrophil 97%. Negative for crystal   Continue broad spectrum abx   follow  culture results  MRI of left knee non contrast as above  on Vanco and Zosyn renally dosed.  Consulting ID   Consulting Rheumatology    #ESTUARDO (acute kidney injury).    baseline creatinine appears to be 1.3   Currently patient has ESTUARDO, likely from sepsis.    Patient urinating now.    Continue IV fluids 150 cc/hr.    #Mild  Rhabdomyolysis   elevated CPK     # DM (diabetes mellitus), type 1.    Hgba1c of 6.4   Endocrine consult placed.    28 units of Lantus in AM and 4 units premeal  Moderate sliding scale.    # Hyponatremia \  improving likely 2/2 dehydration       DVT Prophylaxis c/w Heparin            57 yr old M w/a PMH of Type 1 DM presents with sepsis likely secondary to septic joint.     #Sepsis.   Patient meeting sepsis criteria, fever and elevated WBC count.  likely Source is left knee infection/ cellulitis /? Septic joint.  Blood cx (+) Gram positive cocci cluster  Consulted Orhto   No immediate orthopedic surgical intervention necessary at this time  Chest xray lungs are clear  UA pending .  no urinary symptoms   COVID negative.    Likely source is left knee s/p arthrocentesis, cell count however inconsistent.    F/U cultures.  WBC    S/p Tap  Joint fluid , RBC <1000, Neutrophil 97%. Negative for crystal   Continue broad spectrum abx   follow  culture results  MRI of left knee non contrast as above  on Vanco and Zosyn renally dosed.  Consulted  ID   Consulted  Rheumatology    #ESTUARDO (acute kidney injury).    baseline creatinine appears to be 1.3   Currently patient has ESTUARDO, likely from sepsis.    Patient urinating now.    Continue IV fluids 150 cc/hr.    #Mild  Rhabdomyolysis   elevated CPK     # DM (diabetes mellitus), type 1.    Hgba1c of 6.4   Endocrine consult placed.    28 units of Lantus in AM and 4 units premeal  Moderate sliding scale.    # Hyponatremia \  improving likely 2/2 dehydration       DVT Prophylaxis c/w Heparin            57 yr old M w/a PMH of Type 1 DM presents with sepsis likely secondary to septic joint.     #Sepsis.   Patient meeting sepsis criteria, fever and elevated WBC count.  likely Source is left knee infection/ cellulitis /? Septic joint.  Blood cx (+) Gram positive cocci cluster  Consulted Orhto   No immediate orthopedic surgical intervention necessary at this time  Chest xray lungs are clear  UA pending .  no urinary symptoms   COVID negative.    Likely source is left knee s/p arthrocentesis, cell count however inconsistent.    F/U cultures.  WBC    S/p Tap  Joint fluid , RBC <1000, Neutrophil 97%. Negative for crystal   Continue broad spectrum abx   follow  culture results  MRI of left knee non contrast as above  on Vanco and Zosyn renally dosed.  Consulted  ID   Consulted  Rheumatology  repeat blood cx    #ESTUARDO (acute kidney injury).    baseline creatinine appears to be 1.3   Currently patient has ESTUARDO, likely from sepsis.    Patient urinating now.    Continue IV fluids 75cc/hr.    #Mild  Rhabdomyolysis   elevated CPK     # DM (diabetes mellitus), type 1.    Hgba1c of 6.4   Endocrine consult placed.    28 units of Lantus in AM and 4 units premeals  Moderate sliding scale.    # Hyponatremia \  improving likely 2/2 dehydration       # HTN  start Amlodipine       DVT Prophylaxis c/w Heparin

## 2022-08-24 NOTE — PROGRESS NOTE ADULT - SUBJECTIVE AND OBJECTIVE BOX
CC: Follow up diabetes management     INTERVAL HPI/OVERNIGHT EVENTS:  FS well controlled  C/o left knee pain    ROS: Patient denies chest pain, SOB, abd pain, N/V    MEDICATIONS  (STANDING):  cadexomer iodine 0.9% Gel 1 Application(s) Topical daily  dextrose 5%. 1000 milliLiter(s) (100 mL/Hr) IV Continuous <Continuous>  dextrose 5%. 1000 milliLiter(s) (50 mL/Hr) IV Continuous <Continuous>  dextrose 50% Injectable 25 Gram(s) IV Push once  dextrose 50% Injectable 12.5 Gram(s) IV Push once  dextrose 50% Injectable 25 Gram(s) IV Push once  glucagon  Injectable 1 milliGRAM(s) IntraMuscular once  heparin   Injectable 5000 Unit(s) SubCutaneous every 8 hours  insulin glargine Injectable (LANTUS) 28 Unit(s) SubCutaneous every morning  insulin lispro (ADMELOG) corrective regimen sliding scale   SubCutaneous three times a day before meals  insulin lispro Injectable (ADMELOG) 4 Unit(s) SubCutaneous three times a day before meals  naloxone Injectable 0.4 milliGRAM(s) IV Push once  piperacillin/tazobactam IVPB.. 3.375 Gram(s) IV Intermittent every 8 hours  polyethylene glycol 3350 17 Gram(s) Oral daily  senna 2 Tablet(s) Oral at bedtime  sodium chloride 0.9%. 1000 milliLiter(s) (75 mL/Hr) IV Continuous <Continuous>  vancomycin  IVPB 1000 milliGRAM(s) IV Intermittent every 24 hours    MEDICATIONS  (PRN):  acetaminophen     Tablet .. 650 milliGRAM(s) Oral every 6 hours PRN Temp greater or equal to 38C (100.4F), Mild Pain (1 - 3)  bisacodyl 5 milliGRAM(s) Oral daily PRN Constipation  dextrose Oral Gel 15 Gram(s) Oral once PRN Blood Glucose LESS THAN 70 milliGRAM(s)/deciliter  morphine  - Injectable 2 milliGRAM(s) IV Push every 4 hours PRN Moderate Pain (4 - 6)    Allergies  No Known Allergies    Vital Signs Last 24 Hrs  T(C): 37.3 (24 Aug 2022 10:08), Max: 38.2 (24 Aug 2022 06:00)  T(F): 99.1 (24 Aug 2022 10:08), Max: 100.8 (24 Aug 2022 06:00)  HR: 88 (24 Aug 2022 10:05) (88 - 105)  BP: 144/76 (24 Aug 2022 10:05) (133/76 - 186/81)  BP(mean): --  RR: 18 (24 Aug 2022 10:05) (18 - 20)  SpO2: 93% (24 Aug 2022 10:05) (93% - 95%)    Parameters below as of 24 Aug 2022 10:05  Patient On (Oxygen Delivery Method): room air      PHYSICAL EXAM:  General: No apparent distress  Neck: Supple, trachea midline, no thyromegaly  Respiratory: Lungs clear bilaterally, normal rate, effort  Cardiac: +S1, S2, no m/r/g  GI: +BS, soft, non tender, non distended  Extremities: Left knee edema, warm to touch, slight redness  Neuro: A+O X3, no tremor    LABS:                        8.5    12.84 )-----------( 201      ( 24 Aug 2022 06:15 )             25.1     08-24    136  |  101  |  41.9<H>  ----------------------------<  108<H>  4.4   |  19.0<L>  |  2.81<H>    Ca    8.7      24 Aug 2022 06:15    TPro  6.1<L>  /  Alb  2.6<L>  /  TBili  0.7  /  DBili  x   /  AST  50<H>  /  ALT  39  /  AlkPhos  171<H>  08-24    POCT Blood Glucose.: 121 mg/dL (08-24-22 @ 07:56)  POCT Blood Glucose.: 114 mg/dL (08-23-22 @ 16:29)  POCT Blood Glucose.: 96 mg/dL (08-23-22 @ 11:50)

## 2022-08-24 NOTE — PROGRESS NOTE ADULT - ASSESSMENT
57 yr old M w/a PMH of type 1 DM on Treba presents with left knee pain.  Patient states that he woke up on Saturday with significant left knee pain, throughout the day the pain got worse with increased swelling and redness.  He reports difficulty ambulating on the knee and fever and chills. MRI left knee showed large left effusion s/p arthrocentesis with low cell counts. Blood cultures now + for MSsa    Bacteremia  Fever  Leukocytosis  Left knee effusion  Left leg cellulitis  ESTUARDO/CKD    - BCX +MSSA  - f/u repeat BCX  - TTE  - left knee arthrocentesis does not appear consistent with septic arthritis, f/u CX. ? reactive arthritis  - f/u tick panel  - dc vanco/zosyn-->cefazolin adjusted for renal function  - trend ESR , CRP  - will monitor clinically. repeat CT if worsening LLE swelling  - rheum eval  - Trend Fever  - Trend Leukocytosis    d/w pt, pharmacy  Will Follow

## 2022-08-24 NOTE — PROGRESS NOTE ADULT - SUBJECTIVE AND OBJECTIVE BOX
[facility] Division of Hospital Medicine    Chief Complaint:      SUBJECTIVE / OVERNIGHT EVENTS:  Patient seen and examined at bedside   febrile yesterday   Patient c/o chills  c/o lower back pain   left kne pain is controlled     PHYSICAL EXAM:  Vital Signs Last 24 Hrs  T(C): 37.2 (24 Aug 2022 15:57), Max: 38.2 (24 Aug 2022 06:00)  T(F): 99 (24 Aug 2022 15:57), Max: 100.8 (24 Aug 2022 06:00)  HR: 90 (24 Aug 2022 15:57) (88 - 105)  BP: 158/82 (24 Aug 2022 15:57) (133/76 - 186/81)  BP(mean): --  RR: 18 (24 Aug 2022 15:57) (18 - 20)  SpO2: 94% (24 Aug 2022 15:57) (93% - 95%)  Parameters below as of 24 Aug 2022 15:57  Patient On (Oxygen Delivery Method): room air  CONSTITUTIONAL: NAD, well-developed, well-groomed  ENMT: Moist oral mucosa, no pharyngeal injection or exudates; normal dentition  RESPIRATORY: Normal respiratory effort; lungs are clear to auscultation bilaterally  CARDIOVASCULAR: Regular rate and rhythm, normal S1 and S2, no murmur/rub/gallop; No lower extremity edema; Peripheral pulses are 2+ bilaterally  ABDOMEN: Nontender to palpation, normoactive bowel sounds, no rebound/guarding; No hepatosplenomegaly  MUSCLOSKELETAL:  Normal gait; no clubbing or cyanosis of digits; no joint swelling or tenderness to palpation  PSYCH: A+O to person, place, and time; affect appropriate  NEUROLOGY: CN 2-12 are intact and symmetric; no gross sensory deficits;   SKIN: No rashes; no palpable lesions                          8.5    12.84 )-----------( 201      ( 24 Aug 2022 06:15 )             25.1     08-24    136  |  101  |  41.9<H>  ----------------------------<  108<H>  4.4   |  19.0<L>  |  2.81<H>    Ca    8.7      24 Aug 2022 06:15    TPro  6.1<L>  /  Alb  2.6<L>  /  TBili  0.7  /  DBili  x   /  AST  50<H>  /  ALT  39  /  AlkPhos  171<H>  08-24      CARDIAC MARKERS ( 23 Aug 2022 09:15 )  x     / x     / 574 U/L / x     / 3.1 ng/mL  CARDIAC MARKERS ( 23 Aug 2022 02:19 )  x     / x     / 563 U/L / x     / 3.1 ng/mL          Culture - Joint Fluid (collected 22 Aug 2022 02:15)  Source: Knee Left Knee Synovial Fluid  Gram Stain (22 Aug 2022 11:47):    Moderate polymorphonuclear leukocytes per low power field    No organisms seen per oil power field  Preliminary Report (23 Aug 2022 09:27):    No growth    Culture - Blood (collected 21 Aug 2022 21:30)  Source: .Blood Blood-Peripheral  Gram Stain (24 Aug 2022 09:30):    Growth in anaerobic bottle: Gram Positive Cocci in Clusters  Preliminary Report (24 Aug 2022 09:31):    Growth in anaerobic bottle: Gram Positive Cocci in Clusters    ***Blood Panel PCR results on this specimen are available    approximately 3 hours after the Gram stain result.***    Gram stain, PCR, and/or culture results may not always    correspond due todifference in methodologies.    ************************************************************    This PCR assay was performed by multiplex PCR. This    Assay tests for 66 bacterial and resistance gene targets.    Please refer to the [facility] testdirectory    at [email or web address] for details.  Organism: Blood Culture PCR (24 Aug 2022 12:01)  Organism: Blood Culture PCR (24 Aug 2022 12:01)    Culture - Blood (collected 21 Aug 2022 21:20)  Source: .Blood Blood-Peripheral  Preliminary Report (23 Aug 2022 07:01):    No growth to date.      CAPILLARY BLOOD GLUCOSE      POCT Blood Glucose.: 122 mg/dL (24 Aug 2022 11:39)  POCT Blood Glucose.: 121 mg/dL (24 Aug 2022 07:56)  POCT Blood Glucose.: 114 mg/dL (23 Aug 2022 16:29)      MEDICATIONS  (STANDING):  amLODIPine   Tablet 5 milliGRAM(s) Oral daily  cadexomer iodine 0.9% Gel 1 Application(s) Topical daily  ceFAZolin   IVPB 2000 milliGRAM(s) IV Intermittent every 12 hours  dextrose 5%. 1000 milliLiter(s) (100 mL/Hr) IV Continuous <Continuous>  dextrose 5%. 1000 milliLiter(s) (50 mL/Hr) IV Continuous <Continuous>  dextrose 50% Injectable 25 Gram(s) IV Push once  dextrose 50% Injectable 12.5 Gram(s) IV Push once  dextrose 50% Injectable 25 Gram(s) IV Push once  glucagon  Injectable 1 milliGRAM(s) IntraMuscular once  heparin   Injectable 5000 Unit(s) SubCutaneous every 8 hours  insulin glargine Injectable (LANTUS) 28 Unit(s) SubCutaneous every morning  insulin lispro (ADMELOG) corrective regimen sliding scale   SubCutaneous three times a day before meals  insulin lispro Injectable (ADMELOG) 4 Unit(s) SubCutaneous three times a day before meals  naloxone Injectable 0.4 milliGRAM(s) IV Push once  polyethylene glycol 3350 17 Gram(s) Oral daily  senna 2 Tablet(s) Oral at bedtime  sodium chloride 0.9%. 1000 milliLiter(s) (75 mL/Hr) IV Continuous <Continuous>    MEDICATIONS  (PRN):  acetaminophen     Tablet .. 650 milliGRAM(s) Oral every 6 hours PRN Temp greater or equal to 38C (100.4F), Mild Pain (1 - 3)  bisacodyl 5 milliGRAM(s) Oral daily PRN Constipation  dextrose Oral Gel 15 Gram(s) Oral once PRN Blood Glucose LESS THAN 70 milliGRAM(s)/deciliter  morphine  - Injectable 2 milliGRAM(s) IV Push every 4 hours PRN Moderate Pain (4 - 6)      I&O's Summary    24 Aug 2022 07:01  -  24 Aug 2022 16:20  --------------------------------------------------------  IN: 960 mL / OUT: 0 mL / NET: 960 mL                                                 [facility] Division of Hospital Medicine    Chief Complaint:      SUBJECTIVE / OVERNIGHT EVENTS:  Patient seen and examined at bedside   febrile yesterday   Patient c/o chills  c/o lower back pain   left kne pain is controlled     PHYSICAL EXAM:  Vital Signs Last 24 Hrs  T(C): 37.2 (24 Aug 2022 15:57), Max: 38.2 (24 Aug 2022 06:00)  T(F): 99 (24 Aug 2022 15:57), Max: 100.8 (24 Aug 2022 06:00)  HR: 90 (24 Aug 2022 15:57) (88 - 105)  BP: 158/82 (24 Aug 2022 15:57) (133/76 - 186/81)  BP(mean): --  RR: 18 (24 Aug 2022 15:57) (18 - 20)  SpO2: 94% (24 Aug 2022 15:57) (93% - 95%)  Parameters below as of 24 Aug 2022 15:57  Patient On (Oxygen Delivery Method): room air  CONSTITUTIONAL: NAD, well-developed, well-groomed  ENMT: Moist oral mucosa, no pharyngeal injection or exudates; normal dentition  RESPIRATORY: Normal respiratory effort; lungs are clear to auscultation bilaterally  CARDIOVASCULAR: Regular rate and rhythm, normal S1 and S2, no murmur/rub/gallop; No lower extremity edema; Peripheral pulses are 2+ bilaterally  ABDOMEN: Nontender to palpation, normoactive bowel sounds, no rebound/guarding; No hepatosplenomegaly  MUSCLOSKELETAL:  Normal gait; no clubbing or cyanosis of digits; no joint swelling or tenderness to palpation  Left knee  medial aspect erythema, redness and swelling  PSYCH: A+O to person, place, and time; affect appropriate  NEUROLOGY: CN 2-12 are intact and symmetric; no gross sensory deficits;   SKIN: No rashes; no palpable lesions                          8.5    12.84 )-----------( 201      ( 24 Aug 2022 06:15 )             25.1     08-24    136  |  101  |  41.9<H>  ----------------------------<  108<H>  4.4   |  19.0<L>  |  2.81<H>    Ca    8.7      24 Aug 2022 06:15    TPro  6.1<L>  /  Alb  2.6<L>  /  TBili  0.7  /  DBili  x   /  AST  50<H>  /  ALT  39  /  AlkPhos  171<H>  08-24      CARDIAC MARKERS ( 23 Aug 2022 09:15 )  x     / x     / 574 U/L / x     / 3.1 ng/mL  CARDIAC MARKERS ( 23 Aug 2022 02:19 )  x     / x     / 563 U/L / x     / 3.1 ng/mL          Culture - Joint Fluid (collected 22 Aug 2022 02:15)  Source: Knee Left Knee Synovial Fluid  Gram Stain (22 Aug 2022 11:47):    Moderate polymorphonuclear leukocytes per low power field    No organisms seen per oil power field  Preliminary Report (23 Aug 2022 09:27):    No growth    Culture - Blood (collected 21 Aug 2022 21:30)  Source: .Blood Blood-Peripheral  Gram Stain (24 Aug 2022 09:30):    Growth in anaerobic bottle: Gram Positive Cocci in Clusters  Preliminary Report (24 Aug 2022 09:31):    Growth in anaerobic bottle: Gram Positive Cocci in Clusters    ***Blood Panel PCR results on this specimen are available    approximately 3 hours after the Gram stain result.***    Gram stain, PCR, and/or culture results may not always    correspond due todifference in methodologies.    ************************************************************    This PCR assay was performed by multiplex PCR. This    Assay tests for 66 bacterial and resistance gene targets.    Please refer to the [facility] testdirectory    at [email or web address] for details.  Organism: Blood Culture PCR (24 Aug 2022 12:01)  Organism: Blood Culture PCR (24 Aug 2022 12:01)    Culture - Blood (collected 21 Aug 2022 21:20)  Source: .Blood Blood-Peripheral  Preliminary Report (23 Aug 2022 07:01):    No growth to date.      CAPILLARY BLOOD GLUCOSE      POCT Blood Glucose.: 122 mg/dL (24 Aug 2022 11:39)  POCT Blood Glucose.: 121 mg/dL (24 Aug 2022 07:56)  POCT Blood Glucose.: 114 mg/dL (23 Aug 2022 16:29)      MEDICATIONS  (STANDING):  amLODIPine   Tablet 5 milliGRAM(s) Oral daily  cadexomer iodine 0.9% Gel 1 Application(s) Topical daily  ceFAZolin   IVPB 2000 milliGRAM(s) IV Intermittent every 12 hours  dextrose 5%. 1000 milliLiter(s) (100 mL/Hr) IV Continuous <Continuous>  dextrose 5%. 1000 milliLiter(s) (50 mL/Hr) IV Continuous <Continuous>  dextrose 50% Injectable 25 Gram(s) IV Push once  dextrose 50% Injectable 12.5 Gram(s) IV Push once  dextrose 50% Injectable 25 Gram(s) IV Push once  glucagon  Injectable 1 milliGRAM(s) IntraMuscular once  heparin   Injectable 5000 Unit(s) SubCutaneous every 8 hours  insulin glargine Injectable (LANTUS) 28 Unit(s) SubCutaneous every morning  insulin lispro (ADMELOG) corrective regimen sliding scale   SubCutaneous three times a day before meals  insulin lispro Injectable (ADMELOG) 4 Unit(s) SubCutaneous three times a day before meals  naloxone Injectable 0.4 milliGRAM(s) IV Push once  polyethylene glycol 3350 17 Gram(s) Oral daily  senna 2 Tablet(s) Oral at bedtime  sodium chloride 0.9%. 1000 milliLiter(s) (75 mL/Hr) IV Continuous <Continuous>    MEDICATIONS  (PRN):  acetaminophen     Tablet .. 650 milliGRAM(s) Oral every 6 hours PRN Temp greater or equal to 38C (100.4F), Mild Pain (1 - 3)  bisacodyl 5 milliGRAM(s) Oral daily PRN Constipation  dextrose Oral Gel 15 Gram(s) Oral once PRN Blood Glucose LESS THAN 70 milliGRAM(s)/deciliter  morphine  - Injectable 2 milliGRAM(s) IV Push every 4 hours PRN Moderate Pain (4 - 6)      I&O's Summary    24 Aug 2022 07:01  -  24 Aug 2022 16:20  --------------------------------------------------------  IN: 960 mL / OUT: 0 mL / NET: 960 mL

## 2022-08-24 NOTE — PROGRESS NOTE ADULT - SUBJECTIVE AND OBJECTIVE BOX
[facility]                                                INFECTIOUS DISEASES  =======================================================                               Rodger Marino MD#  Hugo Collado MD*                                     Ivon Romano MD*    Faviola Lopez MD*            Diplomates American Board of Internal Medicine & Infectious Diseases                  # [place] Office - Appt - Tel  [phone] Fax [phone]                * [place] Office - Appt - Tel [phone] Fax [phone]                                  Hospital Consult line:  [phone]  =======================================================      [id number]  HAILEY CARD   follow up for: left leg swelling, left knee effusion  feels LLE is more swollen today, although appears less erythematous  low grade fever this am  reports prior h/o facial and foot cellulitis  patient seen and examined.       I have personally reviewed the labs and data; pertinent labs and data are listed in this note; please see below.   ===================================================  REVIEW OF SYSTEMS:  CONSTITUTIONAL:  No Fever or chills  HEENT:  No diplopia or blurred vision.  No earache, sore throat or runny nose.  CARDIOVASCULAR:  No pressure, squeezing, strangling, tightness, heaviness or aching about the chest, neck, axilla or epigastrium.  RESPIRATORY:  No cough, shortness of breath  GASTROINTESTINAL:  No nausea, vomiting or diarrhea.  GENITOURINARY:  No dysuria, frequency or urgency. No Blood in urine  MUSCULOSKELETAL:  no joint aches, no muscle pain  SKIN:  No change in skin, hair or nails.  NEUROLOGIC:  No Headaches, seizures or weakness.  PSYCHIATRIC:  No disorder of thought or mood.  ENDOCRINE:  No heat or cold intolerance  HEMATOLOGICAL:  No easy bruising or bleeding.    =======================================================  Allergies    No Known Allergies    Intolerances    Antibiotics:  ceFAZolin   IVPB 2000 milliGRAM(s) IV Intermittent every 12 hours    Other medications:  amLODIPine   Tablet 5 milliGRAM(s) Oral daily  cadexomer iodine 0.9% Gel 1 Application(s) Topical daily  dextrose 5%. 1000 milliLiter(s) IV Continuous <Continuous>  dextrose 5%. 1000 milliLiter(s) IV Continuous <Continuous>  dextrose 50% Injectable 25 Gram(s) IV Push once  dextrose 50% Injectable 12.5 Gram(s) IV Push once  dextrose 50% Injectable 25 Gram(s) IV Push once  glucagon  Injectable 1 milliGRAM(s) IntraMuscular once  heparin   Injectable 5000 Unit(s) SubCutaneous every 8 hours  insulin glargine Injectable (LANTUS) 28 Unit(s) SubCutaneous every morning  insulin lispro (ADMELOG) corrective regimen sliding scale   SubCutaneous three times a day before meals  insulin lispro Injectable (ADMELOG) 4 Unit(s) SubCutaneous three times a day before meals  naloxone Injectable 0.4 milliGRAM(s) IV Push once  polyethylene glycol 3350 17 Gram(s) Oral daily  senna 2 Tablet(s) Oral at bedtime  sodium chloride 0.9%. 1000 milliLiter(s) IV Continuous <Continuous>    ======================================================  Physical Exam:  ============  T(F): 99.4 (24 Aug 2022 21:30), Max: 100.8 (24 Aug 2022 06:00)  HR: 100 (24 Aug 2022 21:30)  BP: 166/79 (24 Aug 2022 21:30)  RR: 18 (24 Aug 2022 21:30)  SpO2: 93% (24 Aug 2022 21:30) (93% - 94%)  temp max in last 48H T(F): , Max: 100.8 (08-23-22 @ 04:37)    General:  No acute distress.  Eye: Pupils are equal, round and reactive to light, Normal conjunctiva.  HENT: Normocephalic, Oral mucosa is moist, No pharyngeal erythema, No sinus tenderness.  Neck: Supple, No lymphadenopathy.  Respiratory: Lungs are clear to auscultation, Respirations are non-labored.  Cardiovascular: Normal rate, Regular rhythm,  s1+s2  Gastrointestinal: Soft, Non-tender, Non-distended, Normal bowel sounds.  Genitourinary: No costovertebral angle tenderness.  Lymphatics: No lymphadenopathy neck,   Musculoskeletal: left knee swelling tendneress reduced ROM  Integumentary: LLE +pitting edema from lower thigh to calf +erythema over medial aspect of knee, calf and warmth-erythema appears improved. healed scabs at rigth plantar foot, ulceration of rt thumb and left middle  Neurologic: Alert, Oriented, No focal deficits  Psychiatric: Appropriate mood & affect.  =======================================================  Labs:                        8.5    12.84 )-----------( 201      ( 24 Aug 2022 06:15 )             25.1     08-24    136  |  101  |  41.9<H>  ----------------------------<  108<H>  4.4   |  19.0<L>  |  2.81<H>    Ca    8.7      24 Aug 2022 06:15    TPro  6.1<L>  /  Alb  2.6<L>  /  TBili  0.7  /  DBili  x   /  AST  50<H>  /  ALT  39  /  AlkPhos  171<H>  08-24      Culture - Joint Fluid (collected 08-22-22 @ 02:15)  Source: Knee Left Knee Synovial Fluid  Gram Stain (08-22-22 @ 11:47):    Moderate polymorphonuclear leukocytes per low power field    No organisms seen per oil power field    Culture - Blood (collected 08-21-22 @ 21:30)  Source: .Blood Blood-Peripheral  Gram Stain (08-24-22 @ 09:30):    Growth in anaerobic bottle: Gram Positive Cocci in Clusters  Organism: Blood Culture PCR (08-24-22 @ 12:01)  Organism: Blood Culture PCR (08-24-22 @ 12:01)    Sensitivities:      -  Methicillin SENSITIVE Staphylococcus aureus (MSSA): Detec Any isolate of Staphylococcus aureus from a blood culture is NOT considered a contaminant.      Method Type: PCR    Culture - Blood (collected 08-21-22 @ 21:20)  Source: .Blood Blood-Peripheral      < from: MR Knee No Cont, Left (08.22.22 @ 23:13) >  1.  Large effusion and soft tissue edema about the knee. If there is   concern for septic arthritis/osteomyelitis, aspiration should be   performed.    2.  Prominent muscle edema involving the semimembranosus and   gastrocnemius muscles, may be reactive, infectious, or could represent   muscle strain.    3.  Mild free margin blunting of the medial meniscus posterior horn. No   acute ligamentous injury.    4.  Tiny focus of likely degenerative bone marrow edema at the quadriceps   tendon insertion at the superior pole of the patella. Otherwise, no   fracture or bone marrow edema.  No focal cartilage loss.    --- End of Report ---    < end of copied text >

## 2022-08-24 NOTE — PROGRESS NOTE ADULT - ASSESSMENT
57 yr old M w/a PMH of T1DM presents with left knee pain.  At home he takes Tresiba 28 units in the morning and 3-4 units of Humalog with each meal, TID.    1. Controlled T1DM, a1c 6.4%  - Continue Lantus 28 units in morning  - Continue Admelog 4 units TID with meals  - SSI    2. Septic joint/cellulitis   - Continue IV abx  - Awaiting culture results  - ID/Rheum consulted    3. ESTUARDO   - Likely from sepsis, slowing improving  - IVF

## 2022-08-25 LAB
ALBUMIN SERPL ELPH-MCNC: 2.6 G/DL — LOW (ref 3.3–5.2)
ALP SERPL-CCNC: 200 U/L — HIGH (ref 40–120)
ALT FLD-CCNC: 36 U/L — SIGNIFICANT CHANGE UP
ANA TITR SER: NEGATIVE — SIGNIFICANT CHANGE UP
ANION GAP SERPL CALC-SCNC: 12 MMOL/L — SIGNIFICANT CHANGE UP (ref 5–17)
AST SERPL-CCNC: 45 U/L — HIGH
B BURGDOR C6 AB SER-ACNC: NEGATIVE — SIGNIFICANT CHANGE UP
B BURGDOR IGG+IGM SER-ACNC: 0.04 INDEX — SIGNIFICANT CHANGE UP (ref 0.01–0.89)
BASOPHILS # BLD AUTO: 0.03 K/UL — SIGNIFICANT CHANGE UP (ref 0–0.2)
BASOPHILS NFR BLD AUTO: 0.3 % — SIGNIFICANT CHANGE UP (ref 0–2)
BILIRUB SERPL-MCNC: 0.6 MG/DL — SIGNIFICANT CHANGE UP (ref 0.4–2)
BUN SERPL-MCNC: 37.5 MG/DL — HIGH (ref 8–20)
C3 SERPL-MCNC: 168 MG/DL — HIGH (ref 81–157)
C4 SERPL-MCNC: 39 MG/DL — SIGNIFICANT CHANGE UP (ref 13–39)
CALCIUM SERPL-MCNC: 8.6 MG/DL — SIGNIFICANT CHANGE UP (ref 8.4–10.5)
CHLORIDE SERPL-SCNC: 103 MMOL/L — SIGNIFICANT CHANGE UP (ref 98–107)
CO2 SERPL-SCNC: 21 MMOL/L — LOW (ref 22–29)
CREAT SERPL-MCNC: 2.59 MG/DL — HIGH (ref 0.5–1.3)
EGFR: 28 ML/MIN/1.73M2 — LOW
EOSINOPHIL # BLD AUTO: 0.15 K/UL — SIGNIFICANT CHANGE UP (ref 0–0.5)
EOSINOPHIL NFR BLD AUTO: 1.3 % — SIGNIFICANT CHANGE UP (ref 0–6)
GLUCOSE BLDC GLUCOMTR-MCNC: 106 MG/DL — HIGH (ref 70–99)
GLUCOSE BLDC GLUCOMTR-MCNC: 116 MG/DL — HIGH (ref 70–99)
GLUCOSE BLDC GLUCOMTR-MCNC: 56 MG/DL — LOW (ref 70–99)
GLUCOSE BLDC GLUCOMTR-MCNC: 57 MG/DL — LOW (ref 70–99)
GLUCOSE BLDC GLUCOMTR-MCNC: 81 MG/DL — SIGNIFICANT CHANGE UP (ref 70–99)
GLUCOSE BLDC GLUCOMTR-MCNC: 81 MG/DL — SIGNIFICANT CHANGE UP (ref 70–99)
GLUCOSE BLDC GLUCOMTR-MCNC: 93 MG/DL — SIGNIFICANT CHANGE UP (ref 70–99)
GLUCOSE SERPL-MCNC: 75 MG/DL — SIGNIFICANT CHANGE UP (ref 70–99)
HCT VFR BLD CALC: 23.7 % — LOW (ref 39–50)
HGB BLD-MCNC: 7.7 G/DL — LOW (ref 13–17)
IMM GRANULOCYTES NFR BLD AUTO: 0.5 % — SIGNIFICANT CHANGE UP (ref 0–1.5)
LYMPHOCYTES # BLD AUTO: 0.55 K/UL — LOW (ref 1–3.3)
LYMPHOCYTES # BLD AUTO: 4.8 % — LOW (ref 13–44)
MCHC RBC-ENTMCNC: 29.5 PG — SIGNIFICANT CHANGE UP (ref 27–34)
MCHC RBC-ENTMCNC: 32.5 GM/DL — SIGNIFICANT CHANGE UP (ref 32–36)
MCV RBC AUTO: 90.8 FL — SIGNIFICANT CHANGE UP (ref 80–100)
MONOCYTES # BLD AUTO: 0.87 K/UL — SIGNIFICANT CHANGE UP (ref 0–0.9)
MONOCYTES NFR BLD AUTO: 7.6 % — SIGNIFICANT CHANGE UP (ref 2–14)
NEUTROPHILS # BLD AUTO: 9.8 K/UL — HIGH (ref 1.8–7.4)
NEUTROPHILS NFR BLD AUTO: 85.5 % — HIGH (ref 43–77)
PLATELET # BLD AUTO: 195 K/UL — SIGNIFICANT CHANGE UP (ref 150–400)
POTASSIUM SERPL-MCNC: 4.3 MMOL/L — SIGNIFICANT CHANGE UP (ref 3.5–5.3)
POTASSIUM SERPL-SCNC: 4.3 MMOL/L — SIGNIFICANT CHANGE UP (ref 3.5–5.3)
PROT SERPL-MCNC: 5.7 G/DL — LOW (ref 6.6–8.7)
RBC # BLD: 2.61 M/UL — LOW (ref 4.2–5.8)
RBC # FLD: 12.3 % — SIGNIFICANT CHANGE UP (ref 10.3–14.5)
SODIUM SERPL-SCNC: 136 MMOL/L — SIGNIFICANT CHANGE UP (ref 135–145)
T PALLIDUM AB TITR SER: NEGATIVE — SIGNIFICANT CHANGE UP
WBC # BLD: 11.46 K/UL — HIGH (ref 3.8–10.5)
WBC # FLD AUTO: 11.46 K/UL — HIGH (ref 3.8–10.5)

## 2022-08-25 PROCEDURE — 73700 CT LOWER EXTREMITY W/O DYE: CPT | Mod: 26,LT

## 2022-08-25 PROCEDURE — 99233 SBSQ HOSP IP/OBS HIGH 50: CPT

## 2022-08-25 PROCEDURE — 76882 US LMTD JT/FCL EVL NVASC XTR: CPT | Mod: 26,LT

## 2022-08-25 PROCEDURE — 99232 SBSQ HOSP IP/OBS MODERATE 35: CPT

## 2022-08-25 RX ORDER — INSULIN GLARGINE 100 [IU]/ML
22 INJECTION, SOLUTION SUBCUTANEOUS EVERY MORNING
Refills: 0 | Status: DISCONTINUED | OUTPATIENT
Start: 2022-08-25 | End: 2022-08-26

## 2022-08-25 RX ORDER — LANOLIN ALCOHOL/MO/W.PET/CERES
3 CREAM (GRAM) TOPICAL ONCE
Refills: 0 | Status: COMPLETED | OUTPATIENT
Start: 2022-08-25 | End: 2022-08-25

## 2022-08-25 RX ORDER — ACETAMINOPHEN 500 MG
1000 TABLET ORAL ONCE
Refills: 0 | Status: COMPLETED | OUTPATIENT
Start: 2022-08-25 | End: 2022-08-25

## 2022-08-25 RX ORDER — DEXTROSE 50 % IN WATER 50 %
15 SYRINGE (ML) INTRAVENOUS ONCE
Refills: 0 | Status: COMPLETED | OUTPATIENT
Start: 2022-08-25 | End: 2022-08-25

## 2022-08-25 RX ADMIN — Medication 325 MILLIGRAM(S): at 04:09

## 2022-08-25 RX ADMIN — Medication 975 MILLIGRAM(S): at 08:33

## 2022-08-25 RX ADMIN — Medication 4 UNIT(S): at 16:57

## 2022-08-25 RX ADMIN — Medication 3 MILLIGRAM(S): at 00:46

## 2022-08-25 RX ADMIN — Medication 100 MILLIGRAM(S): at 17:26

## 2022-08-25 RX ADMIN — Medication 100 MILLIGRAM(S): at 05:29

## 2022-08-25 RX ADMIN — Medication 15 GRAM(S): at 21:34

## 2022-08-25 RX ADMIN — Medication 4 UNIT(S): at 12:02

## 2022-08-25 RX ADMIN — MORPHINE SULFATE 2 MILLIGRAM(S): 50 CAPSULE, EXTENDED RELEASE ORAL at 06:30

## 2022-08-25 RX ADMIN — SODIUM CHLORIDE 75 MILLILITER(S): 9 INJECTION INTRAMUSCULAR; INTRAVENOUS; SUBCUTANEOUS at 15:41

## 2022-08-25 RX ADMIN — Medication 400 MILLIGRAM(S): at 12:58

## 2022-08-25 RX ADMIN — Medication 325 MILLIGRAM(S): at 00:03

## 2022-08-25 RX ADMIN — INSULIN GLARGINE 22 UNIT(S): 100 INJECTION, SOLUTION SUBCUTANEOUS at 11:59

## 2022-08-25 RX ADMIN — HEPARIN SODIUM 5000 UNIT(S): 5000 INJECTION INTRAVENOUS; SUBCUTANEOUS at 14:12

## 2022-08-25 RX ADMIN — Medication 650 MILLIGRAM(S): at 00:00

## 2022-08-25 RX ADMIN — Medication 1 APPLICATION(S): at 12:51

## 2022-08-25 RX ADMIN — Medication 1000 MILLIGRAM(S): at 13:38

## 2022-08-25 RX ADMIN — Medication 1000 MILLIGRAM(S): at 22:30

## 2022-08-25 RX ADMIN — AMLODIPINE BESYLATE 5 MILLIGRAM(S): 2.5 TABLET ORAL at 05:27

## 2022-08-25 RX ADMIN — HEPARIN SODIUM 5000 UNIT(S): 5000 INJECTION INTRAVENOUS; SUBCUTANEOUS at 05:27

## 2022-08-25 RX ADMIN — HEPARIN SODIUM 5000 UNIT(S): 5000 INJECTION INTRAVENOUS; SUBCUTANEOUS at 21:19

## 2022-08-25 RX ADMIN — MORPHINE SULFATE 2 MILLIGRAM(S): 50 CAPSULE, EXTENDED RELEASE ORAL at 05:31

## 2022-08-25 RX ADMIN — Medication 400 MILLIGRAM(S): at 21:14

## 2022-08-25 NOTE — PROGRESS NOTE ADULT - ASSESSMENT
57 yr old M w/a PMH of T1DM presents with left knee pain.  At home he takes Tresiba 28 units in the morning and 3-4 units of Humalog with each meal, TID.    1. Controlled T1DM, a1c 6.4%  - Poor appetite, did not eat breakfast today  - Lantus decreased to 22 units in the morning  - Continue Admelog 4 units TID with meals, hold if NPO  - SSI    2. Septic joint/cellulitis   - MSSA, now on Cefazolin  - ID following    3. ESTUARDO   - Likely from sepsis, slowing improving  - IVF @ 75cc/hr

## 2022-08-25 NOTE — PROGRESS NOTE ADULT - ASSESSMENT
57 yr old M w/a PMH of type 1 DM on Tresiba presents with left knee pain.  Patient states that he woke up on Saturday with significant left knee pain, throughout the day the pain got worse with increased swelling and redness.  He reports difficulty ambulating on the knee and fever and chills. MRI left knee showed large left effusion s/p arthrocentesis with low cell counts. Blood cultures now + for MSsa     MSSA Bacteremia  Fever  Leukocytosis  Left knee effusion  Left leg cellulitis  ESTUARDO/CKD  Sugar Diabetes     Plan:  MSSA bacteremia may be from skin source.   has chronic ulcers in the right foot. also right hand and left hand.     the left knee is inflamed., this may be the source.   repeat blood cultures ordered ,but not collected.   - will wait for this    - consider TTE    - left knee arthrocentesis does not appear consistent with septic arthritis, f/u CX. ? reactive arthritis    - f/u tick panel    continue cefazolin adjusted for renal function  - trend ESR , CRP  - will monitor clinically. repeat CT if worsening LLE swelling    - follow up all outstanding cultures  - trend temperature and WBC curve  - repeat cultures from blood and all sources if febrile.      Diabetes  - continue endocrine support.

## 2022-08-25 NOTE — PROGRESS NOTE ADULT - SUBJECTIVE AND OBJECTIVE BOX
[facility]                                                INFECTIOUS DISEASES  =======================================================                               Rodger Marino MD#  Hugo Collado MD*                                     Ivon Romano MD*    Faviola Lopez MD*            Diplomates American Board of Internal Medicine & Infectious Diseases                  # [place] Office - Appt - Tel  [phone] Fax [phone]                * [place] Office - Appt - Tel [phone] Fax [phone]                                  Hospital Consult line:  [phone]  =======================================================    [id number]  HAILEY CARD   follow up for: left leg swelling, left knee effusion; MSSA infection    still with knee pain in the LEFT side.   reports that he has multiple ulcers on his extremities.,  Has a right plantar ulcer for 6 months, followed by [facility]. healing now, and smaller.   reports that he had blood blisters on his fingers, most recently right thumb skin peeled off.  growing back slowly.       I have personally reviewed the labs and data; pertinent labs and data are listed in this note; please see below.   ===================================================  REVIEW OF SYSTEMS:  CONSTITUTIONAL:  No Fever or chills  HEENT:  No diplopia or blurred vision.  No earache, sore throat or runny nose.  CARDIOVASCULAR:  No pressure, squeezing, strangling, tightness, heaviness or aching about the chest, neck, axilla or epigastrium.  RESPIRATORY:  No cough, shortness of breath  GASTROINTESTINAL:  No nausea, vomiting or diarrhea.  GENITOURINARY:  No dysuria, frequency or urgency. No Blood in urine  MUSCULOSKELETAL:  no joint aches, no muscle pain  SKIN:   as per HPI  NEUROLOGIC:  No Headaches, seizures or weakness.  PSYCHIATRIC:  No disorder of thought or mood.  ENDOCRINE:  No heat or cold intolerance  HEMATOLOGICAL:  No easy bruising or bleeding.    =======================================================  Allergies  No Known Allergies       ======================================================  Physical Exam:  ============     General:  No acute distress.  Eye: Pupils are equal, round and reactive to light, Normal conjunctiva.  HENT: Normocephalic, Oral mucosa is moist, No pharyngeal erythema, No sinus tenderness.  Neck: Supple, No lymphadenopathy.  Respiratory: Lungs are clear to auscultation, Respirations are non-labored.  Cardiovascular: Normal rate, Regular rhythm,  s1+s2  Gastrointestinal: Soft, Non-tender, Non-distended, Normal bowel sounds.  Genitourinary: No costovertebral angle tenderness.  Lymphatics: No lymphadenopathy neck,   Musculoskeletal: left knee swelling tenderness; reduced ROM  Integumentary: LLE +pitting edema from lower thigh to calf +erythema over medial aspect of knee,   healed RIGHT plantar foot ulcer,  ulceration of rt thumb and left middle fingers  Neurologic: Alert, Oriented, No focal deficits  Psychiatric: Appropriate mood & affect.  =======================================================    Vitals:  ============  T(F): 99.9 (25 Aug 2022 04:47), Max: 101.6 (24 Aug 2022 23:50)  HR: 95 (25 Aug 2022 04:47)  BP: 155/77 (25 Aug 2022 04:47)  RR: 19 (25 Aug 2022 04:47)  SpO2: 93% (25 Aug 2022 04:47) (93% - 94%)  temp max in last 48H T(F): , Max: 101.6 (08-24-22 @ 23:50)    =======================================================  Current Antibiotics:  ceFAZolin   IVPB 2000 milliGRAM(s) IV Intermittent every 12 hours    Other medications:  acetaminophen   IVPB .. 1000 milliGRAM(s) IV Intermittent once  amLODIPine   Tablet 5 milliGRAM(s) Oral daily  cadexomer iodine 0.9% Gel 1 Application(s) Topical daily  dextrose 5%. 1000 milliLiter(s) IV Continuous <Continuous>  dextrose 5%. 1000 milliLiter(s) IV Continuous <Continuous>  dextrose 50% Injectable 25 Gram(s) IV Push once  dextrose 50% Injectable 12.5 Gram(s) IV Push once  dextrose 50% Injectable 25 Gram(s) IV Push once  glucagon  Injectable 1 milliGRAM(s) IntraMuscular once  heparin   Injectable 5000 Unit(s) SubCutaneous every 8 hours  insulin glargine Injectable (LANTUS) 22 Unit(s) SubCutaneous every morning  insulin lispro (ADMELOG) corrective regimen sliding scale   SubCutaneous three times a day before meals  insulin lispro Injectable (ADMELOG) 4 Unit(s) SubCutaneous three times a day before meals  naloxone Injectable 0.4 milliGRAM(s) IV Push once  polyethylene glycol 3350 17 Gram(s) Oral daily  senna 2 Tablet(s) Oral at bedtime  sodium chloride 0.9%. 1000 milliLiter(s) IV Continuous <Continuous>    =======================================================      Labs:                        7.7    11.46 )-----------( 195      ( 25 Aug 2022 07:00 )             23.7     08-25    136  |  103  |  37.5<H>  ----------------------------<  75  4.3   |  21.0<L>  |  2.59<H>    Ca    8.6      25 Aug 2022 07:00    TPro  5.7<L>  /  Alb  2.6<L>  /  TBili  0.6  /  DBili  x   /  AST  45<H>  /  ALT  36  /  AlkPhos  200<H>  08-25      Culture - Joint Fluid (collected 08-22-22 @ 02:15)  Source: Knee Left Knee Synovial Fluid  Gram Stain (08-22-22 @ 11:47):    Moderate polymorphonuclear leukocytes per low power field    No organisms seen per oil power field    Culture - Blood (collected 08-21-22 @ 21:30)  Source: .Blood Blood-Peripheral  Gram Stain (08-24-22 @ 09:30):    Growth in anaerobic bottle: Gram Positive Cocci in Clusters  Organism: Blood Culture PCR (08-24-22 @ 12:01)  Organism: Blood Culture PCR (08-24-22 @ 12:01)    Sensitivities:      -  Methicillin SENSITIVE Staphylococcus aureus (MSSA): Detec Any isolate of Staphylococcus aureus from a blood culture is NOT considered a contaminant.      Method Type: PCR    Culture - Blood (collected 08-21-22 @ 21:20)  Source: .Blood Blood-Peripheral        C-Reactive Protein, Serum: 298 mg/L (08-24-22 @ 06:15)  C-Reactive Protein, Serum: 299 mg/L (08-23-22 @ 09:15)  C-Reactive Protein, Serum: 302 mg/L (08-23-22 @ 02:19)  C-Reactive Protein, Serum: 261 mg/L (08-21-22 @ 21:20)    Sedimentation Rate, Erythrocyte: 57 mm/hr (08-21-22 @ 21:20)    Procalcitonin, Serum: 2.05 ng/mL (08-24-22 @ 06:15)  Procalcitonin, Serum: 2.83 ng/mL (08-23-22 @ 09:15)    COVID-19 PCR: NotDetec (08-22-22 @ 08:10)      =======================================================  A1C with Estimated Average Glucose Result: 6.4 % (08-22-22 @ 10:40)

## 2022-08-25 NOTE — PROGRESS NOTE ADULT - SUBJECTIVE AND OBJECTIVE BOX
CC: Follow up T1DM management     INTERVAL HPI/OVERNIGHT EVENTS:  Pt expresses frustration, states "I have pain all over and I feel like I am only getting worse"  Poor appetite, did not eat breakfast this morning    ROS: Patient denies chest pain, SOB, abd pain. Endorses pain in left knee, leg, joints, and fingers.     MEDICATIONS  (STANDING):  amLODIPine   Tablet 5 milliGRAM(s) Oral daily  cadexomer iodine 0.9% Gel 1 Application(s) Topical daily  ceFAZolin   IVPB 2000 milliGRAM(s) IV Intermittent every 12 hours  dextrose 5%. 1000 milliLiter(s) (100 mL/Hr) IV Continuous <Continuous>  dextrose 5%. 1000 milliLiter(s) (50 mL/Hr) IV Continuous <Continuous>  dextrose 50% Injectable 25 Gram(s) IV Push once  dextrose 50% Injectable 12.5 Gram(s) IV Push once  dextrose 50% Injectable 25 Gram(s) IV Push once  glucagon  Injectable 1 milliGRAM(s) IntraMuscular once  heparin   Injectable 5000 Unit(s) SubCutaneous every 8 hours  insulin glargine Injectable (LANTUS) 22 Unit(s) SubCutaneous every morning  insulin lispro (ADMELOG) corrective regimen sliding scale   SubCutaneous three times a day before meals  insulin lispro Injectable (ADMELOG) 4 Unit(s) SubCutaneous three times a day before meals  naloxone Injectable 0.4 milliGRAM(s) IV Push once  polyethylene glycol 3350 17 Gram(s) Oral daily  senna 2 Tablet(s) Oral at bedtime  sodium chloride 0.9%. 1000 milliLiter(s) (75 mL/Hr) IV Continuous <Continuous>    MEDICATIONS  (PRN):  acetaminophen     Tablet .. 650 milliGRAM(s) Oral every 6 hours PRN Temp greater or equal to 38C (100.4F), Mild Pain (1 - 3)  bisacodyl 5 milliGRAM(s) Oral daily PRN Constipation  dextrose Oral Gel 15 Gram(s) Oral once PRN Blood Glucose LESS THAN 70 milliGRAM(s)/deciliter  morphine  - Injectable 2 milliGRAM(s) IV Push every 4 hours PRN Moderate Pain (4 - 6)    Allergies  No Known Allergies    Vital Signs Last 24 Hrs  T(C): 37.7 (25 Aug 2022 04:47), Max: 38.7 (24 Aug 2022 23:50)  T(F): 99.9 (25 Aug 2022 04:47), Max: 101.6 (24 Aug 2022 23:50)  HR: 95 (25 Aug 2022 04:47) (90 - 100)  BP: 155/77 (25 Aug 2022 04:47) (155/77 - 166/79)  BP(mean): --  RR: 19 (25 Aug 2022 04:47) (18 - 19)  SpO2: 93% (25 Aug 2022 04:47) (93% - 94%)    Parameters below as of 24 Aug 2022 21:30  Patient On (Oxygen Delivery Method): room air        PHYSICAL EXAM:  General: No apparent distress  Neck: Supple, trachea midline, no thyromegaly  Respiratory: Lungs clear bilaterally, normal rate, effort  Cardiac: +S1, S2, no m/r/g  GI: +BS, soft, non tender, non distended  Extremities: Left knee/leg edemetous, slight redness, warm  Neuro: A+O X3, no tremor      LABS:                        7.7    11.46 )-----------( 195      ( 25 Aug 2022 07:00 )             23.7     08-25    136  |  103  |  37.5<H>  ----------------------------<  75  4.3   |  21.0<L>  |  2.59<H>    Ca    8.6      25 Aug 2022 07:00    TPro  5.7<L>  /  Alb  2.6<L>  /  TBili  0.6  /  DBili  x   /  AST  45<H>  /  ALT  36  /  AlkPhos  200<H>  08-25      POCT Blood Glucose.: 81 mg/dL (08-25-22 @ 07:47)  POCT Blood Glucose.: 100 mg/dL (08-24-22 @ 22:03)  POCT Blood Glucose.: 131 mg/dL (08-24-22 @ 16:37)  POCT Blood Glucose.: 122 mg/dL (08-24-22 @ 11:39)

## 2022-08-25 NOTE — PROGRESS NOTE ADULT - ASSESSMENT
57 yr old M w/a PMH of Type 1 DM presents with sepsis likely secondary to septic joint.     #Sepsis. due to left Tigh/Knee/Calf  Cellulitis Stap Bacteremia   Patient meeting sepsis criteria, fever and elevated WBC count.  likely Source is left knee infection/ cellulitis /? Septic joint.  Blood cx (+) Gram positive cocci cluster  Consulted Ortho   No immediate orthopedic surgical intervention necessary at this time  Chest xray lungs are clear  UA pending .  no urinary symptoms   COVID negative.    Likely source is left knee s/p arthrocentesis, cell count however inconsistent.    F/U cultures.  WBC    S/p Tap  Joint fluid , RBC <1000, Neutrophil 97%. Negative for crystal   Continue broad spectrum abx   follow  culture results  MRI of left knee non contrast as above  started on Vanco and Zosyn renally dosed. now on CEfazolin   Consulted  ID   Consulted  Rheumatology ordering Rheumatology Work up.   repeat blood cx  08/25 Spiking fever. I will order US left knee/tigh/Calf  are to rule fluid formation collection       #ESTUARDO (acute kidney injury).    baseline creatinine appears to be 1.3   Currently patient has ESTUARDO, likely from sepsis.    Patient urinating now.    Continue IV fluids 75cc/hr.    #Mild  Rhabdomyolysis   elevated CPK     # DM (diabetes mellitus), type 1.    Hgba1c of 6.4   Endocrine consult placed.    28 units of Lantus in AM and 4 units premeals  Moderate sliding scale.    # Hyponatremia \  improving likely 2/2 dehydration       # HTN  start Amlodipine       DVT Prophylaxis c/w Heparin            57 yr old M w/a PMH of Type 1 DM presents with sepsis likely secondary to septic joint.     #Sepsis. due to left Tigh/Knee/Calf  Cellulitis Stap Bacteremia   Patient meeting sepsis criteria, fever and elevated WBC count.  likely Source is left knee infection/ cellulitis /? Septic joint.  Blood cx (+) Gram positive cocci cluster  Consulted Ortho   No immediate orthopedic surgical intervention necessary at this time  Chest xray lungs are clear  UA pending .  no urinary symptoms   COVID negative.    Likely source is left knee s/p arthrocentesis, cell count however inconsistent.    F/U cultures.  WBC    S/p Tap  Joint fluid , RBC <1000, Neutrophil 97%. Negative for crystal   Continue broad spectrum abx   follow  culture results  MRI of left knee non contrast as above  started on Vanco and Zosyn renally dosed. now on CEfazolin   Consulted  ID   Consulted  Rheumatology ordering Rheumatology Work up. ordered   repeat blood cx  08/25 Spiking fever. I will order US left knee/tigh/Calf  are to rule fluid formation collection       #ESTUARDO (acute kidney injury).    baseline creatinine appears to be 1.3   Currently patient has ESTUARDO, likely from sepsis.    Patient urinating now.    Continue IV fluids 75cc/hr.    #Mild  Rhabdomyolysis   elevated CPK     # DM (diabetes mellitus), type 1.    Hgba1c of 6.4   Endocrine consult placed.    28 units of Lantus in AM and 4 units premeals  Moderate sliding scale.    # Hyponatremia improving   Na127->136  improving likely 2/2 dehydration       # HTN  start Amlodipine       DVT Prophylaxis c/w Heparin

## 2022-08-25 NOTE — CONSULT NOTE ADULT - CONSULT REQUESTED DATE/TIME
23-Aug-2022 15:11
22-Aug-2022 03:48
22-Aug-2022 14:30
23-Aug-2022 16:32
23-Aug-2022 10:50
25-Aug-2022 19:00

## 2022-08-25 NOTE — CONSULT NOTE ADULT - SUBJECTIVE AND OBJECTIVE BOX
ACUTE CARE SURGERY CONSULT    ACS consulted for concern of infectious collection of LLE. HPI as below. Reports worsening swelling of LLE and soreness around knee. Denies any trauma or bites / scratches of LLE. Negative for DVT on duplex. Sensation intact, pulse palpable. ROM limited by tightness / soreness. Compartment soft. No other complaints.     HPI:  57 yr old M w/a PMH of type 1 DM on [facility] presents with left knee pain.  Patient states that he woke up on Saturday with significant left knee pain, throughout the day the pain got worse with increased swelling and redness.  He reports difficulty ambulating on the knee.  Yesterday he began having fevers and chills.  He reports never experiencing something like this before.  Denies any trauma to the area.   (22 Aug 2022 11:19)      PAST MEDICAL HISTORY:  Type 1 diabetes        PAST SURGICAL HISTORY:  S/P appendectomy        ALLERGIES:  No Known Allergies      FAMILY HISTORY: Noncontributory    SOCIAL HISTORY: Denies tobacco, EtOH, illicit substance use.     HOME MEDICATIONS:    MEDICATIONS  (STANDING):  acetaminophen   IVPB .. 1000 milliGRAM(s) IV Intermittent once  amLODIPine   Tablet 5 milliGRAM(s) Oral daily  cadexomer iodine 0.9% Gel 1 Application(s) Topical daily  ceFAZolin   IVPB 2000 milliGRAM(s) IV Intermittent every 12 hours  dextrose 5%. 1000 milliLiter(s) (100 mL/Hr) IV Continuous <Continuous>  dextrose 5%. 1000 milliLiter(s) (50 mL/Hr) IV Continuous <Continuous>  dextrose 50% Injectable 25 Gram(s) IV Push once  dextrose 50% Injectable 12.5 Gram(s) IV Push once  dextrose 50% Injectable 25 Gram(s) IV Push once  glucagon  Injectable 1 milliGRAM(s) IntraMuscular once  heparin   Injectable 5000 Unit(s) SubCutaneous every 8 hours  insulin glargine Injectable (LANTUS) 22 Unit(s) SubCutaneous every morning  insulin lispro (ADMELOG) corrective regimen sliding scale   SubCutaneous three times a day before meals  insulin lispro Injectable (ADMELOG) 4 Unit(s) SubCutaneous three times a day before meals  naloxone Injectable 0.4 milliGRAM(s) IV Push once  polyethylene glycol 3350 17 Gram(s) Oral daily  senna 2 Tablet(s) Oral at bedtime  sodium chloride 0.9%. 1000 milliLiter(s) (75 mL/Hr) IV Continuous <Continuous>    MEDICATIONS  (PRN):  acetaminophen     Tablet .. 650 milliGRAM(s) Oral every 6 hours PRN Temp greater or equal to 38C (100.4F), Mild Pain (1 - 3)  bisacodyl 5 milliGRAM(s) Oral daily PRN Constipation  dextrose Oral Gel 15 Gram(s) Oral once PRN Blood Glucose LESS THAN 70 milliGRAM(s)/deciliter  morphine  - Injectable 2 milliGRAM(s) IV Push every 4 hours PRN Moderate Pain (4 - 6)      VITALS & I/Os:  Vital Signs Last 24 Hrs  T(C): 36.7 (25 Aug 2022 16:29), Max: 38.7 (24 Aug 2022 23:50)  T(F): 98.1 (25 Aug 2022 16:29), Max: 101.6 (24 Aug 2022 23:50)  HR: 88 (25 Aug 2022 16:29) (88 - 100)  BP: 137/52 (25 Aug 2022 16:29) (137/52 - 166/79)  BP(mean): --  RR: 19 (25 Aug 2022 16:29) (18 - 19)  SpO2: 94% (25 Aug 2022 16:29) (93% - 94%)    Parameters below as of 25 Aug 2022 16:29  Patient On (Oxygen Delivery Method): room air      CAPILLARY BLOOD GLUCOSE      POCT Blood Glucose.: 106 mg/dL (25 Aug 2022 16:46)  POCT Blood Glucose.: 93 mg/dL (25 Aug 2022 11:17)  POCT Blood Glucose.: 81 mg/dL (25 Aug 2022 07:47)  POCT Blood Glucose.: 100 mg/dL (24 Aug 2022 22:03)      I&O's Summary    24 Aug 2022 07:01  -  25 Aug 2022 07:00  --------------------------------------------------------  IN: 1860 mL / OUT: 0 mL / NET: 1860 mL        GENERAL: Alert, laying in bed in no acute distress.  MENTAL STATUS: AAOx3. Appropriate affect.  HEENT: EOMI. Trachea midline. full ROM  RESPIRATORY: Unlabored, no conversational dyspnea  CARDIOVASCULAR: RRR.    GASTROINTESTINAL: Abdomen soft, NT, ND, -R/-G.    NEUROLOGIC: Cranial nerves II-XII grossly intact. No focal neurological deficits. Moves all extremities spontaneously.  INTEGUMENTARY: No overt rashes or lesions, petechia or purpura. Good turgor. No edema.  MUSCULOSKELETAL: LLE with swelling. No distinct areas of erythema or tenderness, soreness and edema at knee. ROM limited by soreness / tightness. DP strongly palpable.         LABS:                        7.7    11.46 )-----------( 195      ( 25 Aug 2022 07:00 )             23.7     08-25    136  |  103  |  37.5<H>  ----------------------------<  75  4.3   |  21.0<L>  |  2.59<H>    Ca    8.6      25 Aug 2022 07:00    TPro  5.7<L>  /  Alb  2.6<L>  /  TBili  0.6  /  DBili  x   /  AST  45<H>  /  ALT  36  /  AlkPhos  200<H>  08-25    Lactate: acetaminophen     Tablet .. 650 milliGRAM(s) Oral every 6 hours PRN  acetaminophen   IVPB .. 1000 milliGRAM(s) IV Intermittent once  amLODIPine   Tablet 5 milliGRAM(s) Oral daily  bisacodyl 5 milliGRAM(s) Oral daily PRN  cadexomer iodine 0.9% Gel 1 Application(s) Topical daily  ceFAZolin   IVPB 2000 milliGRAM(s) IV Intermittent every 12 hours  dextrose 5%. 1000 milliLiter(s) IV Continuous <Continuous>  dextrose 5%. 1000 milliLiter(s) IV Continuous <Continuous>  dextrose 50% Injectable 25 Gram(s) IV Push once  dextrose 50% Injectable 12.5 Gram(s) IV Push once  dextrose 50% Injectable 25 Gram(s) IV Push once  dextrose Oral Gel 15 Gram(s) Oral once PRN  glucagon  Injectable 1 milliGRAM(s) IntraMuscular once  heparin   Injectable 5000 Unit(s) SubCutaneous every 8 hours  insulin glargine Injectable (LANTUS) 22 Unit(s) SubCutaneous every morning  insulin lispro (ADMELOG) corrective regimen sliding scale   SubCutaneous three times a day before meals  insulin lispro Injectable (ADMELOG) 4 Unit(s) SubCutaneous three times a day before meals  morphine  - Injectable 2 milliGRAM(s) IV Push every 4 hours PRN  naloxone Injectable 0.4 milliGRAM(s) IV Push once  polyethylene glycol 3350 17 Gram(s) Oral daily  senna 2 Tablet(s) Oral at bedtime  sodium chloride 0.9%. 1000 milliLiter(s) IV Continuous <Continuous>         IMAGING:  CT L Knee  IMPRESSION:  1. Moderate size joint effusion is again seen with surrounding subcutaneous edema.  2. No erosions are appreciated.  3. Consider arthrocentesis as warranted to assess for a septic joint.    MARISELA CESAR MD; Attending Radiologist  This document has been electronically signed. Aug 25 2022 8:11PM      LLE Ultrasound  Impression:  Trace to small left popliteal fossa cyst. Small to mild complex suprapatellar effusion.    FRANCO RICKETTS MD; Attending Radiologist  This document has been electronically signed. Aug 25 2022 3:54PM

## 2022-08-25 NOTE — PROGRESS NOTE ADULT - SUBJECTIVE AND OBJECTIVE BOX
[facility] Division of Hospital Medicine    Chief Complaint:      SUBJECTIVE / OVERNIGHT EVENTS:  Patient seen and examined at bedside   Spiking fever   Patient c/o chills  c/o lower back pain   left knee pain is controlled     PHYSICAL EXAM:  Vital Signs Last 24 Hrs  T(C): 37.2 (24 Aug 2022 15:57), Max: 38.2 (24 Aug 2022 06:00)  T(F): 99 (24 Aug 2022 15:57), Max: 100.8 (24 Aug 2022 06:00)  HR: 90 (24 Aug 2022 15:57) (88 - 105)  BP: 158/82 (24 Aug 2022 15:57) (133/76 - 186/81)  BP(mean): --  RR: 18 (24 Aug 2022 15:57) (18 - 20)  SpO2: 94% (24 Aug 2022 15:57) (93% - 95%)  Parameters below as of 24 Aug 2022 15:57  Patient On (Oxygen Delivery Method): room air  CONSTITUTIONAL: NAD, well-developed, well-groomed  ENMT: Moist oral mucosa, no pharyngeal injection or exudates; normal dentition  RESPIRATORY: Normal respiratory effort; lungs are clear to auscultation bilaterally  CARDIOVASCULAR: Regular rate and rhythm, normal S1 and S2, no murmur/rub/gallop; No lower extremity edema; Peripheral pulses are 2+ bilaterally  ABDOMEN: Nontender to palpation, normoactive bowel sounds, no rebound/guarding; No hepatosplenomegaly  MUSCLOSKELETAL:  Normal gait; no clubbing or cyanosis of digits; no joint swelling or tenderness to palpation  PSYCH: A+O to person, place, and time; affect appropriate  NEUROLOGY: CN 2-12 are intact and symmetric; no gross sensory deficits;   SKIN: No rashes; no palpable lesions                          8.5    12.84 )-----------( 201      ( 24 Aug 2022 06:15 )             25.1     08-24    136  |  101  |  41.9<H>  ----------------------------<  108<H>  4.4   |  19.0<L>  |  2.81<H>    Ca    8.7      24 Aug 2022 06:15    TPro  6.1<L>  /  Alb  2.6<L>  /  TBili  0.7  /  DBili  x   /  AST  50<H>  /  ALT  39  /  AlkPhos  171<H>  08-24      CARDIAC MARKERS ( 23 Aug 2022 09:15 )  x     / x     / 574 U/L / x     / 3.1 ng/mL  CARDIAC MARKERS ( 23 Aug 2022 02:19 )  x     / x     / 563 U/L / x     / 3.1 ng/mL          Culture - Joint Fluid (collected 22 Aug 2022 02:15)  Source: Knee Left Knee Synovial Fluid  Gram Stain (22 Aug 2022 11:47):    Moderate polymorphonuclear leukocytes per low power field    No organisms seen per oil power field  Preliminary Report (23 Aug 2022 09:27):    No growth    Culture - Blood (collected 21 Aug 2022 21:30)  Source: .Blood Blood-Peripheral  Gram Stain (24 Aug 2022 09:30):    Growth in anaerobic bottle: Gram Positive Cocci in Clusters  Preliminary Report (24 Aug 2022 09:31):    Growth in anaerobic bottle: Gram Positive Cocci in Clusters    ***Blood Panel PCR results on this specimen are available    approximately 3 hours after the Gram stain result.***    Gram stain, PCR, and/or culture results may not always    correspond due todifference in methodologies.    ************************************************************    This PCR assay was performed by multiplex PCR. This    Assay tests for 66 bacterial and resistance gene targets.    Please refer to the [facility] testdirectory    at [email or web address] for details.  Organism: Blood Culture PCR (24 Aug 2022 12:01)  Organism: Blood Culture PCR (24 Aug 2022 12:01)    Culture - Blood (collected 21 Aug 2022 21:20)  Source: .Blood Blood-Peripheral  Preliminary Report (23 Aug 2022 07:01):    No growth to date.      CAPILLARY BLOOD GLUCOSE      POCT Blood Glucose.: 122 mg/dL (24 Aug 2022 11:39)  POCT Blood Glucose.: 121 mg/dL (24 Aug 2022 07:56)  POCT Blood Glucose.: 114 mg/dL (23 Aug 2022 16:29)      MEDICATIONS  (STANDING):  amLODIPine   Tablet 5 milliGRAM(s) Oral daily  cadexomer iodine 0.9% Gel 1 Application(s) Topical daily  ceFAZolin   IVPB 2000 milliGRAM(s) IV Intermittent every 12 hours  dextrose 5%. 1000 milliLiter(s) (100 mL/Hr) IV Continuous <Continuous>  dextrose 5%. 1000 milliLiter(s) (50 mL/Hr) IV Continuous <Continuous>  dextrose 50% Injectable 25 Gram(s) IV Push once  dextrose 50% Injectable 12.5 Gram(s) IV Push once  dextrose 50% Injectable 25 Gram(s) IV Push once  glucagon  Injectable 1 milliGRAM(s) IntraMuscular once  heparin   Injectable 5000 Unit(s) SubCutaneous every 8 hours  insulin glargine Injectable (LANTUS) 28 Unit(s) SubCutaneous every morning  insulin lispro (ADMELOG) corrective regimen sliding scale   SubCutaneous three times a day before meals  insulin lispro Injectable (ADMELOG) 4 Unit(s) SubCutaneous three times a day before meals  naloxone Injectable 0.4 milliGRAM(s) IV Push once  polyethylene glycol 3350 17 Gram(s) Oral daily  senna 2 Tablet(s) Oral at bedtime  sodium chloride 0.9%. 1000 milliLiter(s) (75 mL/Hr) IV Continuous <Continuous>    MEDICATIONS  (PRN):  acetaminophen     Tablet .. 650 milliGRAM(s) Oral every 6 hours PRN Temp greater or equal to 38C (100.4F), Mild Pain (1 - 3)  bisacodyl 5 milliGRAM(s) Oral daily PRN Constipation  dextrose Oral Gel 15 Gram(s) Oral once PRN Blood Glucose LESS THAN 70 milliGRAM(s)/deciliter  morphine  - Injectable 2 milliGRAM(s) IV Push every 4 hours PRN Moderate Pain (4 - 6)      I&O's Summary    24 Aug 2022 07:01  -  24 Aug 2022 16:20  --------------------------------------------------------  IN: 960 mL / OUT: 0 mL / NET: 960 mL      MRI left Knee   IMPRESSION: Evaluation limited by motion artifact.    1.  Large effusion and soft tissue edema about the knee. If there is   concern for septic arthritis/osteomyelitis, aspiration should be   performed.    2.  Prominent muscle edema involving the semimembranosus and   gastrocnemius muscles, may be reactive, infectious, or could represent   muscle strain.    3.  Mild free margin blunting of the medial meniscus posterior horn. No   acute ligamentous injury.    4.  Tiny focus of likely degenerative bone marrow edema at the quadriceps   tendon insertion at the superior pole of the patella. Otherwise, no   fracture or bone marrow edema.  No focal cartilage loss.                                             [facility] Division of Hospital Medicine    Chief Complaint:      SUBJECTIVE / OVERNIGHT EVENTS:  Patient seen and examined at bedside   Spiking fever   Patient c/o chills  c/o lower back pain   left knee pain is controlled     PHYSICAL EXAM:  Vital Signs Last 24 Hrs  T(C): 37.7 (25 Aug 2022 04:47), Max: 38.7 (24 Aug 2022 23:50)  T(F): 99.9 (25 Aug 2022 04:47), Max: 101.6 (24 Aug 2022 23:50)  HR: 95 (25 Aug 2022 04:47) (90 - 100)  BP: 155/77 (25 Aug 2022 04:47) (155/77 - 166/79)  BP(mean): --  RR: 19 (25 Aug 2022 04:47) (18 - 19)  SpO2: 93% (25 Aug 2022 04:47) (93% - 94%)  Parameters below as of 24 Aug 2022 21:30  Patient On (Oxygen Delivery Method): room air  CONSTITUTIONAL: NAD, w  ENMT: Moist oral mucosa, no pharyngeal injection or exudates; normal dentition  RESPIRATORY: Normal respiratory effort; lungs are clear to auscultation bilaterally  CARDIOVASCULAR: Regular rate and rhythm, normal S1 and S2, no murmur/rub/gallop; No lower extremity edema; Peripheral pulses are 2+ bilaterally  ABDOMEN: Nontender to palpation, normoactive bowel sounds, no rebound/guarding; No hepatosplenomegaly  MUSCLOSKELETAL:  Normal gait; no clubbing or cyanosis of digits; no joint swelling or tenderness to palpation.  Left knee  medial aspect erythema, redness and swelling  PSYCH: A+O to person, place, and time; affect appropriate  NEUROLOGY: CN 2-12 are intact and symmetric; no gross sensory deficits;   SKIN: No rashes; no palpable lesions                                 7.7    11.46 )-----------( 195      ( 25 Aug 2022 07:00 )             23.7     08-25    136  |  103  |  37.5<H>  ----------------------------<  75  4.3   |  21.0<L>  |  2.59<H>    Ca    8.6      25 Aug 2022 07:00    TPro  5.7<L>  /  Alb  2.6<L>  /  TBili  0.6  /  DBili  x   /  AST  45<H>  /  ALT  36  /  AlkPhos  200<H>  08-25              CAPILLARY BLOOD GLUCOSE      POCT Blood Glucose.: 81 mg/dL (25 Aug 2022 07:47)  POCT Blood Glucose.: 100 mg/dL (24 Aug 2022 22:03)  POCT Blood Glucose.: 131 mg/dL (24 Aug 2022 16:37)  POCT Blood Glucose.: 122 mg/dL (24 Aug 2022 11:39)      MEDICATIONS  (STANDING):  amLODIPine   Tablet 5 milliGRAM(s) Oral daily  cadexomer iodine 0.9% Gel 1 Application(s) Topical daily  ceFAZolin   IVPB 2000 milliGRAM(s) IV Intermittent every 12 hours  dextrose 5%. 1000 milliLiter(s) (100 mL/Hr) IV Continuous <Continuous>  dextrose 5%. 1000 milliLiter(s) (50 mL/Hr) IV Continuous <Continuous>  dextrose 50% Injectable 25 Gram(s) IV Push once  dextrose 50% Injectable 12.5 Gram(s) IV Push once  dextrose 50% Injectable 25 Gram(s) IV Push once  glucagon  Injectable 1 milliGRAM(s) IntraMuscular once  heparin   Injectable 5000 Unit(s) SubCutaneous every 8 hours  insulin glargine Injectable (LANTUS) 22 Unit(s) SubCutaneous every morning  insulin lispro (ADMELOG) corrective regimen sliding scale   SubCutaneous three times a day before meals  insulin lispro Injectable (ADMELOG) 4 Unit(s) SubCutaneous three times a day before meals  naloxone Injectable 0.4 milliGRAM(s) IV Push once  polyethylene glycol 3350 17 Gram(s) Oral daily  senna 2 Tablet(s) Oral at bedtime  sodium chloride 0.9%. 1000 milliLiter(s) (75 mL/Hr) IV Continuous <Continuous>    MEDICATIONS  (PRN):  acetaminophen     Tablet .. 650 milliGRAM(s) Oral every 6 hours PRN Temp greater or equal to 38C (100.4F), Mild Pain (1 - 3)  bisacodyl 5 milliGRAM(s) Oral daily PRN Constipation  dextrose Oral Gel 15 Gram(s) Oral once PRN Blood Glucose LESS THAN 70 milliGRAM(s)/deciliter  morphine  - Injectable 2 milliGRAM(s) IV Push every 4 hours PRN Moderate Pain (4 - 6)      I&O's Summary    24 Aug 2022 07:01  -  25 Aug 2022 07:00  --------------------------------------------------------  IN: 1860 mL / OUT: 0 mL / NET: 1860 mL          MRI left Knee   IMPRESSION: Evaluation limited by motion artifact.    1.  Large effusion and soft tissue edema about the knee. If there is   concern for septic arthritis/osteomyelitis, aspiration should be   performed.    2.  Prominent muscle edema involving the semimembranosus and   gastrocnemius muscles, may be reactive, infectious, or could represent   muscle strain.    3.  Mild free margin blunting of the medial meniscus posterior horn. No   acute ligamentous injury.    4.  Tiny focus of likely degenerative bone marrow edema at the quadriceps   tendon insertion at the superior pole of the patella. Otherwise, no   fracture or bone marrow edema.  No focal cartilage loss.

## 2022-08-25 NOTE — CONSULT NOTE ADULT - ASSESSMENT
57 yr old M w/ PMH of type 1 DM on [facility] presents with left knee pain and swelling, worsened during hospitalization. ACS consulted for concern of infectious collection. None identified on examination and CT without finding of infectious appearing collection. Notable for joint effusion.     - There is no identifiable infectious collection of LLE  - no urgent indication for ACS intervention  - recommend ortho consult for joint effusion    Plan discussed with Dr. Mancini who agrees. 
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 Derm consult=seems to be recurring issues with pt 
57 yr old M w/a PMH of type 1 DM on [facility] presents with left knee pain.  Patient states that he woke up on Saturday with significant left knee pain, throughout the day the pain got worse with increased swelling and redness.  He reports difficulty ambulating on the knee.  Yesterday he began having fevers and chills.  He reports never experiencing something like this before.  Denies any trauma to the area.     Fever  Leukocytosis  Left knee and leg swelling  ESTUARDO/CKD    - f/u BCX  - left knee arthrocentesis does not appear consistent with septic arthritis, f/u CX  - f/u BCX  - send tick panel  - Continue empiric zosyn and vancomycin  - trend ESR , CRP  - will monitor clinically  - monitor trough and adjust for renal function  - rheum eval  - Trend Fever  - Trend Leukocytosis    d/w attending, pt  Will Follow

## 2022-08-26 LAB
-  AMPICILLIN/SULBACTAM: SIGNIFICANT CHANGE UP
-  CEFAZOLIN: SIGNIFICANT CHANGE UP
-  CLINDAMYCIN: SIGNIFICANT CHANGE UP
-  ERYTHROMYCIN: SIGNIFICANT CHANGE UP
-  GENTAMICIN: SIGNIFICANT CHANGE UP
-  OXACILLIN: SIGNIFICANT CHANGE UP
-  RIFAMPIN: SIGNIFICANT CHANGE UP
-  TETRACYCLINE: SIGNIFICANT CHANGE UP
-  TRIMETHOPRIM/SULFAMETHOXAZOLE: SIGNIFICANT CHANGE UP
-  VANCOMYCIN: SIGNIFICANT CHANGE UP
A PHAGOCYTOPH IGG TITR SER IF: SIGNIFICANT CHANGE UP TITER
ALBUMIN SERPL ELPH-MCNC: 2.5 G/DL — LOW (ref 3.3–5.2)
ALP SERPL-CCNC: 213 U/L — HIGH (ref 40–120)
ALT FLD-CCNC: 28 U/L — SIGNIFICANT CHANGE UP
ANION GAP SERPL CALC-SCNC: 12 MMOL/L — SIGNIFICANT CHANGE UP (ref 5–17)
ANTI-RIBONUCLEAR PROTEIN: <0.2 AI — SIGNIFICANT CHANGE UP
AST SERPL-CCNC: 47 U/L — HIGH
B BURGDOR AB SER QL IA: NEGATIVE — SIGNIFICANT CHANGE UP
B MICROTI IGG TITR SER: SIGNIFICANT CHANGE UP TITER
BASOPHILS # BLD AUTO: 0.02 K/UL — SIGNIFICANT CHANGE UP (ref 0–0.2)
BASOPHILS NFR BLD AUTO: 0.2 % — SIGNIFICANT CHANGE UP (ref 0–2)
BILIRUB SERPL-MCNC: 0.7 MG/DL — SIGNIFICANT CHANGE UP (ref 0.4–2)
BUN SERPL-MCNC: 32.9 MG/DL — HIGH (ref 8–20)
CALCIUM SERPL-MCNC: 9 MG/DL — SIGNIFICANT CHANGE UP (ref 8.4–10.5)
CHLORIDE SERPL-SCNC: 99 MMOL/L — SIGNIFICANT CHANGE UP (ref 98–107)
CO2 SERPL-SCNC: 22 MMOL/L — SIGNIFICANT CHANGE UP (ref 22–29)
CREAT SERPL-MCNC: 2.39 MG/DL — HIGH (ref 0.5–1.3)
CRP SERPL-MCNC: 175 MG/L — HIGH
CULTURE RESULTS: SIGNIFICANT CHANGE UP
E CHAFFEENSIS IGG TITR SER IF: SIGNIFICANT CHANGE UP TITER
EGFR: 31 ML/MIN/1.73M2 — LOW
ENA JO1 AB SER-ACNC: <0.2 AI — SIGNIFICANT CHANGE UP
ENA SCL70 AB SER-ACNC: <0.2 AI — SIGNIFICANT CHANGE UP
ENA SM AB FLD QL: <0.2 AI — SIGNIFICANT CHANGE UP
EOSINOPHIL # BLD AUTO: 0.2 K/UL — SIGNIFICANT CHANGE UP (ref 0–0.5)
EOSINOPHIL NFR BLD AUTO: 1.9 % — SIGNIFICANT CHANGE UP (ref 0–6)
GLUCOSE BLDC GLUCOMTR-MCNC: 151 MG/DL — HIGH (ref 70–99)
GLUCOSE BLDC GLUCOMTR-MCNC: 151 MG/DL — HIGH (ref 70–99)
GLUCOSE BLDC GLUCOMTR-MCNC: 158 MG/DL — HIGH (ref 70–99)
GLUCOSE BLDC GLUCOMTR-MCNC: 181 MG/DL — HIGH (ref 70–99)
GLUCOSE BLDC GLUCOMTR-MCNC: 312 MG/DL — HIGH (ref 70–99)
GLUCOSE BLDC GLUCOMTR-MCNC: 84 MG/DL — SIGNIFICANT CHANGE UP (ref 70–99)
GLUCOSE SERPL-MCNC: 81 MG/DL — SIGNIFICANT CHANGE UP (ref 70–99)
GRAM STN FLD: SIGNIFICANT CHANGE UP
HBV CORE IGM SER-ACNC: SIGNIFICANT CHANGE UP
HBV SURFACE AB SER-ACNC: SIGNIFICANT CHANGE UP
HBV SURFACE AG SER-ACNC: SIGNIFICANT CHANGE UP
HCT VFR BLD CALC: 22.4 % — LOW (ref 39–50)
HCV AB S/CO SERPL IA: 0.05 S/CO — SIGNIFICANT CHANGE UP (ref 0–0.99)
HCV AB SERPL-IMP: SIGNIFICANT CHANGE UP
HGB BLD-MCNC: 7.4 G/DL — LOW (ref 13–17)
IMM GRANULOCYTES NFR BLD AUTO: 0.8 % — SIGNIFICANT CHANGE UP (ref 0–1.5)
LYMPHOCYTES # BLD AUTO: 0.66 K/UL — LOW (ref 1–3.3)
LYMPHOCYTES # BLD AUTO: 6.2 % — LOW (ref 13–44)
MCHC RBC-ENTMCNC: 29.5 PG — SIGNIFICANT CHANGE UP (ref 27–34)
MCHC RBC-ENTMCNC: 33 GM/DL — SIGNIFICANT CHANGE UP (ref 32–36)
MCV RBC AUTO: 89.2 FL — SIGNIFICANT CHANGE UP (ref 80–100)
METHOD TYPE: SIGNIFICANT CHANGE UP
MONOCYTES # BLD AUTO: 1.02 K/UL — HIGH (ref 0–0.9)
MONOCYTES NFR BLD AUTO: 9.6 % — SIGNIFICANT CHANGE UP (ref 2–14)
NEUTROPHILS # BLD AUTO: 8.68 K/UL — HIGH (ref 1.8–7.4)
NEUTROPHILS NFR BLD AUTO: 81.3 % — HIGH (ref 43–77)
ORGANISM # SPEC MICROSCOPIC CNT: SIGNIFICANT CHANGE UP
PLATELET # BLD AUTO: 204 K/UL — SIGNIFICANT CHANGE UP (ref 150–400)
POTASSIUM SERPL-MCNC: 4.3 MMOL/L — SIGNIFICANT CHANGE UP (ref 3.5–5.3)
POTASSIUM SERPL-SCNC: 4.3 MMOL/L — SIGNIFICANT CHANGE UP (ref 3.5–5.3)
PROCALCITONIN SERPL-MCNC: 0.75 NG/ML — HIGH (ref 0.02–0.1)
PROCALCITONIN SERPL-MCNC: 0.76 NG/ML — HIGH (ref 0.02–0.1)
PROT SERPL-MCNC: 5.6 G/DL — LOW (ref 6.6–8.7)
RAPID RVP RESULT: SIGNIFICANT CHANGE UP
RBC # BLD: 2.51 M/UL — LOW (ref 4.2–5.8)
RBC # FLD: 12.5 % — SIGNIFICANT CHANGE UP (ref 10.3–14.5)
SARS-COV-2 RNA SPEC QL NAA+PROBE: SIGNIFICANT CHANGE UP
SODIUM SERPL-SCNC: 133 MMOL/L — LOW (ref 135–145)
SPECIMEN SOURCE: SIGNIFICANT CHANGE UP
WBC # BLD: 10.66 K/UL — HIGH (ref 3.8–10.5)
WBC # FLD AUTO: 10.66 K/UL — HIGH (ref 3.8–10.5)

## 2022-08-26 PROCEDURE — 93306 TTE W/DOPPLER COMPLETE: CPT | Mod: 26

## 2022-08-26 PROCEDURE — 99232 SBSQ HOSP IP/OBS MODERATE 35: CPT

## 2022-08-26 PROCEDURE — 99233 SBSQ HOSP IP/OBS HIGH 50: CPT

## 2022-08-26 RX ORDER — ACETAMINOPHEN 500 MG
1000 TABLET ORAL ONCE
Refills: 0 | Status: COMPLETED | OUTPATIENT
Start: 2022-08-26 | End: 2022-08-26

## 2022-08-26 RX ORDER — OXYCODONE AND ACETAMINOPHEN 5; 325 MG/1; MG/1
1 TABLET ORAL EVERY 4 HOURS
Refills: 0 | Status: DISCONTINUED | OUTPATIENT
Start: 2022-08-26 | End: 2022-08-30

## 2022-08-26 RX ORDER — INSULIN GLARGINE 100 [IU]/ML
18 INJECTION, SOLUTION SUBCUTANEOUS EVERY MORNING
Refills: 0 | Status: DISCONTINUED | OUTPATIENT
Start: 2022-08-26 | End: 2022-08-30

## 2022-08-26 RX ORDER — HYDROMORPHONE HYDROCHLORIDE 2 MG/ML
1 INJECTION INTRAMUSCULAR; INTRAVENOUS; SUBCUTANEOUS ONCE
Refills: 0 | Status: DISCONTINUED | OUTPATIENT
Start: 2022-08-26 | End: 2022-08-26

## 2022-08-26 RX ORDER — HYDROMORPHONE HYDROCHLORIDE 2 MG/ML
2 INJECTION INTRAMUSCULAR; INTRAVENOUS; SUBCUTANEOUS ONCE
Refills: 0 | Status: DISCONTINUED | OUTPATIENT
Start: 2022-08-26 | End: 2022-08-26

## 2022-08-26 RX ORDER — OXYCODONE AND ACETAMINOPHEN 5; 325 MG/1; MG/1
2 TABLET ORAL EVERY 4 HOURS
Refills: 0 | Status: DISCONTINUED | OUTPATIENT
Start: 2022-08-26 | End: 2022-08-26

## 2022-08-26 RX ADMIN — Medication 1: at 17:03

## 2022-08-26 RX ADMIN — Medication 1 APPLICATION(S): at 22:22

## 2022-08-26 RX ADMIN — AMLODIPINE BESYLATE 5 MILLIGRAM(S): 2.5 TABLET ORAL at 05:08

## 2022-08-26 RX ADMIN — HEPARIN SODIUM 5000 UNIT(S): 5000 INJECTION INTRAVENOUS; SUBCUTANEOUS at 22:13

## 2022-08-26 RX ADMIN — INSULIN GLARGINE 18 UNIT(S): 100 INJECTION, SOLUTION SUBCUTANEOUS at 13:34

## 2022-08-26 RX ADMIN — Medication 100 MILLIGRAM(S): at 20:23

## 2022-08-26 RX ADMIN — HEPARIN SODIUM 5000 UNIT(S): 5000 INJECTION INTRAVENOUS; SUBCUTANEOUS at 05:08

## 2022-08-26 RX ADMIN — Medication 100 MILLIGRAM(S): at 05:40

## 2022-08-26 RX ADMIN — Medication 400 MILLIGRAM(S): at 22:15

## 2022-08-26 RX ADMIN — Medication 650 MILLIGRAM(S): at 19:46

## 2022-08-26 RX ADMIN — HYDROMORPHONE HYDROCHLORIDE 1 MILLIGRAM(S): 2 INJECTION INTRAMUSCULAR; INTRAVENOUS; SUBCUTANEOUS at 15:39

## 2022-08-26 RX ADMIN — HYDROMORPHONE HYDROCHLORIDE 1 MILLIGRAM(S): 2 INJECTION INTRAMUSCULAR; INTRAVENOUS; SUBCUTANEOUS at 19:50

## 2022-08-26 RX ADMIN — Medication 1000 MILLIGRAM(S): at 06:56

## 2022-08-26 RX ADMIN — Medication 400 MILLIGRAM(S): at 05:11

## 2022-08-26 RX ADMIN — HEPARIN SODIUM 5000 UNIT(S): 5000 INJECTION INTRAVENOUS; SUBCUTANEOUS at 13:36

## 2022-08-26 RX ADMIN — SODIUM CHLORIDE 75 MILLILITER(S): 9 INJECTION INTRAMUSCULAR; INTRAVENOUS; SUBCUTANEOUS at 12:42

## 2022-08-26 RX ADMIN — Medication 1000 MILLIGRAM(S): at 23:15

## 2022-08-26 NOTE — PROGRESS NOTE ADULT - SUBJECTIVE AND OBJECTIVE BOX
CC: Follow up T1DM management     INTERVAL HPI/OVERNIGHT EVENTS:  Pt with hypoglycemia yesterday  FS trending low  Poor appetite    ROS: Patient denies chest pain, SOB. +left knee pain    MEDICATIONS  (STANDING):  amLODIPine   Tablet 5 milliGRAM(s) Oral daily  cadexomer iodine 0.9% Gel 1 Application(s) Topical daily  ceFAZolin   IVPB 2000 milliGRAM(s) IV Intermittent every 12 hours  dextrose 5%. 1000 milliLiter(s) (100 mL/Hr) IV Continuous <Continuous>  dextrose 5%. 1000 milliLiter(s) (50 mL/Hr) IV Continuous <Continuous>  dextrose 50% Injectable 25 Gram(s) IV Push once  dextrose 50% Injectable 12.5 Gram(s) IV Push once  dextrose 50% Injectable 25 Gram(s) IV Push once  glucagon  Injectable 1 milliGRAM(s) IntraMuscular once  heparin   Injectable 5000 Unit(s) SubCutaneous every 8 hours  insulin glargine Injectable (LANTUS) 18 Unit(s) SubCutaneous every morning  insulin lispro (ADMELOG) corrective regimen sliding scale   SubCutaneous three times a day before meals  naloxone Injectable 0.4 milliGRAM(s) IV Push once  polyethylene glycol 3350 17 Gram(s) Oral daily  senna 2 Tablet(s) Oral at bedtime  sodium chloride 0.9%. 1000 milliLiter(s) (75 mL/Hr) IV Continuous <Continuous>    MEDICATIONS  (PRN):  acetaminophen     Tablet .. 650 milliGRAM(s) Oral every 6 hours PRN Temp greater or equal to 38C (100.4F), Mild Pain (1 - 3)  bisacodyl 5 milliGRAM(s) Oral daily PRN Constipation  dextrose Oral Gel 15 Gram(s) Oral once PRN Blood Glucose LESS THAN 70 milliGRAM(s)/deciliter  morphine  - Injectable 2 milliGRAM(s) IV Push every 4 hours PRN Moderate Pain (4 - 6)    Allergies  No Known Allergies    Vital Signs Last 24 Hrs  T(C): 36.8 (26 Aug 2022 09:42), Max: 38.1 (26 Aug 2022 04:52)  T(F): 98.3 (26 Aug 2022 09:42), Max: 100.5 (26 Aug 2022 04:52)  HR: 80 (26 Aug 2022 09:42) (80 - 100)  BP: 144/75 (26 Aug 2022 09:42) (121/64 - 173/82)  BP(mean): --  RR: 18 (26 Aug 2022 09:42) (18 - 19)  SpO2: 93% (26 Aug 2022 09:42) (92% - 94%)    Parameters below as of 26 Aug 2022 09:42  Patient On (Oxygen Delivery Method): room air      PHYSICAL EXAM:  Constitutional: NAD, well-groomed, well-developed  HEENT: PERRLA, EOMI, no exophalmos  Neck: No LAD, No JVD, trachea midline, no thyroid enlargement  Back: Normal spine flexure, No CVA tenderness  Respiratory: CTAB  Cardiovascular: S1 and S2, RRR, no M/G/R  Gastrointestinal: BS+, soft, no organomeglag or mass  Extremities: No peripheral edema, no pedal lesions  Vascular: 2+ peripheral pulses  Neurological: A/O x 3, no focal deficits  Psychiatric: Normal mood, normal affect  Musculoskeletal: 5/5 strength b/l upper and lower extremities  Skin: No rashes, no acanthosis      LABS:                        7.4    10.66 )-----------( 204      ( 26 Aug 2022 07:40 )             22.4     08-26    133<L>  |  99  |  32.9<H>  ----------------------------<  81  4.3   |  22.0  |  2.39<H>    Ca    9.0      26 Aug 2022 07:40    TPro  5.6<L>  /  Alb  2.5<L>  /  TBili  0.7  /  DBili  x   /  AST  47<H>  /  ALT  28  /  AlkPhos  213<H>  08-26      POCT Blood Glucose.: 84 mg/dL (08-26-22 @ 07:41)  POCT Blood Glucose.: 116 mg/dL (08-25-22 @ 23:05)  POCT Blood Glucose.: 81 mg/dL (08-25-22 @ 22:00)  POCT Blood Glucose.: 57 mg/dL (08-25-22 @ 21:22)  POCT Blood Glucose.: 56 mg/dL (08-25-22 @ 21:11)  POCT Blood Glucose.: 106 mg/dL (08-25-22 @ 16:46)  POCT Blood Glucose.: 93 mg/dL (08-25-22 @ 11:17)

## 2022-08-26 NOTE — PROGRESS NOTE ADULT - ASSESSMENT
57 yr old M w/a PMH of Type 1 DM presents with sepsis likely secondary to septic joint.     #Sepsis. due to left Tigh/Knee/Calf  Cellulitis Stap Bacteremia   Patient meeting sepsis criteria, fever and elevated WBC count.  likely Source is left knee infection/ cellulitis /? Septic joint.  Blood cx (+) Gram positive cocci cluster  Consulted Ortho   No immediate orthopedic surgical intervention necessary at this time  Chest xray lungs are clear  UA pending .  no urinary symptoms   COVID negative.    Likely source is left knee s/p arthrocentesis, cell count however inconsistent.    F/U cultures.  WBC    S/p Tap  Joint fluid , RBC <1000, Neutrophil 97%. Negative for crystal   Continue broad spectrum abx   follow  culture results  MRI of left knee non contrast as above  started on Vanco and Zosyn renally dosed. now on Cefazolin   Consulted  ID   Consulted  Rheumatology ordering Rheumatology Work up. ordered   repeat blood cx  08/25 Spiking fever. I will order US left knee/tigh/Calf  are to rule fluid formation collection   08/26 Spiking fever, Echo pending , repeat   back pain worsening ordering MRI spine.    #ESTUARDO (acute kidney injury).    baseline creatinine appears to be 1.3   Currently patient has ESTUARDO, likely from sepsis.    Patient urinating now.    Continue IV fluids 75cc/hr.    #Mild  Rhabdomyolysis   elevated CPK     # DM (diabetes mellitus), type 1.    Hgba1c of 6.4   Endocrine consult placed.    decreased insulin lantus to 18 due to hypoglycemia   Moderate sliding scale.    # Hyponatremia improving   Na127->136  improving likely 2/2 dehydration       # HTN  on Amlodipine       DVT Prophylaxis c/w Heparin

## 2022-08-26 NOTE — PROGRESS NOTE ADULT - ASSESSMENT
57 yr old M w/a PMH of type 1 DM on Tresiba presents with left knee pain.  Patient states that he woke up on Saturday with significant left knee pain, throughout the day the pain got worse with increased swelling and redness.  He reports difficulty ambulating on the knee and fever and chills. MRI left knee showed large left effusion s/p arthrocentesis with low cell counts. Blood cultures now + for MSsa     MSSA Bacteremia  Fever  Leukocytosis  Left knee effusion  Left leg cellulitis  ESTUARDO/CKD  Sugar Diabetes       Plan:  MSSA bacteremia may be from skin source.   has chronic ulcers in the right foot. also right hand and left hand.     the left knee is inflamed., this may be the source.   repeat blood cultures from 8/25/22 x 2 in process       - consider TTE    - left knee arthrocentesis does not appear consistent with septic arthritis, f/u CX. ? reactive arthritis    - f/u tick panel    continue cefazolin adjusted for renal function  - trend ESR , CRP  - will monitor clinically. repeat CT if worsening LLE swelling    - follow up all outstanding cultures  - trend temperature and WBC curve  - repeat cultures from blood and all sources if febrile.      Diabetes  - continue endocrine support.

## 2022-08-26 NOTE — PROGRESS NOTE ADULT - SUBJECTIVE AND OBJECTIVE BOX
[facility]                                                INFECTIOUS DISEASES  =======================================================                               Rodger Marino MD#  Hugo Collado MD*                                     Ivon Romano MD*    Faviola Lopez MD*            Diplomates American Board of Internal Medicine & Infectious Diseases                  # [place] Office - Appt - Tel  [phone] Fax [phone]                * [place] Office - Appt - Tel [phone] Fax [phone]                                  Hospital Consult line:  [phone]  =======================================================    [id number]  HAILEY CARD   follow up for: left leg swelling, left knee effusion; MSSA infection    still with knee pain in the LEFT side.   no fevers    I have personally reviewed the labs and data; pertinent labs and data are listed in this note; please see below.   ===================================================  REVIEW OF SYSTEMS:  CONSTITUTIONAL:  No Fever or chills  HEENT:  No diplopia or blurred vision.  No earache, sore throat or runny nose.  CARDIOVASCULAR:  No pressure, squeezing, strangling, tightness, heaviness or aching about the chest, neck, axilla or epigastrium.  RESPIRATORY:  No cough, shortness of breath  GASTROINTESTINAL:  No nausea, vomiting or diarrhea.  GENITOURINARY:  No dysuria, frequency or urgency. No Blood in urine  MUSCULOSKELETAL:  no joint aches, no muscle pain  SKIN:   as per HPI  NEUROLOGIC:  No Headaches, seizures or weakness.  PSYCHIATRIC:  No disorder of thought or mood.  ENDOCRINE:  No heat or cold intolerance  HEMATOLOGICAL:  No easy bruising or bleeding.    =======================================================  Allergies  No Known Allergies       ======================================================  Physical Exam:  ============     General:  No acute distress.  Eye: Pupils are equal, round and reactive to light, Normal conjunctiva.  HENT: Normocephalic, Oral mucosa is moist, No pharyngeal erythema, No sinus tenderness.  Neck: Supple, No lymphadenopathy.  Respiratory: Lungs are clear to auscultation, Respirations are non-labored.  Cardiovascular: Normal rate, Regular rhythm,  s1+s2  Gastrointestinal: Soft, Non-tender, Non-distended, Normal bowel sounds.  Genitourinary: No costovertebral angle tenderness.  Lymphatics: No lymphadenopathy neck,   Musculoskeletal: left knee swelling tenderness; reduced ROM  Integumentary: LLE +pitting edema from lower thigh to calf +erythema over medial aspect of knee,   healed RIGHT plantar foot ulcer,  ulceration of rt thumb and left middle fingers  Neurologic: Alert, Oriented, No focal deficits  Psychiatric: Appropriate mood & affect.  =======================================================  Vitals:  ============  T(F): 98.3 (26 Aug 2022 09:42), Max: 100.5 (26 Aug 2022 04:52)  HR: 80 (26 Aug 2022 09:42)  BP: 144/75 (26 Aug 2022 09:42)  RR: 18 (26 Aug 2022 09:42)  SpO2: 93% (26 Aug 2022 09:42) (92% - 94%)  temp max in last 48H T(F): , Max: 101.6 (08-24-22 @ 23:50)    =======================================================  Current Antibiotics:  ceFAZolin   IVPB 2000 milliGRAM(s) IV Intermittent every 12 hours    Other medications:  amLODIPine   Tablet 5 milliGRAM(s) Oral daily  cadexomer iodine 0.9% Gel 1 Application(s) Topical daily  dextrose 5%. 1000 milliLiter(s) IV Continuous <Continuous>  dextrose 5%. 1000 milliLiter(s) IV Continuous <Continuous>  dextrose 50% Injectable 25 Gram(s) IV Push once  dextrose 50% Injectable 12.5 Gram(s) IV Push once  dextrose 50% Injectable 25 Gram(s) IV Push once  glucagon  Injectable 1 milliGRAM(s) IntraMuscular once  heparin   Injectable 5000 Unit(s) SubCutaneous every 8 hours  HYDROmorphone   Tablet 2 milliGRAM(s) Oral once  insulin glargine Injectable (LANTUS) 18 Unit(s) SubCutaneous every morning  insulin lispro (ADMELOG) corrective regimen sliding scale   SubCutaneous three times a day before meals  naloxone Injectable 0.4 milliGRAM(s) IV Push once  polyethylene glycol 3350 17 Gram(s) Oral daily  senna 2 Tablet(s) Oral at bedtime  sodium chloride 0.9%. 1000 milliLiter(s) IV Continuous <Continuous>      =======================================================  Labs:                        7.4    10.66 )-----------( 204      ( 26 Aug 2022 07:40 )             22.4     08-26    133<L>  |  99  |  32.9<H>  ----------------------------<  81  4.3   |  22.0  |  2.39<H>    Ca    9.0      26 Aug 2022 07:40    TPro  5.6<L>  /  Alb  2.5<L>  /  TBili  0.7  /  DBili  x   /  AST  47<H>  /  ALT  28  /  AlkPhos  213<H>  08-26      Culture - Joint Fluid (collected 08-22-22 @ 02:15)  Source: Knee Left Knee Synovial Fluid  Gram Stain (08-22-22 @ 11:47):    Moderate polymorphonuclear leukocytes per low power field    No organisms seen per oil power field    Culture - Blood (collected 08-21-22 @ 21:30)  Source: .Blood Blood-Peripheral  Gram Stain (08-24-22 @ 09:30):    Growth in anaerobic bottle: Gram Positive Cocci in Clusters  Final Report (08-26-22 @ 12:16):    Growth in anaerobic bottle: Staphylococcus aureus    ***Blood Panel PCR results on this specimen are available    approximately 3 hours after the Gram stain result.***    Gram stain, PCR, and/or culture results may not always    correspond due to difference in methodologies.    ************************************************************    This PCR assay was performed by multiplex PCR. This    Assay tests for 66 bacterial and resistance gene targets.    Please refer to the [facility] test directory    at [email or web address] for details.  Organism: Blood Culture PCR  Staphylococcus aureus (08-26-22 @ 12:16)  Organism: Staphylococcus aureus (08-26-22 @ 12:16)    Sensitivities:      -  Ampicillin/Sulbactam: S <=8/4      -  Cefazolin: S <=4      -  Clindamycin: S <=0.25      -  Erythromycin: S <=0.25      -  Gentamicin: S <=1 Should not be used as monotherapy      -  Oxacillin: S <=0.25 Oxacillin predicts susceptibility for dicloxacillin, methicillin, and nafcillin      -  Rifampin: S <=1 Should not be used as monotherapy      -  Tetra/Doxy: R >8      -  Trimethoprim/Sulfamethoxazole: S <=0.5/9.5      -  Vancomycin: S 1      Method Type: YUN  Organism: Blood Culture PCR (08-26-22 @ 12:16)    Sensitivities:      -  Methicillin SENSITIVE Staphylococcus aureus (MSSA): Detec Any isolate of Staphylococcus aureus from a blood culture is NOT considered a contaminant.      Method Type: PCR    Culture - Blood (collected 08-21-22 @ 21:20)  Source: .Blood Blood-Peripheral        C-Reactive Protein, Serum: 175 mg/L (08-26-22 @ 07:40)  C-Reactive Protein, Serum: 298 mg/L (08-24-22 @ 06:15)  C-Reactive Protein, Serum: 299 mg/L (08-23-22 @ 09:15)  C-Reactive Protein, Serum: 302 mg/L (08-23-22 @ 02:19)  C-Reactive Protein, Serum: 261 mg/L (08-21-22 @ 21:20)    Sedimentation Rate, Erythrocyte: 57 mm/hr (08-21-22 @ 21:20)    Procalcitonin, Serum: 0.76 ng/mL (08-26-22 @ 07:40)  Procalcitonin, Serum: 0.75 ng/mL (08-26-22 @ 07:40)  Procalcitonin, Serum: 2.05 ng/mL (08-24-22 @ 06:15)  Procalcitonin, Serum: 2.83 ng/mL (08-23-22 @ 09:15)    COVID-19 PCR: NotDetec (08-22-22 @ 08:10)      =======================================================     A1C with Estimated Average Glucose Result: 6.4 % (08-22-22 @ 10:40)

## 2022-08-26 NOTE — PROGRESS NOTE ADULT - SUBJECTIVE AND OBJECTIVE BOX
[facility] Division of Hospital Medicine    Chief Complaint:      SUBJECTIVE / OVERNIGHT EVENTS:  Patient seen and examined at bedside   Spiking fever   Patient c/o chills  c/o lower back pain   left knee pain is controlled     PHYSICAL EXAM:  Vital Signs Last 24 Hrs  T(C): 36.8 (26 Aug 2022 09:42), Max: 38.1 (26 Aug 2022 04:52)  T(F): 98.3 (26 Aug 2022 09:42), Max: 100.5 (26 Aug 2022 04:52)  HR: 80 (26 Aug 2022 09:42) (80 - 100)  BP: 144/75 (26 Aug 2022 09:42) (121/64 - 173/82)  BP(mean): --  RR: 18 (26 Aug 2022 09:42) (18 - 19)  SpO2: 93% (26 Aug 2022 09:42) (92% - 94%)  Parameters below as of 26 Aug 2022 09:42  Patient On (Oxygen Delivery Method): room air  CONSTITUTIONAL: NAD,   ENMT: Moist oral mucosa, no pharyngeal injection or exudates; normal dentition  RESPIRATORY: Normal respiratory effort; lungs are clear to auscultation bilaterally  CARDIOVASCULAR: Regular rate and rhythm, normal S1 and S2, no murmur/rub/gallop; No lower extremity edema; Peripheral pulses are 2+ bilaterally  ABDOMEN: Nontender to palpation, normoactive bowel sounds, no rebound/guarding; No hepatosplenomegaly  MUSCLOSKELETAL:  Normal gait; no clubbing or cyanosis of digits; no joint swelling or tenderness to palpation.  Left knee  medial aspect erythema, redness and swelling  PSYCH: A+O to person, place, and time; affect appropriate  NEUROLOGY: CN 2-12 are intact and symmetric; no gross sensory deficits;   SKIN: No rashes; no palpable lesions                        7.4    10.66 )-----------( 204      ( 26 Aug 2022 07:40 )             22.4     08-26    133<L>  |  99  |  32.9<H>  ----------------------------<  81  4.3   |  22.0  |  2.39<H>    Ca    9.0      26 Aug 2022 07:40    TPro  5.6<L>  /  Alb  2.5<L>  /  TBili  0.7  /  DBili  x   /  AST  47<H>  /  ALT  28  /  AlkPhos  213<H>  08-26              CAPILLARY BLOOD GLUCOSE      POCT Blood Glucose.: 151 mg/dL (26 Aug 2022 10:56)  POCT Blood Glucose.: 84 mg/dL (26 Aug 2022 07:41)  POCT Blood Glucose.: 116 mg/dL (25 Aug 2022 23:05)  POCT Blood Glucose.: 81 mg/dL (25 Aug 2022 22:00)  POCT Blood Glucose.: 57 mg/dL (25 Aug 2022 21:22)  POCT Blood Glucose.: 56 mg/dL (25 Aug 2022 21:11)  POCT Blood Glucose.: 106 mg/dL (25 Aug 2022 16:46)      MEDICATIONS  (STANDING):  amLODIPine   Tablet 5 milliGRAM(s) Oral daily  cadexomer iodine 0.9% Gel 1 Application(s) Topical daily  ceFAZolin   IVPB 2000 milliGRAM(s) IV Intermittent every 12 hours  dextrose 5%. 1000 milliLiter(s) (100 mL/Hr) IV Continuous <Continuous>  dextrose 5%. 1000 milliLiter(s) (50 mL/Hr) IV Continuous <Continuous>  dextrose 50% Injectable 25 Gram(s) IV Push once  dextrose 50% Injectable 12.5 Gram(s) IV Push once  dextrose 50% Injectable 25 Gram(s) IV Push once  glucagon  Injectable 1 milliGRAM(s) IntraMuscular once  heparin   Injectable 5000 Unit(s) SubCutaneous every 8 hours  insulin glargine Injectable (LANTUS) 18 Unit(s) SubCutaneous every morning  insulin lispro (ADMELOG) corrective regimen sliding scale   SubCutaneous three times a day before meals  naloxone Injectable 0.4 milliGRAM(s) IV Push once  polyethylene glycol 3350 17 Gram(s) Oral daily  senna 2 Tablet(s) Oral at bedtime  sodium chloride 0.9%. 1000 milliLiter(s) (75 mL/Hr) IV Continuous <Continuous>    MEDICATIONS  (PRN):  acetaminophen     Tablet .. 650 milliGRAM(s) Oral every 6 hours PRN Temp greater or equal to 38C (100.4F), Mild Pain (1 - 3)  bisacodyl 5 milliGRAM(s) Oral daily PRN Constipation  dextrose Oral Gel 15 Gram(s) Oral once PRN Blood Glucose LESS THAN 70 milliGRAM(s)/deciliter  morphine  - Injectable 2 milliGRAM(s) IV Push every 4 hours PRN Moderate Pain (4 - 6)      I&O's Summary             MRI left Knee   IMPRESSION: Evaluation limited by motion artifact.    1.  Large effusion and soft tissue edema about the knee. If there is   concern for septic arthritis/osteomyelitis, aspiration should be   performed.    2.  Prominent muscle edema involving the semimembranosus and   gastrocnemius muscles, may be reactive, infectious, or could represent   muscle strain.    3.  Mild free margin blunting of the medial meniscus posterior horn. No   acute ligamentous injury.    4.  Tiny focus of likely degenerative bone marrow edema at the quadriceps   tendon insertion at the superior pole of the patella. Otherwise, no   fracture or bone marrow edema.  No focal cartilage loss.    IMPRESSION:  1. Moderate size joint effusion is again seen with surrounding   subcutaneous edema.  2. No erosions are appreciated.  3. Consider arthrocentesis as warranted to assess for a septic joint.

## 2022-08-26 NOTE — PROGRESS NOTE ADULT - NUTRITIONAL ASSESSMENT
57 yr old M w/a PMH of T1DM presents with left knee pain.  At home he takes Tresiba 28 units in the morning and 3-4 units of Humalog with each meal, TID.    1. Controlled T1DM, a1c 6.4%  - Poor appetite, hypoglycemia yesterday, FS 84 this morning  - Lantus decreased to 18 units daily  - Premeal Admelog discontinued until appetite improves  - Continue sliding scale coverage    2. Septic joint/cellulitis   - +MSSA  - Now on Cefazolin, ID following  - Seen by acute surgery, no intervention at this time    3. ESTUARDO   - Likely from sepsis, slowing improving  - IVF @ 75cc/hr

## 2022-08-26 NOTE — PROVIDER CONTACT NOTE (CRITICAL VALUE NOTIFICATION) - BACKGROUND
DM 1 ESTUARDO cellulitis
Patient admitted for swelling of the knee, experiencing intermittent low grade fevers.

## 2022-08-27 LAB
ALBUMIN SERPL ELPH-MCNC: 2.9 G/DL — LOW (ref 3.3–5.2)
ALP SERPL-CCNC: 276 U/L — HIGH (ref 40–120)
ALT FLD-CCNC: 26 U/L — SIGNIFICANT CHANGE UP
ANION GAP SERPL CALC-SCNC: 12 MMOL/L — SIGNIFICANT CHANGE UP (ref 5–17)
AST SERPL-CCNC: 46 U/L — HIGH
BILIRUB SERPL-MCNC: 0.5 MG/DL — SIGNIFICANT CHANGE UP (ref 0.4–2)
BUN SERPL-MCNC: 34.1 MG/DL — HIGH (ref 8–20)
C TRACH RRNA SPEC QL NAA+PROBE: SIGNIFICANT CHANGE UP
CALCIUM SERPL-MCNC: 9.1 MG/DL — SIGNIFICANT CHANGE UP (ref 8.4–10.5)
CARDIOLIPIN AB SER-ACNC: NEGATIVE — SIGNIFICANT CHANGE UP
CHLORIDE SERPL-SCNC: 101 MMOL/L — SIGNIFICANT CHANGE UP (ref 98–107)
CO2 SERPL-SCNC: 23 MMOL/L — SIGNIFICANT CHANGE UP (ref 22–29)
CREAT SERPL-MCNC: 2.36 MG/DL — HIGH (ref 0.5–1.3)
CRP SERPL-MCNC: 172 MG/L — HIGH
CULTURE RESULTS: SIGNIFICANT CHANGE UP
EGFR: 32 ML/MIN/1.73M2 — LOW
ERYTHROCYTE [SEDIMENTATION RATE] IN BLOOD: 68 MM/HR — HIGH (ref 0–20)
FERRITIN SERPL-MCNC: 604 NG/ML — HIGH (ref 30–400)
FUNGITELL: <31 PG/ML — SIGNIFICANT CHANGE UP
GLUCOSE BLDC GLUCOMTR-MCNC: 113 MG/DL — HIGH (ref 70–99)
GLUCOSE BLDC GLUCOMTR-MCNC: 135 MG/DL — HIGH (ref 70–99)
GLUCOSE BLDC GLUCOMTR-MCNC: 157 MG/DL — HIGH (ref 70–99)
GLUCOSE BLDC GLUCOMTR-MCNC: 199 MG/DL — HIGH (ref 70–99)
GLUCOSE SERPL-MCNC: 128 MG/DL — HIGH (ref 70–99)
HCT VFR BLD CALC: 23.7 % — LOW (ref 39–50)
HGB BLD-MCNC: 7.8 G/DL — LOW (ref 13–17)
IRON SATN MFR SERPL: 17 UG/DL — LOW (ref 59–158)
IRON SATN MFR SERPL: 9 % — LOW (ref 16–55)
MCHC RBC-ENTMCNC: 29.5 PG — SIGNIFICANT CHANGE UP (ref 27–34)
MCHC RBC-ENTMCNC: 32.9 GM/DL — SIGNIFICANT CHANGE UP (ref 32–36)
MCV RBC AUTO: 89.8 FL — SIGNIFICANT CHANGE UP (ref 80–100)
N GONORRHOEA RRNA SPEC QL NAA+PROBE: SIGNIFICANT CHANGE UP
PLATELET # BLD AUTO: 276 K/UL — SIGNIFICANT CHANGE UP (ref 150–400)
POTASSIUM SERPL-MCNC: 4.3 MMOL/L — SIGNIFICANT CHANGE UP (ref 3.5–5.3)
POTASSIUM SERPL-SCNC: 4.3 MMOL/L — SIGNIFICANT CHANGE UP (ref 3.5–5.3)
PROT SERPL-MCNC: 5.9 G/DL — LOW (ref 6.6–8.7)
RBC # BLD: 2.64 M/UL — LOW (ref 4.2–5.8)
RBC # FLD: 12.6 % — SIGNIFICANT CHANGE UP (ref 10.3–14.5)
SODIUM SERPL-SCNC: 136 MMOL/L — SIGNIFICANT CHANGE UP (ref 135–145)
SPECIMEN SOURCE: SIGNIFICANT CHANGE UP
TIBC SERPL-MCNC: 196 UG/DL — LOW (ref 220–430)
TRANSFERRIN SERPL-MCNC: 137 MG/DL — LOW (ref 180–329)
WBC # BLD: 11 K/UL — HIGH (ref 3.8–10.5)
WBC # FLD AUTO: 11 K/UL — HIGH (ref 3.8–10.5)

## 2022-08-27 PROCEDURE — 99233 SBSQ HOSP IP/OBS HIGH 50: CPT

## 2022-08-27 RX ORDER — HYDROMORPHONE HYDROCHLORIDE 2 MG/ML
1 INJECTION INTRAMUSCULAR; INTRAVENOUS; SUBCUTANEOUS ONCE
Refills: 0 | Status: DISCONTINUED | OUTPATIENT
Start: 2022-08-27 | End: 2022-08-28

## 2022-08-27 RX ORDER — ACETAMINOPHEN 500 MG
1000 TABLET ORAL ONCE
Refills: 0 | Status: COMPLETED | OUTPATIENT
Start: 2022-08-27 | End: 2022-08-27

## 2022-08-27 RX ADMIN — Medication 400 MILLIGRAM(S): at 11:38

## 2022-08-27 RX ADMIN — OXYCODONE AND ACETAMINOPHEN 1 TABLET(S): 5; 325 TABLET ORAL at 06:15

## 2022-08-27 RX ADMIN — OXYCODONE AND ACETAMINOPHEN 1 TABLET(S): 5; 325 TABLET ORAL at 21:37

## 2022-08-27 RX ADMIN — Medication 1: at 16:18

## 2022-08-27 RX ADMIN — HEPARIN SODIUM 5000 UNIT(S): 5000 INJECTION INTRAVENOUS; SUBCUTANEOUS at 05:56

## 2022-08-27 RX ADMIN — OXYCODONE AND ACETAMINOPHEN 1 TABLET(S): 5; 325 TABLET ORAL at 22:37

## 2022-08-27 RX ADMIN — Medication 1000 MILLIGRAM(S): at 12:00

## 2022-08-27 RX ADMIN — Medication 1 APPLICATION(S): at 11:38

## 2022-08-27 RX ADMIN — HEPARIN SODIUM 5000 UNIT(S): 5000 INJECTION INTRAVENOUS; SUBCUTANEOUS at 21:37

## 2022-08-27 RX ADMIN — POLYETHYLENE GLYCOL 3350 17 GRAM(S): 17 POWDER, FOR SOLUTION ORAL at 11:38

## 2022-08-27 RX ADMIN — AMLODIPINE BESYLATE 5 MILLIGRAM(S): 2.5 TABLET ORAL at 05:55

## 2022-08-27 RX ADMIN — Medication 100 MILLIGRAM(S): at 21:37

## 2022-08-27 RX ADMIN — INSULIN GLARGINE 18 UNIT(S): 100 INJECTION, SOLUTION SUBCUTANEOUS at 08:32

## 2022-08-27 RX ADMIN — HEPARIN SODIUM 5000 UNIT(S): 5000 INJECTION INTRAVENOUS; SUBCUTANEOUS at 12:07

## 2022-08-27 RX ADMIN — Medication 100 MILLIGRAM(S): at 09:19

## 2022-08-27 NOTE — PROGRESS NOTE ADULT - SUBJECTIVE AND OBJECTIVE BOX
HAILEY CARD  ----------------------------------------  The patient was seen at bedside. Patient with bacteremia / cellulitis. Noted some left knee discomfort and chills. Denied chest pain or dyspnea.    Vital Signs Last 24 Hrs  T(C): 36.8 (27 Aug 2022 10:43), Max: 37.6 (26 Aug 2022 21:35)  T(F): 98.2 (27 Aug 2022 10:43), Max: 99.7 (26 Aug 2022 21:35)  HR: 99 (27 Aug 2022 10:43) (93 - 105)  BP: 160/80 (27 Aug 2022 10:43) (145/80 - 169/82)  BP(mean): --  RR: 18 (27 Aug 2022 10:43) (18 - 18)  SpO2: 95% (27 Aug 2022 10:43) (91% - 95%)    Parameters below as of 27 Aug 2022 10:43  Patient On (Oxygen Delivery Method): room air    CAPILLARY BLOOD GLUCOSE  POCT Blood Glucose.: 113 mg/dL (27 Aug 2022 07:57)  POCT Blood Glucose.: 151 mg/dL (26 Aug 2022 22:11)  POCT Blood Glucose.: 158 mg/dL (26 Aug 2022 16:57)  POCT Blood Glucose.: 181 mg/dL (26 Aug 2022 13:28)    PHYSICAL EXAMINATION:  ----------------------------------------  General appearance: No acute distress, Awake, Alert  HEENT: Normocephalic, Atraumatic, Conjunctiva clear, EOMI  Neck: Supple, No JVD, No tenderness  Lungs: Breath sound equal bilaterally, No wheezes, No rales  Cardiovascular: S1S2, Regular rhythm  Abdomen: Soft, Nontender, Nondistended, No guarding/rebound, Positive bowel sounds  Extremities: No clubbing, No cyanosis, No calf tenderness, Left knee swelling and mild erythema, Left lower extremity edema, Small ulceration to the plantar surface of the right foot, Small ulcerations to the fingers  Neuro: Strength equal bilaterally, No tremors  Psychiatric: Appropriate mood, Normal affect    LABORATORY STUDIES:  ----------------------------------------             7.8    11.00 )-----------( 276      ( 27 Aug 2022 05:12 )             23.7     08-27    136  |  101  |  34.1<H>  ----------------------------<  128<H>  4.3   |  23.0  |  2.36<H>    Ca    9.1      27 Aug 2022 05:12    TPro  5.9<L>  /  Alb  2.9<L>  /  TBili  0.5  /  DBili  x   /  AST  46<H>  /  ALT  26  /  AlkPhos  276<H>  08-27    LIVER FUNCTIONS - ( 27 Aug 2022 05:12 )  Alb: 2.9 g/dL / Pro: 5.9 g/dL / ALK PHOS: 276 U/L / ALT: 26 U/L / AST: 46 U/L / GGT: x           Culture - Blood (collected 25 Aug 2022 12:40)  Source: .Blood Blood-Peripheral  Preliminary Report (26 Aug 2022 19:02):    No growth to date.    Culture - Blood (collected 25 Aug 2022 12:40)  Source: .Blood Blood-Peripheral  Preliminary Report (26 Aug 2022 19:02):    No growth to date.    MEDICATIONS  (STANDING):  amLODIPine   Tablet 5 milliGRAM(s) Oral daily  cadexomer iodine 0.9% Gel 1 Application(s) Topical daily  ceFAZolin   IVPB 2000 milliGRAM(s) IV Intermittent every 12 hours  dextrose 5%. 1000 milliLiter(s) (50 mL/Hr) IV Continuous <Continuous>  dextrose 5%. 1000 milliLiter(s) (100 mL/Hr) IV Continuous <Continuous>  dextrose 50% Injectable 25 Gram(s) IV Push once  dextrose 50% Injectable 12.5 Gram(s) IV Push once  dextrose 50% Injectable 25 Gram(s) IV Push once  glucagon  Injectable 1 milliGRAM(s) IntraMuscular once  heparin   Injectable 5000 Unit(s) SubCutaneous every 8 hours  insulin glargine Injectable (LANTUS) 18 Unit(s) SubCutaneous every morning  insulin lispro (ADMELOG) corrective regimen sliding scale   SubCutaneous three times a day before meals  naloxone Injectable 0.4 milliGRAM(s) IV Push once  polyethylene glycol 3350 17 Gram(s) Oral daily  senna 2 Tablet(s) Oral at bedtime  sodium chloride 0.9%. 1000 milliLiter(s) (75 mL/Hr) IV Continuous <Continuous>    MEDICATIONS  (PRN):  acetaminophen     Tablet .. 650 milliGRAM(s) Oral every 6 hours PRN Temp greater or equal to 38C (100.4F), Mild Pain (1 - 3)  bisacodyl 5 milliGRAM(s) Oral daily PRN Constipation  dextrose Oral Gel 15 Gram(s) Oral once PRN Blood Glucose LESS THAN 70 milliGRAM(s)/deciliter  morphine  - Injectable 2 milliGRAM(s) IV Push every 4 hours PRN Moderate Pain (4 - 6)  oxycodone    5 mG/acetaminophen 325 mG 1 Tablet(s) Oral every 4 hours PRN Moderate Pain (4 - 6)      ASSESSMENT / PLAN:  ----------------------------------------  57M with a history of diabetes, peripheral neuropathy, and right foot ulcer who presented with left knee pain, fever, and chills. He was found to have bacteremia with cultures growing MSSA for which intravenous antibiotics were continued.    Sepsis / Cellulitis - MRI of the knee noted effusion but arthrocentesis was not indicative of a septic joint. Repeat blood culture from 8/25 was without growth to date. On cefazolin. Recurrent fevers noted. Mild leukocytosis which is improved from admission. Fungitell was not elevated. Infectious Disease to follow. Rheumatology consultation noted, serology results to be reviewed when available,    Rhabdomyolysis - CPK was elevated on presentation. For repeat CPK levels.    Acute kidney injury - Renal function initially improved. Monitoring renal function. Suspect component of chronic kidney disease.    Diabetes - Insulin coverage, close monitoring of blood glucose levels.    Anemia - Monitoring hemoglobin levels. Suspect secondary to chronic disease.    Hypertension - Close blood pressure monitoring.    Hyponatremia - Improved on repeat studies. HAILEY CARD  ----------------------------------------  The patient was seen at bedside. Patient with bacteremia / cellulitis. Noted some left knee discomfort and chills. Denied chest pain or dyspnea.    Vital Signs Last 24 Hrs  T(C): 36.8 (27 Aug 2022 10:43), Max: 37.6 (26 Aug 2022 21:35)  T(F): 98.2 (27 Aug 2022 10:43), Max: 99.7 (26 Aug 2022 21:35)  HR: 99 (27 Aug 2022 10:43) (93 - 105)  BP: 160/80 (27 Aug 2022 10:43) (145/80 - 169/82)  BP(mean): --  RR: 18 (27 Aug 2022 10:43) (18 - 18)  SpO2: 95% (27 Aug 2022 10:43) (91% - 95%)    Parameters below as of 27 Aug 2022 10:43  Patient On (Oxygen Delivery Method): room air    CAPILLARY BLOOD GLUCOSE  POCT Blood Glucose.: 113 mg/dL (27 Aug 2022 07:57)  POCT Blood Glucose.: 151 mg/dL (26 Aug 2022 22:11)  POCT Blood Glucose.: 158 mg/dL (26 Aug 2022 16:57)  POCT Blood Glucose.: 181 mg/dL (26 Aug 2022 13:28)    PHYSICAL EXAMINATION:  ----------------------------------------  General appearance: No acute distress, Awake, Alert  HEENT: Normocephalic, Atraumatic, Conjunctiva clear, EOMI  Neck: Supple, No JVD, No tenderness  Lungs: Breath sound equal bilaterally, No wheezes, No rales  Cardiovascular: S1S2, Regular rhythm  Abdomen: Soft, Nontender, Nondistended, No guarding/rebound, Positive bowel sounds  Extremities: No clubbing, No cyanosis, No calf tenderness, Left knee swelling and mild erythema, Left lower extremity edema, Small ulceration to the plantar surface of the right foot, Small ulcerations to the fingers  Neuro: Strength equal bilaterally, No tremors  Psychiatric: Appropriate mood, Normal affect    LABORATORY STUDIES:  ----------------------------------------             7.8    11.00 )-----------( 276      ( 27 Aug 2022 05:12 )             23.7     08-27    136  |  101  |  34.1<H>  ----------------------------<  128<H>  4.3   |  23.0  |  2.36<H>    Ca    9.1      27 Aug 2022 05:12    TPro  5.9<L>  /  Alb  2.9<L>  /  TBili  0.5  /  DBili  x   /  AST  46<H>  /  ALT  26  /  AlkPhos  276<H>  08-27    LIVER FUNCTIONS - ( 27 Aug 2022 05:12 )  Alb: 2.9 g/dL / Pro: 5.9 g/dL / ALK PHOS: 276 U/L / ALT: 26 U/L / AST: 46 U/L / GGT: x           Culture - Blood (collected 25 Aug 2022 12:40)  Source: .Blood Blood-Peripheral  Preliminary Report (26 Aug 2022 19:02):    No growth to date.    Culture - Blood (collected 25 Aug 2022 12:40)  Source: .Blood Blood-Peripheral  Preliminary Report (26 Aug 2022 19:02):    No growth to date.    MEDICATIONS  (STANDING):  amLODIPine   Tablet 5 milliGRAM(s) Oral daily  cadexomer iodine 0.9% Gel 1 Application(s) Topical daily  ceFAZolin   IVPB 2000 milliGRAM(s) IV Intermittent every 12 hours  dextrose 5%. 1000 milliLiter(s) (50 mL/Hr) IV Continuous <Continuous>  dextrose 5%. 1000 milliLiter(s) (100 mL/Hr) IV Continuous <Continuous>  dextrose 50% Injectable 25 Gram(s) IV Push once  dextrose 50% Injectable 12.5 Gram(s) IV Push once  dextrose 50% Injectable 25 Gram(s) IV Push once  glucagon  Injectable 1 milliGRAM(s) IntraMuscular once  heparin   Injectable 5000 Unit(s) SubCutaneous every 8 hours  insulin glargine Injectable (LANTUS) 18 Unit(s) SubCutaneous every morning  insulin lispro (ADMELOG) corrective regimen sliding scale   SubCutaneous three times a day before meals  naloxone Injectable 0.4 milliGRAM(s) IV Push once  polyethylene glycol 3350 17 Gram(s) Oral daily  senna 2 Tablet(s) Oral at bedtime  sodium chloride 0.9%. 1000 milliLiter(s) (75 mL/Hr) IV Continuous <Continuous>    MEDICATIONS  (PRN):  acetaminophen     Tablet .. 650 milliGRAM(s) Oral every 6 hours PRN Temp greater or equal to 38C (100.4F), Mild Pain (1 - 3)  bisacodyl 5 milliGRAM(s) Oral daily PRN Constipation  dextrose Oral Gel 15 Gram(s) Oral once PRN Blood Glucose LESS THAN 70 milliGRAM(s)/deciliter  morphine  - Injectable 2 milliGRAM(s) IV Push every 4 hours PRN Moderate Pain (4 - 6)  oxycodone    5 mG/acetaminophen 325 mG 1 Tablet(s) Oral every 4 hours PRN Moderate Pain (4 - 6)      ASSESSMENT / PLAN:  ----------------------------------------  57M with a history of diabetes, peripheral neuropathy, and right foot ulcer who presented with left knee pain, fever, and chills. He was found to have bacteremia with cultures growing MSSA for which intravenous antibiotics were continued.    Sepsis / Cellulitis - MRI of the knee noted effusion but arthrocentesis was not indicative of a septic joint. Repeat blood culture from 8/25 was without growth to date. On cefazolin. Recurrent fevers noted. Mild leukocytosis which is improved from admission. Fungitell was not elevated. Echocardiogram was without obvious vegetation or valve disease. Pending MRI of the spine. Infectious Disease to follow. Rheumatology consultation noted, serology results to be reviewed when available,    Rhabdomyolysis - CPK was elevated on presentation. For repeat CPK levels.    Acute kidney injury - Renal function initially improved. Monitoring renal function. Suspect component of chronic kidney disease.    Diabetes - Insulin coverage, close monitoring of blood glucose levels.    Anemia - Monitoring hemoglobin levels. Suspect secondary to chronic disease.    Hypertension - Close blood pressure monitoring.    Hyponatremia - Improved on repeat studies.

## 2022-08-27 NOTE — PROGRESS NOTE ADULT - ASSESSMENT
T1DM  cont current RX  Admelog on hold as pt with very limtied appetite        cellulits/sepsis/MSSA- cont IV antibitoics    for MRI of spine

## 2022-08-27 NOTE — PROGRESS NOTE ADULT - SUBJECTIVE AND OBJECTIVE BOX
INTERVAL HPI/OVERNIGHT EVENTS:  follow up t1DM   Pt still with swellign in knee and leg   Lesss appetite   MEDICATIONS  (STANDING):  amLODIPine   Tablet 5 milliGRAM(s) Oral daily  cadexomer iodine 0.9% Gel 1 Application(s) Topical daily  ceFAZolin   IVPB 2000 milliGRAM(s) IV Intermittent every 12 hours  dextrose 5%. 1000 milliLiter(s) (100 mL/Hr) IV Continuous <Continuous>  dextrose 5%. 1000 milliLiter(s) (50 mL/Hr) IV Continuous <Continuous>  dextrose 50% Injectable 25 Gram(s) IV Push once  dextrose 50% Injectable 12.5 Gram(s) IV Push once  dextrose 50% Injectable 25 Gram(s) IV Push once  glucagon  Injectable 1 milliGRAM(s) IntraMuscular once  heparin   Injectable 5000 Unit(s) SubCutaneous every 8 hours  HYDROmorphone  Injectable 1 milliGRAM(s) IV Push once  insulin glargine Injectable (LANTUS) 18 Unit(s) SubCutaneous every morning  insulin lispro (ADMELOG) corrective regimen sliding scale   SubCutaneous three times a day before meals  naloxone Injectable 0.4 milliGRAM(s) IV Push once  polyethylene glycol 3350 17 Gram(s) Oral daily  senna 2 Tablet(s) Oral at bedtime  sodium chloride 0.9%. 1000 milliLiter(s) (75 mL/Hr) IV Continuous <Continuous>    MEDICATIONS  (PRN):  acetaminophen     Tablet .. 650 milliGRAM(s) Oral every 6 hours PRN Temp greater or equal to 38C (100.4F), Mild Pain (1 - 3)  bisacodyl 5 milliGRAM(s) Oral daily PRN Constipation  dextrose Oral Gel 15 Gram(s) Oral once PRN Blood Glucose LESS THAN 70 milliGRAM(s)/deciliter  morphine  - Injectable 2 milliGRAM(s) IV Push every 4 hours PRN Moderate Pain (4 - 6)  oxycodone    5 mG/acetaminophen 325 mG 1 Tablet(s) Oral every 4 hours PRN Moderate Pain (4 - 6)      Allergies    No Known Allergies    Intolerances        Review of systems:  still with left leg/knee swelling limiting ROM   N o CP no pressure no dyspnea     Vital Signs Last 24 Hrs  T(C): 37.3 (27 Aug 2022 20:46), Max: 37.5 (27 Aug 2022 04:57)  T(F): 99.2 (27 Aug 2022 20:46), Max: 99.5 (27 Aug 2022 04:57)  HR: 85 (27 Aug 2022 20:46) (85 - 99)  BP: 149/73 (27 Aug 2022 20:46) (145/69 - 160/80)  BP(mean): --  RR: 18 (27 Aug 2022 20:46) (18 - 18)  SpO2: 95% (27 Aug 2022 20:46) (91% - 95%)    Parameters below as of 27 Aug 2022 20:46  Patient On (Oxygen Delivery Method): room air        PHYSICAL EXAM:      Constitutional: NAD, well-groomed, well-developed  HEENT: PERRLA, EOMI, no exophalmos  Respiratory: CTAB  Cardiovascular: S1 and S2, RRR, no M/G/R    Extremities: left knee swollen   overall leg is less erythematous         LABS:                        7.8    11.00 )-----------( 276      ( 27 Aug 2022 05:12 )             23.7     08-27    136  |  101  |  34.1<H>  ----------------------------<  128<H>  4.3   |  23.0  |  2.36<H>    Ca    9.1      27 Aug 2022 05:12    TPro  5.9<L>  /  Alb  2.9<L>  /  TBili  0.5  /  DBili  x   /  AST  46<H>  /  ALT  26  /  AlkPhos  276<H>  08-27          CAPILLARY BLOOD GLUCOSE  CAPILLARY BLOOD GLUCOSE      POCT Blood Glucose.: 199 mg/dL (27 Aug 2022 21:44)  POCT Blood Glucose.: 157 mg/dL (27 Aug 2022 16:16)  POCT Blood Glucose.: 135 mg/dL (27 Aug 2022 11:44)  POCT Blood Glucose.: 113 mg/dL (27 Aug 2022 07:57)  POCT Blood Glucose.: 151 mg/dL (26 Aug 2022 22:11)      RADIOLOGY & ADDITIONAL TESTS:

## 2022-08-28 LAB
ANION GAP SERPL CALC-SCNC: 12 MMOL/L — SIGNIFICANT CHANGE UP (ref 5–17)
BUN SERPL-MCNC: 34.6 MG/DL — HIGH (ref 8–20)
CALCIUM SERPL-MCNC: 8.9 MG/DL — SIGNIFICANT CHANGE UP (ref 8.4–10.5)
CHLORIDE SERPL-SCNC: 99 MMOL/L — SIGNIFICANT CHANGE UP (ref 98–107)
CK MB CFR SERPL CALC: 2.7 NG/ML — SIGNIFICANT CHANGE UP (ref 0–6.7)
CK SERPL-CCNC: 159 U/L — SIGNIFICANT CHANGE UP (ref 30–200)
CO2 SERPL-SCNC: 23 MMOL/L — SIGNIFICANT CHANGE UP (ref 22–29)
CREAT SERPL-MCNC: 2.32 MG/DL — HIGH (ref 0.5–1.3)
EGFR: 32 ML/MIN/1.73M2 — LOW
GLUCOSE BLDC GLUCOMTR-MCNC: 145 MG/DL — HIGH (ref 70–99)
GLUCOSE BLDC GLUCOMTR-MCNC: 178 MG/DL — HIGH (ref 70–99)
GLUCOSE BLDC GLUCOMTR-MCNC: 184 MG/DL — HIGH (ref 70–99)
GLUCOSE SERPL-MCNC: 172 MG/DL — HIGH (ref 70–99)
HCT VFR BLD CALC: 22.4 % — LOW (ref 39–50)
HGB BLD-MCNC: 7.5 G/DL — LOW (ref 13–17)
MCHC RBC-ENTMCNC: 29.5 PG — SIGNIFICANT CHANGE UP (ref 27–34)
MCHC RBC-ENTMCNC: 33.5 GM/DL — SIGNIFICANT CHANGE UP (ref 32–36)
MCV RBC AUTO: 88.2 FL — SIGNIFICANT CHANGE UP (ref 80–100)
PLATELET # BLD AUTO: 299 K/UL — SIGNIFICANT CHANGE UP (ref 150–400)
POTASSIUM SERPL-MCNC: 4.4 MMOL/L — SIGNIFICANT CHANGE UP (ref 3.5–5.3)
POTASSIUM SERPL-SCNC: 4.4 MMOL/L — SIGNIFICANT CHANGE UP (ref 3.5–5.3)
RBC # BLD: 2.54 M/UL — LOW (ref 4.2–5.8)
RBC # FLD: 12.3 % — SIGNIFICANT CHANGE UP (ref 10.3–14.5)
SODIUM SERPL-SCNC: 133 MMOL/L — LOW (ref 135–145)
WBC # BLD: 11.98 K/UL — HIGH (ref 3.8–10.5)
WBC # FLD AUTO: 11.98 K/UL — HIGH (ref 3.8–10.5)

## 2022-08-28 PROCEDURE — 99233 SBSQ HOSP IP/OBS HIGH 50: CPT

## 2022-08-28 PROCEDURE — 72148 MRI LUMBAR SPINE W/O DYE: CPT | Mod: 26

## 2022-08-28 RX ORDER — INSULIN LISPRO 100/ML
4 VIAL (ML) SUBCUTANEOUS
Refills: 0 | Status: DISCONTINUED | OUTPATIENT
Start: 2022-08-28 | End: 2022-08-30

## 2022-08-28 RX ORDER — HYDROMORPHONE HYDROCHLORIDE 2 MG/ML
4 INJECTION INTRAMUSCULAR; INTRAVENOUS; SUBCUTANEOUS ONCE
Refills: 0 | Status: DISCONTINUED | OUTPATIENT
Start: 2022-08-28 | End: 2022-08-28

## 2022-08-28 RX ADMIN — HEPARIN SODIUM 5000 UNIT(S): 5000 INJECTION INTRAVENOUS; SUBCUTANEOUS at 12:47

## 2022-08-28 RX ADMIN — OXYCODONE AND ACETAMINOPHEN 1 TABLET(S): 5; 325 TABLET ORAL at 05:43

## 2022-08-28 RX ADMIN — HEPARIN SODIUM 5000 UNIT(S): 5000 INJECTION INTRAVENOUS; SUBCUTANEOUS at 22:23

## 2022-08-28 RX ADMIN — OXYCODONE AND ACETAMINOPHEN 1 TABLET(S): 5; 325 TABLET ORAL at 17:23

## 2022-08-28 RX ADMIN — AMLODIPINE BESYLATE 5 MILLIGRAM(S): 2.5 TABLET ORAL at 06:37

## 2022-08-28 RX ADMIN — Medication 1: at 08:19

## 2022-08-28 RX ADMIN — HEPARIN SODIUM 5000 UNIT(S): 5000 INJECTION INTRAVENOUS; SUBCUTANEOUS at 06:38

## 2022-08-28 RX ADMIN — Medication 4 UNIT(S): at 16:46

## 2022-08-28 RX ADMIN — OXYCODONE AND ACETAMINOPHEN 1 TABLET(S): 5; 325 TABLET ORAL at 11:39

## 2022-08-28 RX ADMIN — INSULIN GLARGINE 18 UNIT(S): 100 INJECTION, SOLUTION SUBCUTANEOUS at 08:31

## 2022-08-28 RX ADMIN — OXYCODONE AND ACETAMINOPHEN 1 TABLET(S): 5; 325 TABLET ORAL at 22:12

## 2022-08-28 RX ADMIN — OXYCODONE AND ACETAMINOPHEN 1 TABLET(S): 5; 325 TABLET ORAL at 04:43

## 2022-08-28 RX ADMIN — OXYCODONE AND ACETAMINOPHEN 1 TABLET(S): 5; 325 TABLET ORAL at 23:22

## 2022-08-28 RX ADMIN — HYDROMORPHONE HYDROCHLORIDE 4 MILLIGRAM(S): 2 INJECTION INTRAMUSCULAR; INTRAVENOUS; SUBCUTANEOUS at 08:27

## 2022-08-28 RX ADMIN — Medication 1: at 16:25

## 2022-08-28 RX ADMIN — Medication 100 MILLIGRAM(S): at 17:23

## 2022-08-28 RX ADMIN — Medication 100 MILLIGRAM(S): at 06:38

## 2022-08-28 NOTE — PROGRESS NOTE ADULT - SUBJECTIVE AND OBJECTIVE BOX
HAILEY CARD  ----------------------------------------  The patient was seen at bedside. Patient with bacteremia. Reported left knee pain and lower extremity swelling.    Vital Signs Last 24 Hrs  T(C): 37.2 (28 Aug 2022 04:39), Max: 37.3 (27 Aug 2022 20:46)  T(F): 99 (28 Aug 2022 04:39), Max: 99.2 (27 Aug 2022 20:46)  HR: 88 (28 Aug 2022 04:39) (85 - 93)  BP: 163/80 (28 Aug 2022 04:39) (145/69 - 163/80)  BP(mean): --  RR: 18 (28 Aug 2022 04:39) (18 - 18)  SpO2: 95% (28 Aug 2022 04:39) (95% - 95%)    Parameters below as of 28 Aug 2022 04:39  Patient On (Oxygen Delivery Method): room air    CAPILLARY BLOOD GLUCOSE  POCT Blood Glucose.: 145 mg/dL (28 Aug 2022 11:45)  POCT Blood Glucose.: 184 mg/dL (28 Aug 2022 07:32)  POCT Blood Glucose.: 199 mg/dL (27 Aug 2022 21:44)  POCT Blood Glucose.: 157 mg/dL (27 Aug 2022 16:16)    PHYSICAL EXAMINATION:  ----------------------------------------  General appearance: No acute distress, Awake, Alert  HEENT: Normocephalic, Atraumatic, Conjunctiva clear, EOMI  Neck: Supple, No JVD, No tenderness  Lungs: Breath sound equal bilaterally, No wheezes, No rales  Cardiovascular: S1S2, Regular rhythm  Abdomen: Soft, Nontender, Nondistended, No guarding/rebound, Positive bowel sounds  Extremities: No clubbing, No cyanosis, No calf tenderness, Left knee swelling and mild erythema, Left lower extremity edema, Small ulceration to the plantar surface of the right foot, Small ulcerations to the fingers  Neuro: Strength equal bilaterally, No tremors  Psychiatric: Appropriate mood, Normal affect    LABORATORY STUDIES:  ----------------------------------------             7.5    11.98 )-----------( 299      ( 28 Aug 2022 06:30 )             22.4     08-28    133<L>  |  99  |  34.6<H>  ----------------------------<  172<H>  4.4   |  23.0  |  2.32<H>    Ca    8.9      28 Aug 2022 06:30    TPro  5.9<L>  /  Alb  2.9<L>  /  TBili  0.5  /  DBili  x   /  AST  46<H>  /  ALT  26  /  AlkPhos  276<H>  08-27    LIVER FUNCTIONS - ( 27 Aug 2022 05:12 )  Alb: 2.9 g/dL / Pro: 5.9 g/dL / ALK PHOS: 276 U/L / ALT: 26 U/L / AST: 46 U/L / GGT: x           CARDIAC MARKERS ( 28 Aug 2022 06:30 )  x     / x     / 159 U/L / x     / 2.7 ng/mL    MEDICATIONS  (STANDING):  amLODIPine   Tablet 5 milliGRAM(s) Oral daily  cadexomer iodine 0.9% Gel 1 Application(s) Topical daily  ceFAZolin   IVPB 2000 milliGRAM(s) IV Intermittent every 12 hours  dextrose 5%. 1000 milliLiter(s) (100 mL/Hr) IV Continuous <Continuous>  dextrose 5%. 1000 milliLiter(s) (50 mL/Hr) IV Continuous <Continuous>  dextrose 50% Injectable 25 Gram(s) IV Push once  dextrose 50% Injectable 12.5 Gram(s) IV Push once  dextrose 50% Injectable 25 Gram(s) IV Push once  glucagon  Injectable 1 milliGRAM(s) IntraMuscular once  heparin   Injectable 5000 Unit(s) SubCutaneous every 8 hours  insulin glargine Injectable (LANTUS) 18 Unit(s) SubCutaneous every morning  insulin lispro (ADMELOG) corrective regimen sliding scale   SubCutaneous three times a day before meals  naloxone Injectable 0.4 milliGRAM(s) IV Push once  polyethylene glycol 3350 17 Gram(s) Oral daily  senna 2 Tablet(s) Oral at bedtime    MEDICATIONS  (PRN):  acetaminophen     Tablet .. 650 milliGRAM(s) Oral every 6 hours PRN Temp greater or equal to 38C (100.4F), Mild Pain (1 - 3)  bisacodyl 5 milliGRAM(s) Oral daily PRN Constipation  dextrose Oral Gel 15 Gram(s) Oral once PRN Blood Glucose LESS THAN 70 milliGRAM(s)/deciliter  morphine  - Injectable 2 milliGRAM(s) IV Push every 4 hours PRN Moderate Pain (4 - 6)  oxycodone    5 mG/acetaminophen 325 mG 1 Tablet(s) Oral every 4 hours PRN Moderate Pain (4 - 6)      ASSESSMENT / PLAN:  ----------------------------------------  57M with a history of diabetes, peripheral neuropathy, and right foot ulcer who presented with left knee pain, fever, and chills. He was found to have bacteremia with cultures growing MSSA for which intravenous antibiotics were continued.    Sepsis / Cellulitis - MRI of the knee noted effusion but arthrocentesis was not indicative of a septic joint. Repeat blood culture from 8/25 was without growth. On cefazolin. Recurrent fevers noted. Mild leukocytosis which is improved from admission. Fungitell was not elevated. Echocardiogram was without obvious vegetation or valve disease. MRI of the lumbar spine was without infectious process. Rheumatology consultation noted, serology was without obvious findings.    Rhabdomyolysis - CPK was elevated on presentation. Repeat CPK levels normalized.    Acute kidney injury - Renal function initially improved, now stable. Suspect component of chronic kidney disease from diabetes.    Diabetes - Insulin coverage, close monitoring of blood glucose levels.    Anemia - Monitoring hemoglobin levels. Suspect secondary to chronic disease.    Hypertension - Close blood pressure monitoring.    Hyponatremia - Improved on repeat studies.

## 2022-08-28 NOTE — PROGRESS NOTE ADULT - SUBJECTIVE AND OBJECTIVE BOX
INTERVAL HPI/OVERNIGHT EVENTS:  follwo up T1DM   pt eatign a bit more today   stillfrustrated with how long recovery is taking   MEDICATIONS  (STANDING):  amLODIPine   Tablet 5 milliGRAM(s) Oral daily  cadexomer iodine 0.9% Gel 1 Application(s) Topical daily  ceFAZolin   IVPB 2000 milliGRAM(s) IV Intermittent every 12 hours  dextrose 5%. 1000 milliLiter(s) (100 mL/Hr) IV Continuous <Continuous>  dextrose 5%. 1000 milliLiter(s) (50 mL/Hr) IV Continuous <Continuous>  dextrose 50% Injectable 25 Gram(s) IV Push once  dextrose 50% Injectable 12.5 Gram(s) IV Push once  dextrose 50% Injectable 25 Gram(s) IV Push once  glucagon  Injectable 1 milliGRAM(s) IntraMuscular once  heparin   Injectable 5000 Unit(s) SubCutaneous every 8 hours  insulin glargine Injectable (LANTUS) 18 Unit(s) SubCutaneous every morning  insulin lispro (ADMELOG) corrective regimen sliding scale   SubCutaneous three times a day before meals  insulin lispro Injectable (ADMELOG) 4 Unit(s) SubCutaneous three times a day before meals  naloxone Injectable 0.4 milliGRAM(s) IV Push once  polyethylene glycol 3350 17 Gram(s) Oral daily  senna 2 Tablet(s) Oral at bedtime    MEDICATIONS  (PRN):  acetaminophen     Tablet .. 650 milliGRAM(s) Oral every 6 hours PRN Temp greater or equal to 38C (100.4F), Mild Pain (1 - 3)  bisacodyl 5 milliGRAM(s) Oral daily PRN Constipation  dextrose Oral Gel 15 Gram(s) Oral once PRN Blood Glucose LESS THAN 70 milliGRAM(s)/deciliter  morphine  - Injectable 2 milliGRAM(s) IV Push every 4 hours PRN Moderate Pain (4 - 6)  oxycodone    5 mG/acetaminophen 325 mG 1 Tablet(s) Oral every 4 hours PRN Moderate Pain (4 - 6)      Allergies    No Known Allergies    Intolerances        Review of systems:    Vital Signs Last 24 Hrs  T(C): 37.2 (28 Aug 2022 04:39), Max: 37.2 (28 Aug 2022 04:39)  T(F): 99 (28 Aug 2022 04:39), Max: 99 (28 Aug 2022 04:39)  HR: 88 (28 Aug 2022 04:39) (88 - 88)  BP: 163/80 (28 Aug 2022 04:39) (163/80 - 163/80)  BP(mean): --  RR: 18 (28 Aug 2022 04:39) (18 - 18)  SpO2: 95% (28 Aug 2022 04:39) (95% - 95%)    Parameters below as of 28 Aug 2022 04:39  Patient On (Oxygen Delivery Method): room air        PHYSICAL EXAM:      Constitutional: NAD, well-groomed, well-developed    Neck: No LAD, No JVD, trachea midline, no thyroid enlargement  Back: Normal spine flexure, No CVA tenderness  Respiratory: CTAB  Cardiovascular: S1 and S2, RRR, no M/G/R  Extremities: left leg still with swelling  usign ice apck now as ewll     Psychiatric: Normal mood, normal affect  Musculoskeletal: 5/5 strength b/l upper and lower extremities  healing ulcer on fingers         LABS:                        7.5    11.98 )-----------( 299      ( 28 Aug 2022 06:30 )             22.4     08-28    133<L>  |  99  |  34.6<H>  ----------------------------<  172<H>  4.4   |  23.0  |  2.32<H>    Ca    8.9      28 Aug 2022 06:30    TPro  5.9<L>  /  Alb  2.9<L>  /  TBili  0.5  /  DBili  x   /  AST  46<H>  /  ALT  26  /  AlkPhos  276<H>  08-27          CAPILLARY BLOOD GLUCOSE  CAPILLARY BLOOD GLUCOSE      POCT Blood Glucose.: 178 mg/dL (28 Aug 2022 16:22)  POCT Blood Glucose.: 145 mg/dL (28 Aug 2022 11:45)  POCT Blood Glucose.: 184 mg/dL (28 Aug 2022 07:32)      RADIOLOGY & ADDITIONAL TESTS:

## 2022-08-28 NOTE — PROGRESS NOTE ADULT - ASSESSMENT
T1DM   cont curent RX   MSSA sepsis - dw pt need tomake sure ahve neg BC  befre PICC Line can be isnerted  - encouraged patience

## 2022-08-29 LAB
ACE SERPL-CCNC: 21 U/L — SIGNIFICANT CHANGE UP (ref 14–82)
ALDOLASE SERPL-CCNC: 12.9 U/L — HIGH (ref 3.3–10.3)
ANION GAP SERPL CALC-SCNC: 13 MMOL/L — SIGNIFICANT CHANGE UP (ref 5–17)
BUN SERPL-MCNC: 40 MG/DL — HIGH (ref 8–20)
CALCIUM SERPL-MCNC: 9.2 MG/DL — SIGNIFICANT CHANGE UP (ref 8.4–10.5)
CHLORIDE SERPL-SCNC: 101 MMOL/L — SIGNIFICANT CHANGE UP (ref 98–107)
CO2 SERPL-SCNC: 23 MMOL/L — SIGNIFICANT CHANGE UP (ref 22–29)
CREAT SERPL-MCNC: 2.34 MG/DL — HIGH (ref 0.5–1.3)
EGFR: 32 ML/MIN/1.73M2 — LOW
GAMMA INTERFERON BACKGROUND BLD IA-ACNC: 0.03 IU/ML — SIGNIFICANT CHANGE UP
GLUCOSE BLDC GLUCOMTR-MCNC: 115 MG/DL — HIGH (ref 70–99)
GLUCOSE BLDC GLUCOMTR-MCNC: 131 MG/DL — HIGH (ref 70–99)
GLUCOSE BLDC GLUCOMTR-MCNC: 134 MG/DL — HIGH (ref 70–99)
GLUCOSE BLDC GLUCOMTR-MCNC: 141 MG/DL — HIGH (ref 70–99)
GLUCOSE SERPL-MCNC: 104 MG/DL — HIGH (ref 70–99)
HCT VFR BLD CALC: 22.1 % — LOW (ref 39–50)
HGB BLD-MCNC: 7.1 G/DL — LOW (ref 13–17)
M TB IFN-G BLD-IMP: ABNORMAL
M TB IFN-G CD4+ BCKGRND COR BLD-ACNC: -0.01 IU/ML — SIGNIFICANT CHANGE UP
M TB IFN-G CD4+CD8+ BCKGRND COR BLD-ACNC: -0.01 IU/ML — SIGNIFICANT CHANGE UP
MCHC RBC-ENTMCNC: 29.1 PG — SIGNIFICANT CHANGE UP (ref 27–34)
MCHC RBC-ENTMCNC: 32.1 GM/DL — SIGNIFICANT CHANGE UP (ref 32–36)
MCV RBC AUTO: 90.6 FL — SIGNIFICANT CHANGE UP (ref 80–100)
PLATELET # BLD AUTO: 325 K/UL — SIGNIFICANT CHANGE UP (ref 150–400)
POTASSIUM SERPL-MCNC: 4.4 MMOL/L — SIGNIFICANT CHANGE UP (ref 3.5–5.3)
POTASSIUM SERPL-SCNC: 4.4 MMOL/L — SIGNIFICANT CHANGE UP (ref 3.5–5.3)
QUANT TB PLUS MITOGEN MINUS NIL: 0.04 IU/ML — SIGNIFICANT CHANGE UP
RBC # BLD: 2.44 M/UL — LOW (ref 4.2–5.8)
RBC # FLD: 12.5 % — SIGNIFICANT CHANGE UP (ref 10.3–14.5)
SODIUM SERPL-SCNC: 137 MMOL/L — SIGNIFICANT CHANGE UP (ref 135–145)
WBC # BLD: 11.69 K/UL — HIGH (ref 3.8–10.5)
WBC # FLD AUTO: 11.69 K/UL — HIGH (ref 3.8–10.5)

## 2022-08-29 PROCEDURE — 99233 SBSQ HOSP IP/OBS HIGH 50: CPT

## 2022-08-29 PROCEDURE — 99232 SBSQ HOSP IP/OBS MODERATE 35: CPT

## 2022-08-29 PROCEDURE — 36556 INSERT NON-TUNNEL CV CATH: CPT

## 2022-08-29 PROCEDURE — 76942 ECHO GUIDE FOR BIOPSY: CPT | Mod: 26,59

## 2022-08-29 PROCEDURE — 76937 US GUIDE VASCULAR ACCESS: CPT | Mod: 26,59

## 2022-08-29 RX ADMIN — OXYCODONE AND ACETAMINOPHEN 1 TABLET(S): 5; 325 TABLET ORAL at 07:00

## 2022-08-29 RX ADMIN — HEPARIN SODIUM 5000 UNIT(S): 5000 INJECTION INTRAVENOUS; SUBCUTANEOUS at 21:53

## 2022-08-29 RX ADMIN — OXYCODONE AND ACETAMINOPHEN 1 TABLET(S): 5; 325 TABLET ORAL at 06:30

## 2022-08-29 RX ADMIN — HEPARIN SODIUM 5000 UNIT(S): 5000 INJECTION INTRAVENOUS; SUBCUTANEOUS at 06:30

## 2022-08-29 RX ADMIN — Medication 4 UNIT(S): at 12:30

## 2022-08-29 RX ADMIN — POLYETHYLENE GLYCOL 3350 17 GRAM(S): 17 POWDER, FOR SOLUTION ORAL at 12:32

## 2022-08-29 RX ADMIN — Medication 100 MILLIGRAM(S): at 17:24

## 2022-08-29 RX ADMIN — Medication 4 UNIT(S): at 16:43

## 2022-08-29 RX ADMIN — Medication 100 MILLIGRAM(S): at 06:29

## 2022-08-29 RX ADMIN — OXYCODONE AND ACETAMINOPHEN 1 TABLET(S): 5; 325 TABLET ORAL at 12:33

## 2022-08-29 RX ADMIN — AMLODIPINE BESYLATE 5 MILLIGRAM(S): 2.5 TABLET ORAL at 06:30

## 2022-08-29 RX ADMIN — HEPARIN SODIUM 5000 UNIT(S): 5000 INJECTION INTRAVENOUS; SUBCUTANEOUS at 14:09

## 2022-08-29 RX ADMIN — OXYCODONE AND ACETAMINOPHEN 1 TABLET(S): 5; 325 TABLET ORAL at 23:18

## 2022-08-29 RX ADMIN — Medication 4 UNIT(S): at 08:25

## 2022-08-29 RX ADMIN — OXYCODONE AND ACETAMINOPHEN 1 TABLET(S): 5; 325 TABLET ORAL at 22:06

## 2022-08-29 RX ADMIN — INSULIN GLARGINE 18 UNIT(S): 100 INJECTION, SOLUTION SUBCUTANEOUS at 08:26

## 2022-08-29 NOTE — PROCEDURE NOTE - ADDITIONAL PROCEDURE DETAILS
#18G 10CM   26CIRC BARD POWER GLIDE MIDLINE inserted with ultrasound guidance.   Good flash, ns flush left cephalic vein.   Patient tolerated well.

## 2022-08-29 NOTE — PROGRESS NOTE ADULT - SUBJECTIVE AND OBJECTIVE BOX
HAILEY STEPHIE  ----------------------------------------  The patient was seen at bedside. Patient with bacteremia. Noted some left knee discomfort. Denied chest pain or dyspnea.    Vital Signs Last 24 Hrs  T(C): 37.1 (29 Aug 2022 09:59), Max: 37.7 (29 Aug 2022 05:18)  T(F): 98.8 (29 Aug 2022 09:59), Max: 99.8 (29 Aug 2022 05:18)  HR: 79 (29 Aug 2022 09:59) (79 - 88)  BP: 153/73 (29 Aug 2022 09:59) (139/64 - 165/77)  BP(mean): --  RR: 16 (29 Aug 2022 09:59) (15 - 17)  SpO2: 94% (29 Aug 2022 09:59) (94% - 97%)    Parameters below as of 29 Aug 2022 05:18  Patient On (Oxygen Delivery Method): room air    CAPILLARY BLOOD GLUCOSE  POCT Blood Glucose.: 141 mg/dL (29 Aug 2022 11:33)  POCT Blood Glucose.: 115 mg/dL (29 Aug 2022 07:34)  POCT Blood Glucose.: 178 mg/dL (28 Aug 2022 16:22)    PHYSICAL EXAMINATION:  ----------------------------------------  General appearance: No acute distress, Awake, Alert  HEENT: Normocephalic, Atraumatic, Conjunctiva clear, EOMI  Neck: Supple, No JVD, No tenderness  Lungs: Breath sound equal bilaterally, No wheezes, No rales  Cardiovascular: S1S2, Regular rhythm  Abdomen: Soft, Nontender, Nondistended, No guarding/rebound, Positive bowel sounds  Extremities: No clubbing, No cyanosis, No calf tenderness, Left knee swelling and mild erythema, Left lower extremity edema, Small ulceration to the plantar surface of the right foot, Small ulcerations to the fingers  Neuro: Strength equal bilaterally, No tremors  Psychiatric: Appropriate mood, Normal affect    LABORATORY STUDIES:  ----------------------------------------             7.1    11.69 )-----------( 325      ( 29 Aug 2022 06:01 )             22.1     08-29    137  |  101  |  40.0<H>  ----------------------------<  104<H>  4.4   |  23.0  |  2.34<H>    Ca    9.2      29 Aug 2022 06:01    MEDICATIONS  (STANDING):  amLODIPine   Tablet 5 milliGRAM(s) Oral daily  cadexomer iodine 0.9% Gel 1 Application(s) Topical daily  ceFAZolin   IVPB 2000 milliGRAM(s) IV Intermittent every 12 hours  dextrose 5%. 1000 milliLiter(s) (100 mL/Hr) IV Continuous <Continuous>  dextrose 5%. 1000 milliLiter(s) (50 mL/Hr) IV Continuous <Continuous>  dextrose 50% Injectable 25 Gram(s) IV Push once  dextrose 50% Injectable 12.5 Gram(s) IV Push once  dextrose 50% Injectable 25 Gram(s) IV Push once  glucagon  Injectable 1 milliGRAM(s) IntraMuscular once  heparin   Injectable 5000 Unit(s) SubCutaneous every 8 hours  insulin glargine Injectable (LANTUS) 18 Unit(s) SubCutaneous every morning  insulin lispro (ADMELOG) corrective regimen sliding scale   SubCutaneous three times a day before meals  insulin lispro Injectable (ADMELOG) 4 Unit(s) SubCutaneous three times a day before meals  naloxone Injectable 0.4 milliGRAM(s) IV Push once  polyethylene glycol 3350 17 Gram(s) Oral daily  senna 2 Tablet(s) Oral at bedtime    MEDICATIONS  (PRN):  acetaminophen     Tablet .. 650 milliGRAM(s) Oral every 6 hours PRN Temp greater or equal to 38C (100.4F), Mild Pain (1 - 3)  bisacodyl 5 milliGRAM(s) Oral daily PRN Constipation  dextrose Oral Gel 15 Gram(s) Oral once PRN Blood Glucose LESS THAN 70 milliGRAM(s)/deciliter  morphine  - Injectable 2 milliGRAM(s) IV Push every 4 hours PRN Moderate Pain (4 - 6)  oxycodone    5 mG/acetaminophen 325 mG 1 Tablet(s) Oral every 4 hours PRN Moderate Pain (4 - 6)      ASSESSMENT / PLAN:  ----------------------------------------  57M with a history of diabetes, peripheral neuropathy, and right foot ulcer who presented with left knee pain, fever, and chills. He was found to have bacteremia with cultures growing MSSA for which intravenous antibiotics were continued.    Sepsis / Cellulitis / Bacteremia - MRI of the knee noted effusion but arthrocentesis was not indicative of a septic joint. Repeat blood culture from 8/25 was without growth. On cefazolin. Recurrent fevers noted. Mild leukocytosis which is improved from admission. Fungitell was not elevated. Echocardiogram was without obvious vegetation or valve disease. MRI of the lumbar spine was without infectious process. Rheumatology consultation noted, serology results currently was without obvious findings. Discussed with Infectious Disease, for continuation of intravenous antibiotics at home.    Rhabdomyolysis - CPK was elevated on presentation. Repeat CPK levels now normalized.    Acute kidney injury - Renal function initially improved, now stable. Suspect component of chronic kidney disease from diabetes. Discussed with the patient.    Diabetes - Insulin coverage, close monitoring of blood glucose levels.    Anemia - Monitoring hemoglobin levels. Suspect secondary to chronic disease.    Hypertension - Close blood pressure monitoring.    Hyponatremia - Improved on repeat studies. HAILEY STEPHIE  ----------------------------------------  The patient was seen at bedside. Patient with bacteremia. Noted some left knee discomfort. Denied chest pain or dyspnea.    Vital Signs Last 24 Hrs  T(C): 37.1 (29 Aug 2022 09:59), Max: 37.7 (29 Aug 2022 05:18)  T(F): 98.8 (29 Aug 2022 09:59), Max: 99.8 (29 Aug 2022 05:18)  HR: 79 (29 Aug 2022 09:59) (79 - 88)  BP: 153/73 (29 Aug 2022 09:59) (139/64 - 165/77)  BP(mean): --  RR: 16 (29 Aug 2022 09:59) (15 - 17)  SpO2: 94% (29 Aug 2022 09:59) (94% - 97%)    Parameters below as of 29 Aug 2022 05:18  Patient On (Oxygen Delivery Method): room air    CAPILLARY BLOOD GLUCOSE  POCT Blood Glucose.: 141 mg/dL (29 Aug 2022 11:33)  POCT Blood Glucose.: 115 mg/dL (29 Aug 2022 07:34)  POCT Blood Glucose.: 178 mg/dL (28 Aug 2022 16:22)    PHYSICAL EXAMINATION:  ----------------------------------------  General appearance: No acute distress, Awake, Alert  HEENT: Normocephalic, Atraumatic, Conjunctiva clear, EOMI  Neck: Supple, No JVD, No tenderness  Lungs: Breath sound equal bilaterally, No wheezes, No rales  Cardiovascular: S1S2, Regular rhythm  Abdomen: Soft, Nontender, Nondistended, No guarding/rebound, Positive bowel sounds  Extremities: No clubbing, No cyanosis, No calf tenderness, Left knee swelling and mild erythema, Left lower extremity edema, Small ulceration to the plantar surface of the right foot, Small ulcerations to the fingers  Neuro: Strength equal bilaterally, No tremors  Psychiatric: Appropriate mood, Normal affect    LABORATORY STUDIES:  ----------------------------------------             7.1    11.69 )-----------( 325      ( 29 Aug 2022 06:01 )             22.1     08-29    137  |  101  |  40.0<H>  ----------------------------<  104<H>  4.4   |  23.0  |  2.34<H>    Ca    9.2      29 Aug 2022 06:01    MEDICATIONS  (STANDING):  amLODIPine   Tablet 5 milliGRAM(s) Oral daily  cadexomer iodine 0.9% Gel 1 Application(s) Topical daily  ceFAZolin   IVPB 2000 milliGRAM(s) IV Intermittent every 12 hours  dextrose 5%. 1000 milliLiter(s) (100 mL/Hr) IV Continuous <Continuous>  dextrose 5%. 1000 milliLiter(s) (50 mL/Hr) IV Continuous <Continuous>  dextrose 50% Injectable 25 Gram(s) IV Push once  dextrose 50% Injectable 12.5 Gram(s) IV Push once  dextrose 50% Injectable 25 Gram(s) IV Push once  glucagon  Injectable 1 milliGRAM(s) IntraMuscular once  heparin   Injectable 5000 Unit(s) SubCutaneous every 8 hours  insulin glargine Injectable (LANTUS) 18 Unit(s) SubCutaneous every morning  insulin lispro (ADMELOG) corrective regimen sliding scale   SubCutaneous three times a day before meals  insulin lispro Injectable (ADMELOG) 4 Unit(s) SubCutaneous three times a day before meals  naloxone Injectable 0.4 milliGRAM(s) IV Push once  polyethylene glycol 3350 17 Gram(s) Oral daily  senna 2 Tablet(s) Oral at bedtime    MEDICATIONS  (PRN):  acetaminophen     Tablet .. 650 milliGRAM(s) Oral every 6 hours PRN Temp greater or equal to 38C (100.4F), Mild Pain (1 - 3)  bisacodyl 5 milliGRAM(s) Oral daily PRN Constipation  dextrose Oral Gel 15 Gram(s) Oral once PRN Blood Glucose LESS THAN 70 milliGRAM(s)/deciliter  morphine  - Injectable 2 milliGRAM(s) IV Push every 4 hours PRN Moderate Pain (4 - 6)  oxycodone    5 mG/acetaminophen 325 mG 1 Tablet(s) Oral every 4 hours PRN Moderate Pain (4 - 6)      ASSESSMENT / PLAN:  ----------------------------------------  57M with a history of diabetes, peripheral neuropathy, and right foot ulcer who presented with left knee pain, fever, and chills. He was found to have bacteremia with cultures growing MSSA for which intravenous antibiotics were continued.    Sepsis / Cellulitis / Bacteremia - MRI of the knee noted effusion but arthrocentesis was not indicative of a septic joint. Repeat blood culture from 8/25 was without growth. On cefazolin. Recurrent fevers noted. Mild leukocytosis which is improved from admission. Fungitell was not elevated. Echocardiogram was without obvious vegetation or valve disease. MRI of the lumbar spine was without infectious process. Rheumatology consultation noted, serology results currently was without obvious findings. Discussed with Infectious Disease, for continuation of intravenous antibiotics at home.    Rhabdomyolysis - CPK was elevated on presentation. Repeat CPK levels now normalized.    Acute kidney injury - Renal function initially improved, now stable. Suspect component of chronic kidney disease from diabetes. Discussed with the patient.    Diabetes - Insulin coverage, close monitoring of blood glucose levels.    Anemia - Monitoring hemoglobin levels. Suspect secondary to underlying infectious process and chronic kidney disease.    Hypertension - Close blood pressure monitoring.    Hyponatremia - Improved on repeat studies.

## 2022-08-29 NOTE — PROGRESS NOTE ADULT - ASSESSMENT
57 yr old M w/a PMH of T1DM presents with left knee pain.  At home he takes Tresiba 28 units in the morning and 3-4 units of Humalog with each meal, TID.    1. Controlled T1DM, a1c 6.4%  - Continue Lantus 18 units daily  - Continue Admelog 4 units TID with meals  - SSI    2. Bacteremia/MSSA  - IV abx  - Care per primary team

## 2022-08-29 NOTE — DIETITIAN INITIAL EVALUATION ADULT - OTHER INFO
57M with a history of diabetes, peripheral neuropathy, and right foot ulcer who presented with left knee pain, fever, and chills. He was found to have bacteremia with cultures growing MSSA for which intravenous antibiotics were continued.    Sepsis / Cellulitis - MRI of the knee noted effusion but arthrocentesis was not indicative of a septic joint. Repeat blood culture from 8/25 was without growth. On cefazolin. Recurrent fevers noted. Mild leukocytosis which is improved from admission. Fungitell was not elevated. Echocardiogram was without obvious vegetation or valve disease. MRI of the lumbar spine was without infectious process. Rheumatology consultation noted, serology was without obvious findings.

## 2022-08-29 NOTE — PROGRESS NOTE ADULT - ASSESSMENT
57 yr old M w/a PMH of type 1 DM on Tresiba presents with left knee pain.  Patient states that he woke up on Saturday with significant left knee pain, throughout the day the pain got worse with increased swelling and redness.  He reports difficulty ambulating on the knee and fever and chills. MRI left knee showed large left effusion s/p arthrocentesis with low cell counts. Blood cultures now + for MSsa     MSSA Bacteremia  Fever  Leukocytosis  Left knee effusion  Left leg cellulitis  ESTUARDO/CKD  Sugar Diabetes       Plan:  MSSA bacteremia may be from skin source.   has chronic ulcers in the right foot. also right hand and left hand.     the left knee is inflamed., this may be the source.   repeat blood cultures from 8/25/22 x 2 in process; REMAINS negative         - consider TTE    - left knee arthrocentesis does not appear consistent with septic arthritis, f/u CX. ? reactive arthritis    - f/u tick panel    continue Cefazolin 2 grams Q 12H  thru 9/7/22  midline ordered    Consider discharge to community from ID point of view once antibiotics have been arranged.    patient can follow with  OFFICE in 2 weeks:  Office Contact Information:    =======================================================                   # [place] Office - Appt - Tel  [phone] Fax [phone]                * [place] [facility] - Appt - Tel [phone] Fax [phone]                                  Hospital Consult line:  [phone]  =======================================================

## 2022-08-29 NOTE — DIETITIAN INITIAL EVALUATION ADULT - ORAL INTAKE PTA/DIET HISTORY
good intake PTA, reports he follows carb controlled diet on insulin regimen, past educations with RD regarding diet and diabetes management, declined education at this time
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
## 2022-08-29 NOTE — DIETITIAN INITIAL EVALUATION ADULT - PERTINENT LABORATORY DATA
08-29    137  |  101  |  40.0<H>  ----------------------------<  104<H>  4.4   |  23.0  |  2.34<H>    Ca    9.2      29 Aug 2022 06:01    POCT Blood Glucose.: 115 mg/dL (08-29-22 @ 07:34)  A1C with Estimated Average Glucose Result: 6.4 % (08-22-22 @ 10:40)

## 2022-08-29 NOTE — DIETITIAN INITIAL EVALUATION ADULT - PERTINENT MEDS FT
MEDICATIONS  (STANDING):  amLODIPine   Tablet 5 milliGRAM(s) Oral daily  cadexomer iodine 0.9% Gel 1 Application(s) Topical daily  ceFAZolin   IVPB 2000 milliGRAM(s) IV Intermittent every 12 hours  dextrose 5%. 1000 milliLiter(s) (100 mL/Hr) IV Continuous <Continuous>  dextrose 5%. 1000 milliLiter(s) (50 mL/Hr) IV Continuous <Continuous>  dextrose 50% Injectable 25 Gram(s) IV Push once  dextrose 50% Injectable 12.5 Gram(s) IV Push once  dextrose 50% Injectable 25 Gram(s) IV Push once  glucagon  Injectable 1 milliGRAM(s) IntraMuscular once  heparin   Injectable 5000 Unit(s) SubCutaneous every 8 hours  insulin glargine Injectable (LANTUS) 18 Unit(s) SubCutaneous every morning  insulin lispro (ADMELOG) corrective regimen sliding scale   SubCutaneous three times a day before meals  insulin lispro Injectable (ADMELOG) 4 Unit(s) SubCutaneous three times a day before meals  naloxone Injectable 0.4 milliGRAM(s) IV Push once  polyethylene glycol 3350 17 Gram(s) Oral daily  senna 2 Tablet(s) Oral at bedtime    MEDICATIONS  (PRN):  acetaminophen     Tablet .. 650 milliGRAM(s) Oral every 6 hours PRN Temp greater or equal to 38C (100.4F), Mild Pain (1 - 3)  bisacodyl 5 milliGRAM(s) Oral daily PRN Constipation  dextrose Oral Gel 15 Gram(s) Oral once PRN Blood Glucose LESS THAN 70 milliGRAM(s)/deciliter  morphine  - Injectable 2 milliGRAM(s) IV Push every 4 hours PRN Moderate Pain (4 - 6)  oxycodone    5 mG/acetaminophen 325 mG 1 Tablet(s) Oral every 4 hours PRN Moderate Pain (4 - 6)

## 2022-08-29 NOTE — PROGRESS NOTE ADULT - SUBJECTIVE AND OBJECTIVE BOX
CC: Follow up T1DM management     INTERVAL HPI/OVERNIGHT EVENTS:  C/o left knee and leg pain  Appetite improving    ROS: Patient denies chest pain, SOB, abd pain.    MEDICATIONS  (STANDING):  amLODIPine   Tablet 5 milliGRAM(s) Oral daily  cadexomer iodine 0.9% Gel 1 Application(s) Topical daily  ceFAZolin   IVPB 2000 milliGRAM(s) IV Intermittent every 12 hours  dextrose 5%. 1000 milliLiter(s) (100 mL/Hr) IV Continuous <Continuous>  dextrose 5%. 1000 milliLiter(s) (50 mL/Hr) IV Continuous <Continuous>  dextrose 50% Injectable 25 Gram(s) IV Push once  dextrose 50% Injectable 12.5 Gram(s) IV Push once  dextrose 50% Injectable 25 Gram(s) IV Push once  glucagon  Injectable 1 milliGRAM(s) IntraMuscular once  heparin   Injectable 5000 Unit(s) SubCutaneous every 8 hours  insulin glargine Injectable (LANTUS) 18 Unit(s) SubCutaneous every morning  insulin lispro (ADMELOG) corrective regimen sliding scale   SubCutaneous three times a day before meals  insulin lispro Injectable (ADMELOG) 4 Unit(s) SubCutaneous three times a day before meals  naloxone Injectable 0.4 milliGRAM(s) IV Push once  polyethylene glycol 3350 17 Gram(s) Oral daily  senna 2 Tablet(s) Oral at bedtime    MEDICATIONS  (PRN):  acetaminophen     Tablet .. 650 milliGRAM(s) Oral every 6 hours PRN Temp greater or equal to 38C (100.4F), Mild Pain (1 - 3)  bisacodyl 5 milliGRAM(s) Oral daily PRN Constipation  dextrose Oral Gel 15 Gram(s) Oral once PRN Blood Glucose LESS THAN 70 milliGRAM(s)/deciliter  morphine  - Injectable 2 milliGRAM(s) IV Push every 4 hours PRN Moderate Pain (4 - 6)  oxycodone    5 mG/acetaminophen 325 mG 1 Tablet(s) Oral every 4 hours PRN Moderate Pain (4 - 6)    Allergies  No Known Allergies    Vital Signs Last 24 Hrs  T(C): 37.1 (29 Aug 2022 09:59), Max: 37.7 (29 Aug 2022 05:18)  T(F): 98.8 (29 Aug 2022 09:59), Max: 99.8 (29 Aug 2022 05:18)  HR: 79 (29 Aug 2022 09:59) (79 - 88)  BP: 153/73 (29 Aug 2022 09:59) (139/64 - 165/77)  BP(mean): --  RR: 16 (29 Aug 2022 09:59) (15 - 17)  SpO2: 94% (29 Aug 2022 09:59) (94% - 97%)    Parameters below as of 29 Aug 2022 05:18  Patient On (Oxygen Delivery Method): room air      PHYSICAL EXAM:  General: No apparent distress  Neck: Supple, trachea midline, no thyromegaly  Respiratory: Lungs clear bilaterally, normal rate, effort  Cardiac: +S1, S2, no m/r/g  GI: +BS, soft, non tender, non distended  Extremities: Left knee/leg swelling  Neuro: A+O X3, no tremor    LABS:                        7.1    11.69 )-----------( 325      ( 29 Aug 2022 06:01 )             22.1     08-29    137  |  101  |  40.0<H>  ----------------------------<  104<H>  4.4   |  23.0  |  2.34<H>    Ca    9.2      29 Aug 2022 06:01        POCT Blood Glucose.: 115 mg/dL (08-29-22 @ 07:34)  POCT Blood Glucose.: 178 mg/dL (08-28-22 @ 16:22)  POCT Blood Glucose.: 145 mg/dL (08-28-22 @ 11:45)         CC: Follow up T1DM management     INTERVAL HPI/OVERNIGHT EVENTS:  C/o left knee and leg pain  Appetite improving    ROS: Patient denies chest pain, SOB, abd pain.    MEDICATIONS  (STANDING):  amLODIPine   Tablet 5 milliGRAM(s) Oral daily  cadexomer iodine 0.9% Gel 1 Application(s) Topical daily  ceFAZolin   IVPB 2000 milliGRAM(s) IV Intermittent every 12 hours  dextrose 5%. 1000 milliLiter(s) (100 mL/Hr) IV Continuous <Continuous>  dextrose 5%. 1000 milliLiter(s) (50 mL/Hr) IV Continuous <Continuous>  dextrose 50% Injectable 25 Gram(s) IV Push once  dextrose 50% Injectable 12.5 Gram(s) IV Push once  dextrose 50% Injectable 25 Gram(s) IV Push once  glucagon  Injectable 1 milliGRAM(s) IntraMuscular once  heparin   Injectable 5000 Unit(s) SubCutaneous every 8 hours  insulin glargine Injectable (LANTUS) 18 Unit(s) SubCutaneous every morning  insulin lispro (ADMELOG) corrective regimen sliding scale   SubCutaneous three times a day before meals  insulin lispro Injectable (ADMELOG) 4 Unit(s) SubCutaneous three times a day before meals  naloxone Injectable 0.4 milliGRAM(s) IV Push once  polyethylene glycol 3350 17 Gram(s) Oral daily  senna 2 Tablet(s) Oral at bedtime    MEDICATIONS  (PRN):  acetaminophen     Tablet .. 650 milliGRAM(s) Oral every 6 hours PRN Temp greater or equal to 38C (100.4F), Mild Pain (1 - 3)  bisacodyl 5 milliGRAM(s) Oral daily PRN Constipation  dextrose Oral Gel 15 Gram(s) Oral once PRN Blood Glucose LESS THAN 70 milliGRAM(s)/deciliter  morphine  - Injectable 2 milliGRAM(s) IV Push every 4 hours PRN Moderate Pain (4 - 6)  oxycodone    5 mG/acetaminophen 325 mG 1 Tablet(s) Oral every 4 hours PRN Moderate Pain (4 - 6)    Allergies  No Known Allergies    Vital Signs Last 24 Hrs  T(C): 37.1 (29 Aug 2022 09:59), Max: 37.7 (29 Aug 2022 05:18)  T(F): 98.8 (29 Aug 2022 09:59), Max: 99.8 (29 Aug 2022 05:18)  HR: 79 (29 Aug 2022 09:59) (79 - 88)  BP: 153/73 (29 Aug 2022 09:59) (139/64 - 165/77)  BP(mean): --  RR: 16 (29 Aug 2022 09:59) (15 - 17)  SpO2: 94% (29 Aug 2022 09:59) (94% - 97%)    Parameters below as of 29 Aug 2022 05:18  Patient On (Oxygen Delivery Method): room air      PHYSICAL EXAM:  General: No apparent distress  HEENT:  sclera anicteric, MMM  Neck: Supple, trachea midline, no thyromegaly  Respiratory: Lungs clear bilaterally, normal rate, effort  Cardiac: +S1, S2, no m/r/g  GI: +BS, soft, non tender, non distended  Extremities: Left knee/leg swelling  Skin:  no acanthosis  Neuro: A+O X3, no tremor  Psych: affect appropriate    LABS:                        7.1    11.69 )-----------( 325      ( 29 Aug 2022 06:01 )             22.1     08-29    137  |  101  |  40.0<H>  ----------------------------<  104<H>  4.4   |  23.0  |  2.34<H>    Ca    9.2      29 Aug 2022 06:01        POCT Blood Glucose.: 115 mg/dL (08-29-22 @ 07:34)  POCT Blood Glucose.: 178 mg/dL (08-28-22 @ 16:22)  POCT Blood Glucose.: 145 mg/dL (08-28-22 @ 11:45)

## 2022-08-29 NOTE — PROGRESS NOTE ADULT - SUBJECTIVE AND OBJECTIVE BOX
[facility]                                                INFECTIOUS DISEASES  =======================================================                               Rodger Marino MD#  Hugo Collado MD*                                     Ivon Romano MD*    Faviola Lopez MD*            Diplomates American Board of Internal Medicine & Infectious Diseases                  # [place] Office - Appt - Tel  [phone] Fax [phone]                * [place] Office - Appt - Tel [phone] Fax [phone]                                  Hospital Consult line:  [phone]  =======================================================    [id number]  HAILEY CARD   follow up for: left leg swelling, left knee effusion; MSSA infection    no new issues  MRI of spine negative for infectious focus      I have personally reviewed the labs and data; pertinent labs and data are listed in this note; please see below.   ===================================================  REVIEW OF SYSTEMS:  CONSTITUTIONAL:  No Fever or chills  HEENT:  No diplopia or blurred vision.  No earache, sore throat or runny nose.  CARDIOVASCULAR:  No pressure, squeezing, strangling, tightness, heaviness or aching about the chest, neck, axilla or epigastrium.  RESPIRATORY:  No cough, shortness of breath  GASTROINTESTINAL:  No nausea, vomiting or diarrhea.  GENITOURINARY:  No dysuria, frequency or urgency. No Blood in urine  MUSCULOSKELETAL:  no joint aches, no muscle pain  SKIN:   as per HPI  NEUROLOGIC:  No Headaches, seizures or weakness.  PSYCHIATRIC:  No disorder of thought or mood.  ENDOCRINE:  No heat or cold intolerance  HEMATOLOGICAL:  No easy bruising or bleeding.    =======================================================  Allergies  No Known Allergies     ======================================================  Physical Exam:  ============     General:  No acute distress.  Eye: Pupils are equal, round and reactive to light, Normal conjunctiva.  HENT: Normocephalic, Oral mucosa is moist, No pharyngeal erythema, No sinus tenderness.  Neck: Supple, No lymphadenopathy.  Respiratory: Lungs are clear to auscultation, Respirations are non-labored.  Cardiovascular: Normal rate, Regular rhythm,  s1+s2  Gastrointestinal: Soft, Non-tender, Non-distended, Normal bowel sounds.  Genitourinary: No costovertebral angle tenderness.  Lymphatics: No lymphadenopathy neck,   Musculoskeletal: left knee swelling tenderness; reduced ROM  Integumentary: LLE +pitting edema from lower thigh to calf +erythema over medial aspect of knee,   healed RIGHT plantar foot ulcer,  ulceration of rt thumb and left middle fingers  Neurologic: Alert, Oriented, No focal deficits  Psychiatric: Appropriate mood & affect.  =======================================================     Vitals:  ============  T(F): 98.8 (29 Aug 2022 09:59), Max: 99.8 (29 Aug 2022 05:18)  HR: 79 (29 Aug 2022 09:59)  BP: 153/73 (29 Aug 2022 09:59)  RR: 16 (29 Aug 2022 09:59)  SpO2: 94% (29 Aug 2022 09:59) (94% - 97%)  temp max in last 48H T(F): , Max: 99.8 (08-29-22 @ 05:18)    =======================================================  Current Antibiotics:  ceFAZolin   IVPB 2000 milliGRAM(s) IV Intermittent every 12 hours    Other medications:  amLODIPine   Tablet 5 milliGRAM(s) Oral daily  cadexomer iodine 0.9% Gel 1 Application(s) Topical daily  dextrose 5%. 1000 milliLiter(s) IV Continuous <Continuous>  dextrose 5%. 1000 milliLiter(s) IV Continuous <Continuous>  dextrose 50% Injectable 25 Gram(s) IV Push once  dextrose 50% Injectable 12.5 Gram(s) IV Push once  dextrose 50% Injectable 25 Gram(s) IV Push once  glucagon  Injectable 1 milliGRAM(s) IntraMuscular once  heparin   Injectable 5000 Unit(s) SubCutaneous every 8 hours  insulin glargine Injectable (LANTUS) 18 Unit(s) SubCutaneous every morning  insulin lispro (ADMELOG) corrective regimen sliding scale   SubCutaneous three times a day before meals  insulin lispro Injectable (ADMELOG) 4 Unit(s) SubCutaneous three times a day before meals  naloxone Injectable 0.4 milliGRAM(s) IV Push once  polyethylene glycol 3350 17 Gram(s) Oral daily  senna 2 Tablet(s) Oral at bedtime      =======================================================  Labs:                        7.1    11.69 )-----------( 325      ( 29 Aug 2022 06:01 )             22.1     08-29    137  |  101  |  40.0<H>  ----------------------------<  104<H>  4.4   |  23.0  |  2.34<H>      Creatinine, Serum: 2.34 mg/dL (08-29-22 @ 06:01)  Creatinine, Serum: 2.32 mg/dL (08-28-22 @ 06:30)  Creatinine, Serum: 2.36 mg/dL (08-27-22 @ 05:12)  Creatinine, Serum: 2.39 mg/dL (08-26-22 @ 07:40)  Creatinine, Serum: 2.59 mg/dL (08-25-22 @ 07:00)      Ca    9.2      29 Aug 2022 06:01        Culture - Blood (collected 08-25-22 @ 12:40)  Source: .Blood Blood-Peripheral    Culture - Blood (collected 08-25-22 @ 12:40)  Source: .Blood Blood-Peripheral    Culture - Joint Fluid (collected 08-22-22 @ 02:15)  Source: Knee Left Knee Synovial Fluid  Gram Stain (08-22-22 @ 11:47):    Moderate polymorphonuclear leukocytes per low power field    No organisms seen per oil power field    Culture - Blood (collected 08-21-22 @ 21:30)  Source: .Blood Blood-Peripheral  Gram Stain (08-24-22 @ 09:30):    Growth in anaerobic bottle: Gram Positive Cocci in Clusters  Final Report (08-26-22 @ 12:16):    Growth in anaerobic bottle: Staphylococcus aureus    ***Blood Panel PCR results on this specimen are available    approximately 3 hours after the Gram stain result.***    Gram stain, PCR, and/or culture results may not always    correspond due to difference in methodologies.    ************************************************************    This PCR assay was performed by multiplex PCR. This    Assay tests for 66 bacterial and resistance gene targets.    Please refer to the [facility] Labs test directory    at [email or web address] for details.  Organism: Blood Culture PCR  Staphylococcus aureus (08-26-22 @ 12:16)  Organism: Staphylococcus aureus (08-26-22 @ 12:16)    Sensitivities:      -  Ampicillin/Sulbactam: S <=8/4      -  Cefazolin: S <=4      -  Clindamycin: S <=0.25      -  Erythromycin: S <=0.25      -  Gentamicin: S <=1 Should not be used as monotherapy      -  Oxacillin: S <=0.25 Oxacillin predicts susceptibility for dicloxacillin, methicillin, and nafcillin      -  Rifampin: S <=1 Should not be used as monotherapy      -  Tetra/Doxy: R >8      -  Trimethoprim/Sulfamethoxazole: S <=0.5/9.5      -  Vancomycin: S 1      Method Type: YUN  Organism: Blood Culture PCR (08-26-22 @ 12:16)    Sensitivities:      -  Methicillin SENSITIVE Staphylococcus aureus (MSSA): Detec Any isolate of Staphylococcus aureus from a blood culture is NOT considered a contaminant.      Method Type: PCR    Culture - Blood (collected 08-21-22 @ 21:20)  Source: .Blood Blood-Peripheral  Gram Stain (08-26-22 @ 19:40):    Growth in anaerobic bottle: Gram Positive Cocci in Clusters  Final Report (08-27-22 @ 17:01):    Growth in anaerobic bottle: Staphylococcus aureus    See previous culture [id number]       C-Reactive Protein, Serum: 172 mg/L (08-27-22 @ 05:12)  C-Reactive Protein, Serum: 175 mg/L (08-26-22 @ 07:40)  C-Reactive Protein, Serum: 298 mg/L (08-24-22 @ 06:15)  C-Reactive Protein, Serum: 299 mg/L (08-23-22 @ 09:15)  C-Reactive Protein, Serum: 302 mg/L (08-23-22 @ 02:19)  C-Reactive Protein, Serum: 261 mg/L (08-21-22 @ 21:20)    Sedimentation Rate, Erythrocyte: 68 mm/hr (08-27-22 @ 05:12)  Sedimentation Rate, Erythrocyte: 57 mm/hr (08-21-22 @ 21:20)    Procalcitonin, Serum: 0.76 ng/mL (08-26-22 @ 07:40)  Procalcitonin, Serum: 0.75 ng/mL (08-26-22 @ 07:40)  Procalcitonin, Serum: 2.05 ng/mL (08-24-22 @ 06:15)  Procalcitonin, Serum: 2.83 ng/mL (08-23-22 @ 09:15)    SARS-CoV-2: NotDetec (08-26-22 @ 13:30)  COVID-19 PCR: NotDetec (08-22-22 @ 08:10)      =======================================================       A1C with Estimated Average Glucose Result: 6.4 % (08-22-22 @ 10:40)

## 2022-08-30 ENCOUNTER — TRANSCRIPTION ENCOUNTER (OUTPATIENT)
Age: 57
End: 2022-08-30

## 2022-08-30 VITALS — DIASTOLIC BLOOD PRESSURE: 68 MMHG | HEART RATE: 78 BPM | SYSTOLIC BLOOD PRESSURE: 148 MMHG

## 2022-08-30 LAB
ANION GAP SERPL CALC-SCNC: 13 MMOL/L — SIGNIFICANT CHANGE UP (ref 5–17)
BUN SERPL-MCNC: 36.5 MG/DL — HIGH (ref 8–20)
CALCIUM SERPL-MCNC: 9.3 MG/DL — SIGNIFICANT CHANGE UP (ref 8.4–10.5)
CHLORIDE SERPL-SCNC: 100 MMOL/L — SIGNIFICANT CHANGE UP (ref 98–107)
CO2 SERPL-SCNC: 23 MMOL/L — SIGNIFICANT CHANGE UP (ref 22–29)
CREAT SERPL-MCNC: 2.27 MG/DL — HIGH (ref 0.5–1.3)
CULTURE RESULTS: SIGNIFICANT CHANGE UP
CULTURE RESULTS: SIGNIFICANT CHANGE UP
DSDNA AB SER QL CLIF: NEGATIVE — SIGNIFICANT CHANGE UP
EGFR: 33 ML/MIN/1.73M2 — LOW
GLUCOSE BLDC GLUCOMTR-MCNC: 110 MG/DL — HIGH (ref 70–99)
GLUCOSE BLDC GLUCOMTR-MCNC: 119 MG/DL — HIGH (ref 70–99)
GLUCOSE SERPL-MCNC: 111 MG/DL — HIGH (ref 70–99)
HCT VFR BLD CALC: 23.3 % — LOW (ref 39–50)
HGB BLD-MCNC: 7.6 G/DL — LOW (ref 13–17)
HISTONE AB SER-ACNC: 0.2 UNITS — SIGNIFICANT CHANGE UP (ref 0–0.9)
MCHC RBC-ENTMCNC: 29.5 PG — SIGNIFICANT CHANGE UP (ref 27–34)
MCHC RBC-ENTMCNC: 32.6 GM/DL — SIGNIFICANT CHANGE UP (ref 32–36)
MCV RBC AUTO: 90.3 FL — SIGNIFICANT CHANGE UP (ref 80–100)
PLATELET # BLD AUTO: 395 K/UL — SIGNIFICANT CHANGE UP (ref 150–400)
POTASSIUM SERPL-MCNC: 4.5 MMOL/L — SIGNIFICANT CHANGE UP (ref 3.5–5.3)
POTASSIUM SERPL-SCNC: 4.5 MMOL/L — SIGNIFICANT CHANGE UP (ref 3.5–5.3)
RBC # BLD: 2.58 M/UL — LOW (ref 4.2–5.8)
RBC # FLD: 12.7 % — SIGNIFICANT CHANGE UP (ref 10.3–14.5)
RNAP III AB SER-ACNC: 4 UNITS — SIGNIFICANT CHANGE UP (ref 0–19)
SODIUM SERPL-SCNC: 136 MMOL/L — SIGNIFICANT CHANGE UP (ref 135–145)
SPECIMEN SOURCE: SIGNIFICANT CHANGE UP
SPECIMEN SOURCE: SIGNIFICANT CHANGE UP
WBC # BLD: 11.6 K/UL — HIGH (ref 3.8–10.5)
WBC # FLD AUTO: 11.6 K/UL — HIGH (ref 3.8–10.5)

## 2022-08-30 PROCEDURE — 84466 ASSAY OF TRANSFERRIN: CPT

## 2022-08-30 PROCEDURE — 86480 TB TEST CELL IMMUN MEASURE: CPT

## 2022-08-30 PROCEDURE — 0225U NFCT DS DNA&RNA 21 SARSCOV2: CPT

## 2022-08-30 PROCEDURE — 82962 GLUCOSE BLOOD TEST: CPT

## 2022-08-30 PROCEDURE — 82803 BLOOD GASES ANY COMBINATION: CPT

## 2022-08-30 PROCEDURE — 80048 BASIC METABOLIC PNL TOTAL CA: CPT

## 2022-08-30 PROCEDURE — 71045 X-RAY EXAM CHEST 1 VIEW: CPT

## 2022-08-30 PROCEDURE — 83036 HEMOGLOBIN GLYCOSYLATED A1C: CPT

## 2022-08-30 PROCEDURE — 96375 TX/PRO/DX INJ NEW DRUG ADDON: CPT

## 2022-08-30 PROCEDURE — 89051 BODY FLUID CELL COUNT: CPT

## 2022-08-30 PROCEDURE — 83550 IRON BINDING TEST: CPT

## 2022-08-30 PROCEDURE — U0005: CPT

## 2022-08-30 PROCEDURE — 82435 ASSAY OF BLOOD CHLORIDE: CPT

## 2022-08-30 PROCEDURE — 83605 ASSAY OF LACTIC ACID: CPT

## 2022-08-30 PROCEDURE — 86705 HEP B CORE ANTIBODY IGM: CPT

## 2022-08-30 PROCEDURE — 86038 ANTINUCLEAR ANTIBODIES: CPT

## 2022-08-30 PROCEDURE — 83516 IMMUNOASSAY NONANTIBODY: CPT

## 2022-08-30 PROCEDURE — 86255 FLUORESCENT ANTIBODY SCREEN: CPT

## 2022-08-30 PROCEDURE — 86147 CARDIOLIPIN ANTIBODY EA IG: CPT

## 2022-08-30 PROCEDURE — 84132 ASSAY OF SERUM POTASSIUM: CPT

## 2022-08-30 PROCEDURE — 86780 TREPONEMA PALLIDUM: CPT

## 2022-08-30 PROCEDURE — 85027 COMPLETE CBC AUTOMATED: CPT

## 2022-08-30 PROCEDURE — 86140 C-REACTIVE PROTEIN: CPT

## 2022-08-30 PROCEDURE — 93971 EXTREMITY STUDY: CPT

## 2022-08-30 PROCEDURE — 83520 IMMUNOASSAY QUANT NOS NONAB: CPT

## 2022-08-30 PROCEDURE — 86618 LYME DISEASE ANTIBODY: CPT

## 2022-08-30 PROCEDURE — 36415 COLL VENOUS BLD VENIPUNCTURE: CPT

## 2022-08-30 PROCEDURE — 73562 X-RAY EXAM OF KNEE 3: CPT

## 2022-08-30 PROCEDURE — 85610 PROTHROMBIN TIME: CPT

## 2022-08-30 PROCEDURE — 84145 PROCALCITONIN (PCT): CPT

## 2022-08-30 PROCEDURE — 72148 MRI LUMBAR SPINE W/O DYE: CPT

## 2022-08-30 PROCEDURE — 76882 US LMTD JT/FCL EVL NVASC XTR: CPT

## 2022-08-30 PROCEDURE — 85014 HEMATOCRIT: CPT

## 2022-08-30 PROCEDURE — 86706 HEP B SURFACE ANTIBODY: CPT

## 2022-08-30 PROCEDURE — 99285 EMERGENCY DEPT VISIT HI MDM: CPT | Mod: 25

## 2022-08-30 PROCEDURE — 82947 ASSAY GLUCOSE BLOOD QUANT: CPT

## 2022-08-30 PROCEDURE — 86431 RHEUMATOID FACTOR QUANT: CPT

## 2022-08-30 PROCEDURE — 87591 N.GONORRHOEAE DNA AMP PROB: CPT

## 2022-08-30 PROCEDURE — 86803 HEPATITIS C AB TEST: CPT

## 2022-08-30 PROCEDURE — 85025 COMPLETE CBC W/AUTO DIFF WBC: CPT

## 2022-08-30 PROCEDURE — 86160 COMPLEMENT ANTIGEN: CPT

## 2022-08-30 PROCEDURE — 86235 NUCLEAR ANTIGEN ANTIBODY: CPT

## 2022-08-30 PROCEDURE — 82085 ASSAY OF ALDOLASE: CPT

## 2022-08-30 PROCEDURE — 85730 THROMBOPLASTIN TIME PARTIAL: CPT

## 2022-08-30 PROCEDURE — 82164 ANGIOTENSIN I ENZYME TEST: CPT

## 2022-08-30 PROCEDURE — 84550 ASSAY OF BLOOD/URIC ACID: CPT

## 2022-08-30 PROCEDURE — 87340 HEPATITIS B SURFACE AG IA: CPT

## 2022-08-30 PROCEDURE — 93306 TTE W/DOPPLER COMPLETE: CPT

## 2022-08-30 PROCEDURE — 99232 SBSQ HOSP IP/OBS MODERATE 35: CPT

## 2022-08-30 PROCEDURE — 80202 ASSAY OF VANCOMYCIN: CPT

## 2022-08-30 PROCEDURE — 73721 MRI JNT OF LWR EXTRE W/O DYE: CPT

## 2022-08-30 PROCEDURE — 87150 DNA/RNA AMPLIFIED PROBE: CPT

## 2022-08-30 PROCEDURE — 86200 CCP ANTIBODY: CPT

## 2022-08-30 PROCEDURE — 87491 CHLMYD TRACH DNA AMP PROBE: CPT

## 2022-08-30 PROCEDURE — 87449 NOS EACH ORGANISM AG IA: CPT

## 2022-08-30 PROCEDURE — U0003: CPT

## 2022-08-30 PROCEDURE — 82728 ASSAY OF FERRITIN: CPT

## 2022-08-30 PROCEDURE — 81374 HLA I TYPING 1 ANTIGEN LR: CPT

## 2022-08-30 PROCEDURE — 85652 RBC SED RATE AUTOMATED: CPT

## 2022-08-30 PROCEDURE — 84295 ASSAY OF SERUM SODIUM: CPT

## 2022-08-30 PROCEDURE — 86753 PROTOZOA ANTIBODY NOS: CPT

## 2022-08-30 PROCEDURE — 80053 COMPREHEN METABOLIC PANEL: CPT

## 2022-08-30 PROCEDURE — 82553 CREATINE MB FRACTION: CPT

## 2022-08-30 PROCEDURE — 86666 EHRLICHIA ANTIBODY: CPT

## 2022-08-30 PROCEDURE — 87070 CULTURE OTHR SPECIMN AEROBIC: CPT

## 2022-08-30 PROCEDURE — 73130 X-RAY EXAM OF HAND: CPT

## 2022-08-30 PROCEDURE — 82550 ASSAY OF CK (CPK): CPT

## 2022-08-30 PROCEDURE — 87040 BLOOD CULTURE FOR BACTERIA: CPT

## 2022-08-30 PROCEDURE — 99239 HOSP IP/OBS DSCHRG MGMT >30: CPT

## 2022-08-30 PROCEDURE — 87186 SC STD MICRODIL/AGAR DIL: CPT

## 2022-08-30 PROCEDURE — 83540 ASSAY OF IRON: CPT

## 2022-08-30 PROCEDURE — 85018 HEMOGLOBIN: CPT

## 2022-08-30 PROCEDURE — 73700 CT LOWER EXTREMITY W/O DYE: CPT

## 2022-08-30 PROCEDURE — 82330 ASSAY OF CALCIUM: CPT

## 2022-08-30 PROCEDURE — 89060 EXAM SYNOVIAL FLUID CRYSTALS: CPT

## 2022-08-30 PROCEDURE — 96374 THER/PROPH/DIAG INJ IV PUSH: CPT

## 2022-08-30 PROCEDURE — 87205 SMEAR GRAM STAIN: CPT

## 2022-08-30 PROCEDURE — 87075 CULTR BACTERIA EXCEPT BLOOD: CPT

## 2022-08-30 RX ORDER — POLYETHYLENE GLYCOL 3350 17 G/17G
17 POWDER, FOR SOLUTION ORAL
Qty: 0 | Refills: 0 | DISCHARGE
Start: 2022-08-30

## 2022-08-30 RX ADMIN — HEPARIN SODIUM 5000 UNIT(S): 5000 INJECTION INTRAVENOUS; SUBCUTANEOUS at 13:48

## 2022-08-30 RX ADMIN — Medication 0: at 11:55

## 2022-08-30 RX ADMIN — INSULIN GLARGINE 18 UNIT(S): 100 INJECTION, SOLUTION SUBCUTANEOUS at 08:02

## 2022-08-30 RX ADMIN — Medication 100 MILLIGRAM(S): at 13:48

## 2022-08-30 RX ADMIN — Medication 4 UNIT(S): at 08:02

## 2022-08-30 RX ADMIN — Medication 100 MILLIGRAM(S): at 05:30

## 2022-08-30 RX ADMIN — AMLODIPINE BESYLATE 5 MILLIGRAM(S): 2.5 TABLET ORAL at 05:31

## 2022-08-30 RX ADMIN — OXYCODONE AND ACETAMINOPHEN 1 TABLET(S): 5; 325 TABLET ORAL at 05:53

## 2022-08-30 RX ADMIN — HEPARIN SODIUM 5000 UNIT(S): 5000 INJECTION INTRAVENOUS; SUBCUTANEOUS at 05:30

## 2022-08-30 RX ADMIN — Medication 0: at 08:01

## 2022-08-30 RX ADMIN — Medication 4 UNIT(S): at 11:55

## 2022-08-30 RX ADMIN — POLYETHYLENE GLYCOL 3350 17 GRAM(S): 17 POWDER, FOR SOLUTION ORAL at 11:56

## 2022-08-30 NOTE — DISCHARGE NOTE PROVIDER - HOSPITAL COURSE
57M presented with left knee pain and swelling as well as fevers and chills. On presentation, Temp(101.3), WBC(17.52), H/H(10.2/28.9), Na(127), BUN/Cr(42.7/2.97). The patient was seen by Orthopedic Surgery in consultation and underwent arthrocentesis of the left knee. Fluid analysis was not consistent of septic joint. The patient was seen by Endocrine in consultation and the insulin regimen was adjusted. The patient was seen by Podiatry in consultation for a right foot ulcer and underwent bedside debridement. MRI of the left knee noted a large effusion, tiny focus of bone marrow edema at the quadriceps tendon insertion at the superior pole of the patella, no fracture or bone marrow edema, normal alignment, prominent muscle edema involving the semimembranosus and gastrocnemius muscles, no acute ligamentous injury. The patient was seen by Infectious Disease in consultation and continued on intravenous antibiotics. The patient was seen by Rheumatology in consultation and serologies were requested for possible arthritis. Blood cultures grew MSSA. Left lower extremity doppler was without deep venous thrombosis. Lower extremity ultrasound noted a trace to small left popliteal fossa cyst, small to mild complex suprapatellar effusion. Synovial fluid culture was without growth. CT of the left knee noted a moderate size joint effusion with surrounding subcutaneous edema, no erosions. Repeat blood cultures were without growth. Echocardiogram noted normal global left ventricular systolic function, ejection fraction of 55 to 60%, trivial pericardial effusion, no obvious vegetation. The patient was thought to require continued intravenous antibiotics and a midline was placed. The patient was discharged home with instructions to follow up with his primary care physician, endocrinologist, and Infectious Disease for further management.        35 minutes total time

## 2022-08-30 NOTE — DISCHARGE NOTE NURSING/CASE MANAGEMENT/SOCIAL WORK - PATIENT PORTAL LINK FT
You can access the FollowMyHealth Patient Portal offered by [facility] by registering at the following website: [email or web address]. By joining [employer]’s FollowMyHealth portal, you will also be able to view your health information using other applications (apps) compatible with our system.

## 2022-08-30 NOTE — DISCHARGE NOTE NURSING/CASE MANAGEMENT/SOCIAL WORK - NSDCPEFALRISK_GEN_ALL_CORE
For information on Fall & Injury Prevention, visit: [email or web address] OR  [email or web address] OR  https://www.cdc.gov/steadi/patient.html

## 2022-08-30 NOTE — PROGRESS NOTE ADULT - ASSESSMENT
57 yr old M w/a PMH of T1DM presents with left knee pain.  At home he takes Tresiba 28 units in the morning and 3-4 units of Humalog with each meal, TID.    1. Controlled T1DM, a1c 6.4%  - Continue Lantus 18 units daily  - Continue Admelog 4 units TID with meals  - SSI  - Can discharge on current rx  - Needs new glucometer and testing strips for home  - Will f/u with our office. Instructed to call office if pt starts to have hypoglycemia/hyperlgycemia    2. Bacteremia/MSSA  - IV abx  - Care per primary team/ID

## 2022-08-30 NOTE — PROGRESS NOTE ADULT - SUBJECTIVE AND OBJECTIVE BOX
HAILEY CARD  ----------------------------------------  The patient was seen at bedside. Patient with cellulitis and bacteremia. Noted some improvement in the left leg swelling.    Vital Signs Last 24 Hrs  T(C): 36.7 (30 Aug 2022 10:07), Max: 37.7 (29 Aug 2022 21:21)  T(F): 98 (30 Aug 2022 10:07), Max: 99.9 (29 Aug 2022 21:21)  HR: 72 (30 Aug 2022 10:07) (72 - 85)  BP: 131/75 (30 Aug 2022 10:07) (131/75 - 155/68)  BP(mean): --  RR: 18 (30 Aug 2022 10:07) (18 - 19)  SpO2: 98% (30 Aug 2022 10:07) (95% - 98%)    Parameters below as of 30 Aug 2022 10:07  Patient On (Oxygen Delivery Method): room air    CAPILLARY BLOOD GLUCOSE  POCT Blood Glucose.: 119 mg/dL (30 Aug 2022 11:08)  POCT Blood Glucose.: 110 mg/dL (30 Aug 2022 07:12)  POCT Blood Glucose.: 131 mg/dL (29 Aug 2022 21:57)  POCT Blood Glucose.: 134 mg/dL (29 Aug 2022 16:33)    PHYSICAL EXAMINATION:  ----------------------------------------  General appearance: No acute distress, Awake, Alert  HEENT: Normocephalic, Atraumatic, Conjunctiva clear, EOMI  Neck: Supple, No JVD, No tenderness  Lungs: Breath sound equal bilaterally, No wheezes, No rales  Cardiovascular: S1S2, Regular rhythm  Abdomen: Soft, Nontender, Nondistended, No guarding/rebound, Positive bowel sounds  Extremities: No clubbing, No cyanosis, No calf tenderness, Left knee swelling and mild erythema, Left lower extremity edema, Small ulceration to the plantar surface of the right foot, Small ulcerations to the fingers  Neuro: Strength equal bilaterally, No tremors  Psychiatric: Appropriate mood, Normal affect    LABORATORY STUDIES:  ----------------------------------------             7.6    11.60 )-----------( 395      ( 30 Aug 2022 05:58 )             23.3     08-30    136  |  100  |  36.5<H>  ----------------------------<  111<H>  4.5   |  23.0  |  2.27<H>    Ca    9.3      30 Aug 2022 05:58    MEDICATIONS  (STANDING):  amLODIPine   Tablet 5 milliGRAM(s) Oral daily  cadexomer iodine 0.9% Gel 1 Application(s) Topical daily  ceFAZolin   IVPB 2000 milliGRAM(s) IV Intermittent every 12 hours  dextrose 5%. 1000 milliLiter(s) (100 mL/Hr) IV Continuous <Continuous>  dextrose 5%. 1000 milliLiter(s) (50 mL/Hr) IV Continuous <Continuous>  dextrose 50% Injectable 25 Gram(s) IV Push once  dextrose 50% Injectable 12.5 Gram(s) IV Push once  dextrose 50% Injectable 25 Gram(s) IV Push once  glucagon  Injectable 1 milliGRAM(s) IntraMuscular once  heparin   Injectable 5000 Unit(s) SubCutaneous every 8 hours  insulin glargine Injectable (LANTUS) 18 Unit(s) SubCutaneous every morning  insulin lispro (ADMELOG) corrective regimen sliding scale   SubCutaneous three times a day before meals  insulin lispro Injectable (ADMELOG) 4 Unit(s) SubCutaneous three times a day before meals  naloxone Injectable 0.4 milliGRAM(s) IV Push once  polyethylene glycol 3350 17 Gram(s) Oral daily  senna 2 Tablet(s) Oral at bedtime    MEDICATIONS  (PRN):  acetaminophen     Tablet .. 650 milliGRAM(s) Oral every 6 hours PRN Temp greater or equal to 38C (100.4F), Mild Pain (1 - 3)  bisacodyl 5 milliGRAM(s) Oral daily PRN Constipation  dextrose Oral Gel 15 Gram(s) Oral once PRN Blood Glucose LESS THAN 70 milliGRAM(s)/deciliter  oxycodone    5 mG/acetaminophen 325 mG 1 Tablet(s) Oral every 4 hours PRN Moderate Pain (4 - 6)      ASSESSMENT / PLAN:  ----------------------------------------  57M with a history of diabetes, peripheral neuropathy, and right foot ulcer who presented with left knee pain, fever, and chills. He was found to have bacteremia with cultures growing MSSA for which intravenous antibiotics were continued.    Sepsis / Cellulitis / Bacteremia - MRI of the knee noted effusion but arthrocentesis was not indicative of a septic joint. Repeat blood culture from 8/25 was without growth. On cefazolin. Mild leukocytosis which is improved from admission. Afebrile. Fungitell was not elevated. Echocardiogram was without obvious vegetation or valve disease. MRI of the lumbar spine was without infectious process. Rheumatology consultation noted, serology results currently was without obvious findings. For continuation of intravenous antibiotics at home.    Rhabdomyolysis - CPK was elevated on presentation and normalized on repeat studies.    Acute kidney injury - Renal function initially improved, now stable. Suspect component of chronic kidney disease from diabetes. Discussed with the patient.    Diabetes - Insulin coverage, close monitoring of blood glucose levels. Endocrine follow up noted.    Anemia - Monitoring hemoglobin levels. Suspect secondary to underlying infectious process and chronic kidney disease.    Hypertension - Close blood pressure monitoring.    Hyponatremia - Improved on repeat studies.

## 2022-08-30 NOTE — PROGRESS NOTE ADULT - NS ATTEND OPT1 GEN_ALL_CORE
I independently performed the documented:

## 2022-08-30 NOTE — DISCHARGE NOTE PROVIDER - CARE PROVIDERS DIRECT ADDRESSES
[email or web address]

## 2022-08-30 NOTE — DISCHARGE NOTE PROVIDER - CARE PROVIDER_API CALL
[place] [address]  Phone: [phone]  Fax: [phone]  Follow Up Time:     Hugo Collado)  Infectious Disease; Internal Medicine  [address] [place] [address]  Phone: [phone]  Fax: [phone]  Follow Up Time:

## 2022-08-30 NOTE — PROGRESS NOTE ADULT - NS ATTEND AMEND GEN_ALL_CORE FT
Patient seen and examined on morning rounds.    57 year old male with T1DM admitted with MSSA bacteremia, likely from skin source on LLE.  His glycemic control is currently adequate on basal bolus insulin regimen.     Would plan to discharge patient on current insulin regimen with follow up as an outpatient with Dr. Vega.
Pt seen and examined    still with knee pain  Heart RRR   Lung CTAb   EXt  left leg with erythema  about 2 inches above knee to ankle          DM   cont RX   controlled     ESTUARDO       ?septic joint
Follow up T1DM   Pt  withfever and chills   to go for sono     Heart RRR    Lung CTAB    EXt   left leg with erythema and warmth     T1DM    cont basl bolus      foot ulcers- cont RX with Ppodaitry   FInger with ulcer  ? autoimmune    pulses in wrist are good
Pt seen and examined at bedside   pt reports that knee was feelign worse earlier-- pain meds have helped this afternoon--was able to eat lunch   c  Still with fevers     Heart RRR   Lung CTAb   Ext Left leg with swellign and  erythema around knee       Imp  T1DM  reduce Lantus to 22 untis as pt reports to have had decreased appetite    ESTUARDO - creatinine improving    Cellulitis  ? abscess formaiton dw ppmd and ortho-  for CT of leg noncontrast   may need genreal surgery eval if has abscess   if   WBC trending downaard    ID on board    c tick panel pending
Patient seen and examined on AM rounds.     57 year old male with Type 1 DM with nephropathy admitted with left knee effusion found to have MSSA bacteremia (likely skin source) although arthrocentesis is not indicative of septic joint.  His diabetes is currently well controlled.      As above, continue current basal bolus insulin regimen.  Creatinine level is stable, likely has chronic CKD.  BP well controlled on Amlodipine.
Pt seesn and examined at bedside   Pt reports knee pain still  some hypoglcyemia last ngiht   pt reports when in pain - cannot eat   HEart  RRR   LUNG CTAb   ext  left leg less erythematous  still swollen   ulcers on fingers    Imp T1DM   decrease Lantus to 18  dc premeal Admelog bc of poor appetite  cotncoverage scale    ulcers on right hand- consdier derm eval for biopsy   as pt was told to see Derm when these lesions appeared    knee- CT scan- no fluid collection to suggest abdscess  MSSA- to get TTE  and imaging     for PICC Line

## 2022-08-30 NOTE — DISCHARGE NOTE PROVIDER - NSDCCPCAREPLAN_GEN_ALL_CORE_FT
PRINCIPAL DISCHARGE DIAGNOSIS  Diagnosis: Sepsis  Assessment and Plan of Treatment: Continue on the intravenous antibiotics. Follow up with Infectious Disease. Take the pain medications as needed.      SECONDARY DISCHARGE DIAGNOSES  Diagnosis: Diabetes  Assessment and Plan of Treatment: Continue on your insulin regimen at home. Follow up with your endocrinologist for further management.    Diagnosis: Chronic kidney disease, unspecified CKD stage  Assessment and Plan of Treatment: Follow up with your primary care physician for further management.

## 2022-08-30 NOTE — PROGRESS NOTE ADULT - SUBJECTIVE AND OBJECTIVE BOX
CC: Follow up T1DM management     INTERVAL HPI/OVERNIGHT EVENTS:  No acute events, d/c today on IV abx    ROS: Patient denies chest pain, SOB, abd pain. + left knee and leg pain but slowly improving    MEDICATIONS  (STANDING):  amLODIPine   Tablet 5 milliGRAM(s) Oral daily  cadexomer iodine 0.9% Gel 1 Application(s) Topical daily  ceFAZolin   IVPB 2000 milliGRAM(s) IV Intermittent every 12 hours  dextrose 5%. 1000 milliLiter(s) (100 mL/Hr) IV Continuous <Continuous>  dextrose 5%. 1000 milliLiter(s) (50 mL/Hr) IV Continuous <Continuous>  dextrose 50% Injectable 25 Gram(s) IV Push once  dextrose 50% Injectable 12.5 Gram(s) IV Push once  dextrose 50% Injectable 25 Gram(s) IV Push once  glucagon  Injectable 1 milliGRAM(s) IntraMuscular once  heparin   Injectable 5000 Unit(s) SubCutaneous every 8 hours  insulin glargine Injectable (LANTUS) 18 Unit(s) SubCutaneous every morning  insulin lispro (ADMELOG) corrective regimen sliding scale   SubCutaneous three times a day before meals  insulin lispro Injectable (ADMELOG) 4 Unit(s) SubCutaneous three times a day before meals  naloxone Injectable 0.4 milliGRAM(s) IV Push once  polyethylene glycol 3350 17 Gram(s) Oral daily  senna 2 Tablet(s) Oral at bedtime    MEDICATIONS  (PRN):  acetaminophen     Tablet .. 650 milliGRAM(s) Oral every 6 hours PRN Temp greater or equal to 38C (100.4F), Mild Pain (1 - 3)  bisacodyl 5 milliGRAM(s) Oral daily PRN Constipation  dextrose Oral Gel 15 Gram(s) Oral once PRN Blood Glucose LESS THAN 70 milliGRAM(s)/deciliter  oxycodone    5 mG/acetaminophen 325 mG 1 Tablet(s) Oral every 4 hours PRN Moderate Pain (4 - 6)    Allergies  No Known Allergies    Vital Signs Last 24 Hrs  T(C): 36.7 (30 Aug 2022 10:07), Max: 37.7 (29 Aug 2022 21:21)  T(F): 98 (30 Aug 2022 10:07), Max: 99.9 (29 Aug 2022 21:21)  HR: 72 (30 Aug 2022 10:07) (72 - 85)  BP: 131/75 (30 Aug 2022 10:07) (131/75 - 155/68)  BP(mean): --  RR: 18 (30 Aug 2022 10:07) (18 - 19)  SpO2: 98% (30 Aug 2022 10:07) (95% - 98%)    Parameters below as of 30 Aug 2022 10:07  Patient On (Oxygen Delivery Method): room air      PHYSICAL EXAM:  General: No apparent distress  Neck: Supple, trachea midline, no thyromegaly  HEENT:  Sclera anicteric, MMM  Respiratory: Lungs clear bilaterally, normal rate, effort  Cardiac: +S1, S2, no m/r/g  GI: +BS, soft, non tender, non distended  Extremities: No peripheral edema, no pedal lesions  Neuro: A+O X3, no tremor  Pysch: Affect appropriate   Skin: No acanthosis      LABS:                        7.6    11.60 )-----------( 395      ( 30 Aug 2022 05:58 )             23.3     08-30    136  |  100  |  36.5<H>  ----------------------------<  111<H>  4.5   |  23.0  |  2.27<H>    Ca    9.3      30 Aug 2022 05:58      POCT Blood Glucose.: 110 mg/dL (08-30-22 @ 07:12)  POCT Blood Glucose.: 131 mg/dL (08-29-22 @ 21:57)  POCT Blood Glucose.: 134 mg/dL (08-29-22 @ 16:33)  POCT Blood Glucose.: 141 mg/dL (08-29-22 @ 11:33)         CC: Follow up T1DM management     INTERVAL HPI/OVERNIGHT EVENTS:  No acute events, d/c today on IV abx    ROS: Patient denies chest pain, SOB, abd pain. + left knee and leg pain but slowly improving    MEDICATIONS  (STANDING):  amLODIPine   Tablet 5 milliGRAM(s) Oral daily  cadexomer iodine 0.9% Gel 1 Application(s) Topical daily  ceFAZolin   IVPB 2000 milliGRAM(s) IV Intermittent every 12 hours  dextrose 5%. 1000 milliLiter(s) (100 mL/Hr) IV Continuous <Continuous>  dextrose 5%. 1000 milliLiter(s) (50 mL/Hr) IV Continuous <Continuous>  dextrose 50% Injectable 25 Gram(s) IV Push once  dextrose 50% Injectable 12.5 Gram(s) IV Push once  dextrose 50% Injectable 25 Gram(s) IV Push once  glucagon  Injectable 1 milliGRAM(s) IntraMuscular once  heparin   Injectable 5000 Unit(s) SubCutaneous every 8 hours  insulin glargine Injectable (LANTUS) 18 Unit(s) SubCutaneous every morning  insulin lispro (ADMELOG) corrective regimen sliding scale   SubCutaneous three times a day before meals  insulin lispro Injectable (ADMELOG) 4 Unit(s) SubCutaneous three times a day before meals  naloxone Injectable 0.4 milliGRAM(s) IV Push once  polyethylene glycol 3350 17 Gram(s) Oral daily  senna 2 Tablet(s) Oral at bedtime    MEDICATIONS  (PRN):  acetaminophen     Tablet .. 650 milliGRAM(s) Oral every 6 hours PRN Temp greater or equal to 38C (100.4F), Mild Pain (1 - 3)  bisacodyl 5 milliGRAM(s) Oral daily PRN Constipation  dextrose Oral Gel 15 Gram(s) Oral once PRN Blood Glucose LESS THAN 70 milliGRAM(s)/deciliter  oxycodone    5 mG/acetaminophen 325 mG 1 Tablet(s) Oral every 4 hours PRN Moderate Pain (4 - 6)    Allergies  No Known Allergies    Vital Signs Last 24 Hrs  T(C): 36.7 (30 Aug 2022 10:07), Max: 37.7 (29 Aug 2022 21:21)  T(F): 98 (30 Aug 2022 10:07), Max: 99.9 (29 Aug 2022 21:21)  HR: 72 (30 Aug 2022 10:07) (72 - 85)  BP: 131/75 (30 Aug 2022 10:07) (131/75 - 155/68)  BP(mean): --  RR: 18 (30 Aug 2022 10:07) (18 - 19)  SpO2: 98% (30 Aug 2022 10:07) (95% - 98%)    Parameters below as of 30 Aug 2022 10:07  Patient On (Oxygen Delivery Method): room air      PHYSICAL EXAM:  General: No apparent distress  Neck: Supple, trachea midline, no thyromegaly  HEENT:  Sclera anicteric, MMM  Respiratory: Lungs clear bilaterally, normal rate, effort  Cardiac: +S1, S2, no m/r/g  GI: +BS, soft, non tender, non distended  Extremities: Left knee/leg swelling  Neuro: A+O X3, no tremor  Pysch: Affect appropriate   Skin: No acanthosis      LABS:                        7.6    11.60 )-----------( 395      ( 30 Aug 2022 05:58 )             23.3     08-30    136  |  100  |  36.5<H>  ----------------------------<  111<H>  4.5   |  23.0  |  2.27<H>    Ca    9.3      30 Aug 2022 05:58      POCT Blood Glucose.: 110 mg/dL (08-30-22 @ 07:12)  POCT Blood Glucose.: 131 mg/dL (08-29-22 @ 21:57)  POCT Blood Glucose.: 134 mg/dL (08-29-22 @ 16:33)  POCT Blood Glucose.: 141 mg/dL (08-29-22 @ 11:33)

## 2022-08-30 NOTE — PROGRESS NOTE ADULT - PROVIDER SPECIALTY LIST ADULT
Endocrinology
Endocrinology
Hospitalist
Infectious Disease
Endocrinology
Hospitalist
Infectious Disease
Infectious Disease
Podiatry
Endocrinology
Infectious Disease
Endocrinology
Endocrinology
Hospitalist
Hospitalist

## 2022-08-30 NOTE — DISCHARGE NOTE PROVIDER - ATTENDING DISCHARGE PHYSICAL EXAMINATION:
General appearance: No acute distress, Awake, Alert  Lungs: Clear to auscultation, Breath sound equal bilaterally  Cardiovascular: S1S2, Regular rhythm  Abdomen: Soft, Nontender, Positive bowel sounds  Extremities: No clubbing, No cyanosis, Left leg swelling

## 2022-08-30 NOTE — PROGRESS NOTE ADULT - REASON FOR ADMISSION
Knee pain

## 2022-08-30 NOTE — DISCHARGE NOTE PROVIDER - NSDCMRMEDTOKEN_GEN_ALL_CORE_FT
ceFAZolin 2 g injection: 2 gram(s) intravenously every 12 hours MDD:thru 9/7/22,  CBC CMP 9/2/22; then pull line  Freestyle lite glucometer:   Glucometer Test Strips as per insurance: 1 each  4 times a day   HumaLOG 100 units/mL injectable solution: 4 unit(s) injectable 3 times a day (before meals)  oxycodone-acetaminophen 5 mg-325 mg oral tablet: 1 tab(s) orally every 6 hours, As Needed -Moderate Pain (4 - 6) MDD:4 tabs  polyethylene glycol 3350 oral powder for reconstitution: 17 gram(s) orally once a day  Tresiba 100 units/mL subcutaneous solution: 28 unit(s) subcutaneous once a day

## 2022-08-31 LAB
GAMMA INTERFERON BACKGROUND BLD IA-ACNC: 0.02 IU/ML — SIGNIFICANT CHANGE UP
M TB IFN-G BLD-IMP: ABNORMAL
M TB IFN-G CD4+ BCKGRND COR BLD-ACNC: -0.01 IU/ML — SIGNIFICANT CHANGE UP
M TB IFN-G CD4+CD8+ BCKGRND COR BLD-ACNC: -0.01 IU/ML — SIGNIFICANT CHANGE UP
QUANT TB PLUS MITOGEN MINUS NIL: 0.02 IU/ML — SIGNIFICANT CHANGE UP

## 2022-09-02 ENCOUNTER — NON-APPOINTMENT (OUTPATIENT)
Age: 57
End: 2022-09-02

## 2022-09-02 LAB — PM/SCL-100 AB SER LINE BLOT-ACNC: <20 UNITS — SIGNIFICANT CHANGE UP

## 2022-09-03 ENCOUNTER — INPATIENT (INPATIENT)
Facility: HOSPITAL | Age: 57
LOS: 4 days | Discharge: HOME | DRG: 291 | End: 2022-09-08
Attending: FAMILY MEDICINE | Admitting: HOSPITALIST
Payer: COMMERCIAL

## 2022-09-03 VITALS
SYSTOLIC BLOOD PRESSURE: 128 MMHG | HEART RATE: 88 BPM | DIASTOLIC BLOOD PRESSURE: 76 MMHG | HEIGHT: 72 IN | OXYGEN SATURATION: 99 % | TEMPERATURE: 98 F | RESPIRATION RATE: 16 BRPM | WEIGHT: 214.07 LBS

## 2022-09-03 DIAGNOSIS — Z90.49 ACQUIRED ABSENCE OF OTHER SPECIFIED PARTS OF DIGESTIVE TRACT: Chronic | ICD-10-CM

## 2022-09-03 LAB
ALBUMIN SERPL ELPH-MCNC: 2.7 G/DL — LOW (ref 3.3–5.2)
ALP SERPL-CCNC: 335 U/L — HIGH (ref 40–120)
ALT FLD-CCNC: 9 U/L — SIGNIFICANT CHANGE UP
ANION GAP SERPL CALC-SCNC: 10 MMOL/L — SIGNIFICANT CHANGE UP (ref 5–17)
APTT BLD: 30.1 SEC — SIGNIFICANT CHANGE UP (ref 27.5–35.5)
AST SERPL-CCNC: 35 U/L — SIGNIFICANT CHANGE UP
BASOPHILS # BLD AUTO: 0.03 K/UL — SIGNIFICANT CHANGE UP (ref 0–0.2)
BASOPHILS NFR BLD AUTO: 0.2 % — SIGNIFICANT CHANGE UP (ref 0–2)
BILIRUB SERPL-MCNC: 0.3 MG/DL — LOW (ref 0.4–2)
BUN SERPL-MCNC: 33.3 MG/DL — HIGH (ref 8–20)
CALCIUM SERPL-MCNC: 9.5 MG/DL — SIGNIFICANT CHANGE UP (ref 8.4–10.5)
CHLORIDE SERPL-SCNC: 100 MMOL/L — SIGNIFICANT CHANGE UP (ref 98–107)
CK MB CFR SERPL CALC: 5 NG/ML — SIGNIFICANT CHANGE UP (ref 0–6.7)
CK SERPL-CCNC: 245 U/L — HIGH (ref 30–200)
CO2 SERPL-SCNC: 25 MMOL/L — SIGNIFICANT CHANGE UP (ref 22–29)
CREAT SERPL-MCNC: 2.07 MG/DL — HIGH (ref 0.5–1.3)
EGFR: 37 ML/MIN/1.73M2 — LOW
EOSINOPHIL # BLD AUTO: 0.27 K/UL — SIGNIFICANT CHANGE UP (ref 0–0.5)
EOSINOPHIL NFR BLD AUTO: 2.1 % — SIGNIFICANT CHANGE UP (ref 0–6)
GLUCOSE SERPL-MCNC: 113 MG/DL — HIGH (ref 70–99)
HCT VFR BLD CALC: 22.7 % — LOW (ref 39–50)
HGB BLD-MCNC: 7.4 G/DL — LOW (ref 13–17)
HLA-B27 QL: NEGATIVE — SIGNIFICANT CHANGE UP
IMM GRANULOCYTES NFR BLD AUTO: 0.5 % — SIGNIFICANT CHANGE UP (ref 0–1.5)
INR BLD: 1.07 RATIO — SIGNIFICANT CHANGE UP (ref 0.88–1.16)
LYMPHOCYTES # BLD AUTO: 0.8 K/UL — LOW (ref 1–3.3)
LYMPHOCYTES # BLD AUTO: 6.3 % — LOW (ref 13–44)
MAGNESIUM SERPL-MCNC: 1.7 MG/DL — LOW (ref 1.8–2.6)
MCHC RBC-ENTMCNC: 29.5 PG — SIGNIFICANT CHANGE UP (ref 27–34)
MCHC RBC-ENTMCNC: 32.6 GM/DL — SIGNIFICANT CHANGE UP (ref 32–36)
MCV RBC AUTO: 90.4 FL — SIGNIFICANT CHANGE UP (ref 80–100)
MONOCYTES # BLD AUTO: 0.7 K/UL — SIGNIFICANT CHANGE UP (ref 0–0.9)
MONOCYTES NFR BLD AUTO: 5.5 % — SIGNIFICANT CHANGE UP (ref 2–14)
NEUTROPHILS # BLD AUTO: 10.84 K/UL — HIGH (ref 1.8–7.4)
NEUTROPHILS NFR BLD AUTO: 85.4 % — HIGH (ref 43–77)
NT-PROBNP SERPL-SCNC: 4203 PG/ML — HIGH (ref 0–300)
PLATELET # BLD AUTO: 386 K/UL — SIGNIFICANT CHANGE UP (ref 150–400)
POTASSIUM SERPL-MCNC: 5.1 MMOL/L — SIGNIFICANT CHANGE UP (ref 3.5–5.3)
POTASSIUM SERPL-SCNC: 5.1 MMOL/L — SIGNIFICANT CHANGE UP (ref 3.5–5.3)
PROT SERPL-MCNC: 6 G/DL — LOW (ref 6.6–8.7)
PROTHROM AB SERPL-ACNC: 12.4 SEC — SIGNIFICANT CHANGE UP (ref 10.5–13.4)
RBC # BLD: 2.51 M/UL — LOW (ref 4.2–5.8)
RBC # FLD: 12.6 % — SIGNIFICANT CHANGE UP (ref 10.3–14.5)
SODIUM SERPL-SCNC: 135 MMOL/L — SIGNIFICANT CHANGE UP (ref 135–145)
WBC # BLD: 12.7 K/UL — HIGH (ref 3.8–10.5)
WBC # FLD AUTO: 12.7 K/UL — HIGH (ref 3.8–10.5)

## 2022-09-03 PROCEDURE — 71045 X-RAY EXAM CHEST 1 VIEW: CPT | Mod: 26

## 2022-09-03 PROCEDURE — 93970 EXTREMITY STUDY: CPT | Mod: 26

## 2022-09-03 PROCEDURE — 99285 EMERGENCY DEPT VISIT HI MDM: CPT

## 2022-09-03 RX ORDER — FUROSEMIDE 40 MG
40 TABLET ORAL ONCE
Refills: 0 | Status: COMPLETED | OUTPATIENT
Start: 2022-09-03 | End: 2022-09-03

## 2022-09-03 RX ORDER — CEFAZOLIN SODIUM 1 G
2000 VIAL (EA) INJECTION ONCE
Refills: 0 | Status: COMPLETED | OUTPATIENT
Start: 2022-09-03 | End: 2022-09-03

## 2022-09-03 RX ORDER — MAGNESIUM OXIDE 400 MG ORAL TABLET 241.3 MG
400 TABLET ORAL ONCE
Refills: 0 | Status: COMPLETED | OUTPATIENT
Start: 2022-09-03 | End: 2022-09-03

## 2022-09-03 NOTE — ED ADULT TRIAGE NOTE - CHIEF COMPLAINT QUOTE
Pt with midline in E for left knee infection. Pt was admitted here for 10 days and now has been home on antibiotics. Pt is coming back in today with b/l lower extremity edema and SOB with exertion.

## 2022-09-03 NOTE — ED PROVIDER NOTE - PHYSICAL EXAMINATION
Constitutional: Awake, alert, in no acute distress  Eyes: no scleral icterus  HENT: normocephalic, atraumatic, moist oral mucosa  Neck: supple  CV: RRR, no murmur, 2+ distal pulses in all extremities  Pulm: non-labored respirations, +diminished breath sounds at bases  Abdomen: soft, non-tender, non-distended  Extremities: +b/l lower extremity pitting edema, +swelling of L knee with minimal erythema, no deformity  Skin: no rash, no jaundice  Neuro: AAOx3, moving all extremities equally

## 2022-09-03 NOTE — ED ADULT NURSE REASSESSMENT NOTE - NS ED NURSE REASSESS COMMENT FT1
Assumed care of patient at approx 19:15. Patient AxOx4.  Patient denies any pain/discomfort, denies CP/SOB. Patient updated on the plan of care.  Patient offers no complaints at this time. No visible signs of acute distress noted, safety maintained.

## 2022-09-03 NOTE — ED PROVIDER NOTE - NS ED ROS FT
Constitutional: no fever, no chills  Eyes: no vision changes  ENT: no nasal congestion, no sore throat  CV: no chest pain  Resp: +cough, +shortness of breath  GI: no abdominal pain, no vomiting, no diarrhea  : no dysuria  MSK: +knee pain, +leg swelling  Skin: no rash  Neuro: no headache, no focal weakness, no paresthesias

## 2022-09-03 NOTE — ED PROVIDER NOTE - CLINICAL SUMMARY MEDICAL DECISION MAKING FREE TEXT BOX
57y M w/ hx DM, recent admission for MSSA bacteremia, now presenting with shortness of breath and leg swelling. Pt in no acute respiratory distress at rest; however noted to desat to 88% on RA with ambulation. Concerning for heart failure. Will check EKG, CXR, labs, US lower extremities.

## 2022-09-03 NOTE — ED PROVIDER NOTE - OBJECTIVE STATEMENT
57y M w/ hx IDDM; presents for leg swelling. Pt was recently admitted to hospital for MSSA bacteremia (discharged on Ancef via midline); also treated for L knee effusion/cellulitis. Now reports worsening b/l leg swelling since being discharged 4 days ago. Also complains or worsening exertional dyspnea, and says he gets short of breath with cough when he lies flat. Denies fever, chills, chest pain. Denies prior hx of cardiac disease. Had echo done during recent admission that was reportedly normal.

## 2022-09-03 NOTE — ED ADULT NURSE NOTE - NSIMPLEMENTINTERV_GEN_ALL_ED
Implemented All Universal Safety Interventions:  [place] to call system. Call bell, personal items and telephone within reach. Instruct patient to call for assistance. Room bathroom lighting operational. Non-slip footwear when patient is off stretcher. Physically safe environment: no spills, clutter or unnecessary equipment. Stretcher in lowest position, wheels locked, appropriate side rails in place.

## 2022-09-04 DIAGNOSIS — E11.9 TYPE 2 DIABETES MELLITUS WITHOUT COMPLICATIONS: ICD-10-CM

## 2022-09-04 DIAGNOSIS — I50.9 HEART FAILURE, UNSPECIFIED: ICD-10-CM

## 2022-09-04 DIAGNOSIS — N18.9 CHRONIC KIDNEY DISEASE, UNSPECIFIED: ICD-10-CM

## 2022-09-04 DIAGNOSIS — E83.42 HYPOMAGNESEMIA: ICD-10-CM

## 2022-09-04 DIAGNOSIS — D64.9 ANEMIA, UNSPECIFIED: ICD-10-CM

## 2022-09-04 DIAGNOSIS — R78.81 BACTEREMIA: ICD-10-CM

## 2022-09-04 DIAGNOSIS — R77.8 OTHER SPECIFIED ABNORMALITIES OF PLASMA PROTEINS: ICD-10-CM

## 2022-09-04 PROBLEM — E10.9 TYPE 1 DIABETES MELLITUS WITHOUT COMPLICATIONS: Chronic | Status: ACTIVE | Noted: 2022-08-23

## 2022-09-04 LAB
ALBUMIN SERPL ELPH-MCNC: 2.8 G/DL — LOW (ref 3.3–5.2)
ALBUMIN SERPL ELPH-MCNC: 2.9 G/DL — LOW (ref 3.3–5.2)
ALP SERPL-CCNC: 296 U/L — HIGH (ref 40–120)
ALP SERPL-CCNC: 300 U/L — HIGH (ref 40–120)
ALT FLD-CCNC: 7 U/L — SIGNIFICANT CHANGE UP
ALT FLD-CCNC: 8 U/L — SIGNIFICANT CHANGE UP
ANION GAP SERPL CALC-SCNC: 11 MMOL/L — SIGNIFICANT CHANGE UP (ref 5–17)
ANION GAP SERPL CALC-SCNC: 11 MMOL/L — SIGNIFICANT CHANGE UP (ref 5–17)
AST SERPL-CCNC: 24 U/L — SIGNIFICANT CHANGE UP
AST SERPL-CCNC: 25 U/L — SIGNIFICANT CHANGE UP
BASOPHILS # BLD AUTO: 0.04 K/UL — SIGNIFICANT CHANGE UP (ref 0–0.2)
BASOPHILS NFR BLD AUTO: 0.3 % — SIGNIFICANT CHANGE UP (ref 0–2)
BILIRUB SERPL-MCNC: 0.3 MG/DL — LOW (ref 0.4–2)
BILIRUB SERPL-MCNC: 0.4 MG/DL — SIGNIFICANT CHANGE UP (ref 0.4–2)
BUN SERPL-MCNC: 29.6 MG/DL — HIGH (ref 8–20)
BUN SERPL-MCNC: 30.3 MG/DL — HIGH (ref 8–20)
CALCIUM SERPL-MCNC: 9.5 MG/DL — SIGNIFICANT CHANGE UP (ref 8.4–10.5)
CALCIUM SERPL-MCNC: 9.5 MG/DL — SIGNIFICANT CHANGE UP (ref 8.4–10.5)
CHLORIDE SERPL-SCNC: 95 MMOL/L — LOW (ref 98–107)
CHLORIDE SERPL-SCNC: 98 MMOL/L — SIGNIFICANT CHANGE UP (ref 98–107)
CHOLEST SERPL-MCNC: 165 MG/DL — SIGNIFICANT CHANGE UP
CHOLEST SERPL-MCNC: 178 MG/DL — SIGNIFICANT CHANGE UP
CO2 SERPL-SCNC: 26 MMOL/L — SIGNIFICANT CHANGE UP (ref 22–29)
CO2 SERPL-SCNC: 26 MMOL/L — SIGNIFICANT CHANGE UP (ref 22–29)
CREAT SERPL-MCNC: 1.82 MG/DL — HIGH (ref 0.5–1.3)
CREAT SERPL-MCNC: 1.85 MG/DL — HIGH (ref 0.5–1.3)
EGFR: 42 ML/MIN/1.73M2 — LOW
EGFR: 43 ML/MIN/1.73M2 — LOW
EOSINOPHIL # BLD AUTO: 0.23 K/UL — SIGNIFICANT CHANGE UP (ref 0–0.5)
EOSINOPHIL NFR BLD AUTO: 1.9 % — SIGNIFICANT CHANGE UP (ref 0–6)
FERRITIN SERPL-MCNC: 253 NG/ML — SIGNIFICANT CHANGE UP (ref 30–400)
FERRITIN SERPL-MCNC: 263 NG/ML — SIGNIFICANT CHANGE UP (ref 30–400)
GLUCOSE BLDC GLUCOMTR-MCNC: 105 MG/DL — HIGH (ref 70–99)
GLUCOSE BLDC GLUCOMTR-MCNC: 110 MG/DL — HIGH (ref 70–99)
GLUCOSE BLDC GLUCOMTR-MCNC: 150 MG/DL — HIGH (ref 70–99)
GLUCOSE BLDC GLUCOMTR-MCNC: 216 MG/DL — HIGH (ref 70–99)
GLUCOSE SERPL-MCNC: 100 MG/DL — HIGH (ref 70–99)
GLUCOSE SERPL-MCNC: 129 MG/DL — HIGH (ref 70–99)
HCT VFR BLD CALC: 22.6 % — LOW (ref 39–50)
HDLC SERPL-MCNC: 27 MG/DL — LOW
HDLC SERPL-MCNC: 28 MG/DL — LOW
HGB BLD-MCNC: 7.4 G/DL — LOW (ref 13–17)
IMM GRANULOCYTES NFR BLD AUTO: 0.5 % — SIGNIFICANT CHANGE UP (ref 0–1.5)
LIPID PNL WITH DIRECT LDL SERPL: 114 MG/DL — HIGH
LIPID PNL WITH DIRECT LDL SERPL: 123 MG/DL — HIGH
LYMPHOCYTES # BLD AUTO: 0.79 K/UL — LOW (ref 1–3.3)
LYMPHOCYTES # BLD AUTO: 6.6 % — LOW (ref 13–44)
MAGNESIUM SERPL-MCNC: 1.7 MG/DL — SIGNIFICANT CHANGE UP (ref 1.6–2.6)
MAGNESIUM SERPL-MCNC: 1.8 MG/DL — SIGNIFICANT CHANGE UP (ref 1.6–2.6)
MCHC RBC-ENTMCNC: 29.2 PG — SIGNIFICANT CHANGE UP (ref 27–34)
MCHC RBC-ENTMCNC: 32.7 GM/DL — SIGNIFICANT CHANGE UP (ref 32–36)
MCV RBC AUTO: 89.3 FL — SIGNIFICANT CHANGE UP (ref 80–100)
MONOCYTES # BLD AUTO: 0.67 K/UL — SIGNIFICANT CHANGE UP (ref 0–0.9)
MONOCYTES NFR BLD AUTO: 5.6 % — SIGNIFICANT CHANGE UP (ref 2–14)
NEUTROPHILS # BLD AUTO: 10.22 K/UL — HIGH (ref 1.8–7.4)
NEUTROPHILS NFR BLD AUTO: 85.1 % — HIGH (ref 43–77)
NON HDL CHOLESTEROL: 138 MG/DL — HIGH
NON HDL CHOLESTEROL: 150 MG/DL — HIGH
PHOSPHATE SERPL-MCNC: 3.5 MG/DL — SIGNIFICANT CHANGE UP (ref 2.4–4.7)
PLATELET # BLD AUTO: 380 K/UL — SIGNIFICANT CHANGE UP (ref 150–400)
POTASSIUM SERPL-MCNC: 4.6 MMOL/L — SIGNIFICANT CHANGE UP (ref 3.5–5.3)
POTASSIUM SERPL-MCNC: 4.7 MMOL/L — SIGNIFICANT CHANGE UP (ref 3.5–5.3)
POTASSIUM SERPL-SCNC: 4.6 MMOL/L — SIGNIFICANT CHANGE UP (ref 3.5–5.3)
POTASSIUM SERPL-SCNC: 4.7 MMOL/L — SIGNIFICANT CHANGE UP (ref 3.5–5.3)
PROT SERPL-MCNC: 6 G/DL — LOW (ref 6.6–8.7)
PROT SERPL-MCNC: 6.3 G/DL — LOW (ref 6.6–8.7)
RAPID RVP RESULT: SIGNIFICANT CHANGE UP
RBC # BLD: 2.53 M/UL — LOW (ref 4.2–5.8)
RBC # BLD: 2.53 M/UL — LOW (ref 4.2–5.8)
RBC # FLD: 12.6 % — SIGNIFICANT CHANGE UP (ref 10.3–14.5)
RETICS #: 93.4 K/UL — SIGNIFICANT CHANGE UP (ref 25–125)
RETICS/RBC NFR: 3.7 % — HIGH (ref 0.5–2.5)
SARS-COV-2 RNA SPEC QL NAA+PROBE: SIGNIFICANT CHANGE UP
SODIUM SERPL-SCNC: 132 MMOL/L — LOW (ref 135–145)
SODIUM SERPL-SCNC: 135 MMOL/L — SIGNIFICANT CHANGE UP (ref 135–145)
TRIGL SERPL-MCNC: 122 MG/DL — SIGNIFICANT CHANGE UP
TRIGL SERPL-MCNC: 135 MG/DL — SIGNIFICANT CHANGE UP
TROPONIN T SERPL-MCNC: 0.12 NG/ML — HIGH (ref 0–0.06)
TROPONIN T SERPL-MCNC: 0.12 NG/ML — HIGH (ref 0–0.06)
WBC # BLD: 12.01 K/UL — HIGH (ref 3.8–10.5)
WBC # FLD AUTO: 12.01 K/UL — HIGH (ref 3.8–10.5)

## 2022-09-04 PROCEDURE — 99222 1ST HOSP IP/OBS MODERATE 55: CPT

## 2022-09-04 PROCEDURE — 99223 1ST HOSP IP/OBS HIGH 75: CPT

## 2022-09-04 PROCEDURE — 93010 ELECTROCARDIOGRAM REPORT: CPT

## 2022-09-04 PROCEDURE — 76775 US EXAM ABDO BACK WALL LIM: CPT | Mod: 26

## 2022-09-04 RX ORDER — DEXTROSE 50 % IN WATER 50 %
25 SYRINGE (ML) INTRAVENOUS ONCE
Refills: 0 | Status: DISCONTINUED | OUTPATIENT
Start: 2022-09-04 | End: 2022-09-08

## 2022-09-04 RX ORDER — INSULIN LISPRO 100/ML
VIAL (ML) SUBCUTANEOUS
Refills: 0 | Status: DISCONTINUED | OUTPATIENT
Start: 2022-09-04 | End: 2022-09-08

## 2022-09-04 RX ORDER — SODIUM CHLORIDE 9 MG/ML
1000 INJECTION, SOLUTION INTRAVENOUS
Refills: 0 | Status: DISCONTINUED | OUTPATIENT
Start: 2022-09-04 | End: 2022-09-08

## 2022-09-04 RX ORDER — FUROSEMIDE 40 MG
40 TABLET ORAL DAILY
Refills: 0 | Status: DISCONTINUED | OUTPATIENT
Start: 2022-09-04 | End: 2022-09-05

## 2022-09-04 RX ORDER — POLYETHYLENE GLYCOL 3350 17 G/17G
17 POWDER, FOR SOLUTION ORAL DAILY
Refills: 0 | Status: DISCONTINUED | OUTPATIENT
Start: 2022-09-04 | End: 2022-09-08

## 2022-09-04 RX ORDER — MAGNESIUM SULFATE 500 MG/ML
1 VIAL (ML) INJECTION ONCE
Refills: 0 | Status: COMPLETED | OUTPATIENT
Start: 2022-09-04 | End: 2022-09-04

## 2022-09-04 RX ORDER — CEFAZOLIN SODIUM 1 G
2000 VIAL (EA) INJECTION EVERY 12 HOURS
Refills: 0 | Status: COMPLETED | OUTPATIENT
Start: 2022-09-04 | End: 2022-09-07

## 2022-09-04 RX ORDER — INSULIN LISPRO 100/ML
2 VIAL (ML) SUBCUTANEOUS
Refills: 0 | Status: DISCONTINUED | OUTPATIENT
Start: 2022-09-04 | End: 2022-09-08

## 2022-09-04 RX ORDER — DEXTROSE 50 % IN WATER 50 %
15 SYRINGE (ML) INTRAVENOUS ONCE
Refills: 0 | Status: DISCONTINUED | OUTPATIENT
Start: 2022-09-04 | End: 2022-09-08

## 2022-09-04 RX ORDER — OXYCODONE AND ACETAMINOPHEN 5; 325 MG/1; MG/1
1 TABLET ORAL EVERY 6 HOURS
Refills: 0 | Status: DISCONTINUED | OUTPATIENT
Start: 2022-09-04 | End: 2022-09-06

## 2022-09-04 RX ORDER — INSULIN LISPRO 100/ML
VIAL (ML) SUBCUTANEOUS AT BEDTIME
Refills: 0 | Status: DISCONTINUED | OUTPATIENT
Start: 2022-09-04 | End: 2022-09-08

## 2022-09-04 RX ORDER — HEPARIN SODIUM 5000 [USP'U]/ML
5000 INJECTION INTRAVENOUS; SUBCUTANEOUS EVERY 8 HOURS
Refills: 0 | Status: DISCONTINUED | OUTPATIENT
Start: 2022-09-04 | End: 2022-09-08

## 2022-09-04 RX ORDER — DEXTROSE 50 % IN WATER 50 %
12.5 SYRINGE (ML) INTRAVENOUS ONCE
Refills: 0 | Status: DISCONTINUED | OUTPATIENT
Start: 2022-09-04 | End: 2022-09-08

## 2022-09-04 RX ORDER — GLUCAGON INJECTION, SOLUTION 0.5 MG/.1ML
1 INJECTION, SOLUTION SUBCUTANEOUS ONCE
Refills: 0 | Status: DISCONTINUED | OUTPATIENT
Start: 2022-09-04 | End: 2022-09-08

## 2022-09-04 RX ORDER — INFLUENZA VIRUS VACCINE 15; 15; 15; 15 UG/.5ML; UG/.5ML; UG/.5ML; UG/.5ML
0.5 SUSPENSION INTRAMUSCULAR ONCE
Refills: 0 | Status: DISCONTINUED | OUTPATIENT
Start: 2022-09-04 | End: 2022-09-08

## 2022-09-04 RX ORDER — INSULIN GLARGINE 100 [IU]/ML
14 INJECTION, SOLUTION SUBCUTANEOUS AT BEDTIME
Refills: 0 | Status: DISCONTINUED | OUTPATIENT
Start: 2022-09-04 | End: 2022-09-08

## 2022-09-04 RX ORDER — BUMETANIDE 0.25 MG/ML
1 INJECTION INTRAMUSCULAR; INTRAVENOUS
Refills: 0 | Status: DISCONTINUED | OUTPATIENT
Start: 2022-09-04 | End: 2022-09-06

## 2022-09-04 RX ADMIN — OXYCODONE AND ACETAMINOPHEN 1 TABLET(S): 5; 325 TABLET ORAL at 21:31

## 2022-09-04 RX ADMIN — Medication 2 UNIT(S): at 16:48

## 2022-09-04 RX ADMIN — Medication 2 UNIT(S): at 12:25

## 2022-09-04 RX ADMIN — Medication 100 GRAM(S): at 04:45

## 2022-09-04 RX ADMIN — Medication 100 MILLIGRAM(S): at 00:16

## 2022-09-04 RX ADMIN — Medication 100 MILLIGRAM(S): at 17:56

## 2022-09-04 RX ADMIN — OXYCODONE AND ACETAMINOPHEN 1 TABLET(S): 5; 325 TABLET ORAL at 20:31

## 2022-09-04 RX ADMIN — HEPARIN SODIUM 5000 UNIT(S): 5000 INJECTION INTRAVENOUS; SUBCUTANEOUS at 21:41

## 2022-09-04 RX ADMIN — Medication 40 MILLIGRAM(S): at 06:32

## 2022-09-04 RX ADMIN — INSULIN GLARGINE 14 UNIT(S): 100 INJECTION, SOLUTION SUBCUTANEOUS at 21:40

## 2022-09-04 RX ADMIN — OXYCODONE AND ACETAMINOPHEN 1 TABLET(S): 5; 325 TABLET ORAL at 04:52

## 2022-09-04 RX ADMIN — Medication 100 MILLIGRAM(S): at 06:32

## 2022-09-04 RX ADMIN — HEPARIN SODIUM 5000 UNIT(S): 5000 INJECTION INTRAVENOUS; SUBCUTANEOUS at 13:32

## 2022-09-04 RX ADMIN — HEPARIN SODIUM 5000 UNIT(S): 5000 INJECTION INTRAVENOUS; SUBCUTANEOUS at 06:33

## 2022-09-04 RX ADMIN — Medication 1 TABLET(S): at 11:58

## 2022-09-04 RX ADMIN — BUMETANIDE 1 MILLIGRAM(S): 0.25 INJECTION INTRAMUSCULAR; INTRAVENOUS at 17:55

## 2022-09-04 RX ADMIN — Medication 40 MILLIGRAM(S): at 00:16

## 2022-09-04 RX ADMIN — MAGNESIUM OXIDE 400 MG ORAL TABLET 400 MILLIGRAM(S): 241.3 TABLET ORAL at 00:17

## 2022-09-04 NOTE — CONSULT NOTE ADULT - PROBLEM SELECTOR RECOMMENDATION 2
Repeat blood cultures  Blood cultures from 9/25 were negative, patient has been on Rocephin x 2 weeks.  ID consult

## 2022-09-04 NOTE — CONSULT NOTE ADULT - ASSESSMENT
The patient is a 57 year old male with PMH of DM Type 1, s/p appendectomy, chronic back pain on NSAIDs x 1 year, ? CKD with recent admission at [facility] in August 2022 for fever + chills + L knee pain s/p arthrocentesis which was negative for septic joint, however blood cultures were positive for MSSA, TTE negative for vegetation, d/c'ed on IV cefazolin. He presents with new onset RLE edema, admitted for acute decompensated heart failure. Nephrology is consulted for elevated creatinine. Patient denies history of CKD and hematuria. Admits to intermittent episodes of frothy urine. Denied difficulty with emptying bladder. Does admit to frequent NSAID use over the past 1 year due to chronic back pain. Family hx of ESRD in father (likely due to diabetic nephropathy).     -Patient denied history of CKD   -In August 2022, creatinine ranged 2.2-2.3mg/dL  -While here, creatinine remains elevated but improving - latest creatinine is 1.85mg/dL  -? ESTUARDO vs ? CKD   -No urine studies available - will order UA, UPCR, UACR  -Will check renal ultrasound as well   -BP acceptable  -Currently on bumex 1mg PO BID and lasix 40mg IV daily - favor one loop diuretic at a time  -I/O's not recorded - will order   -On low sodium diet and 1.2L fluid restriction     Fred Sierra, DO  Nephrology

## 2022-09-04 NOTE — H&P ADULT - PROBLEM SELECTOR PLAN 5
avoid nephrotoxic meds, close monitoring of renal function while on iv lasix, will request [facility] consult.

## 2022-09-04 NOTE — H&P ADULT - PROBLEM SELECTOR PLAN 1
admit to tele  pt. clinically volume over loaded more in lower extremities. possible diastolic chf.   last echo from 08/26/22 EF 55-60 %, normal lv systolic function.  will keep on iv lasix 40 mg daily for now with close monitoring of pt's renal function and adjust iv lasix as per clinical response and renal function. admit to tele  pt. clinically volume over loaded more in lower extremities. possible diastolic chf. venous doppler b/L lower ext, no dvt.  last echo from 08/26/22 EF 55-60 %, normal lv systolic function.  will keep on iv lasix 40 mg daily for now with close monitoring of pt's renal function and adjust iv lasix as per clinical response and renal function.

## 2022-09-04 NOTE — H&P ADULT - HISTORY OF PRESENT ILLNESS
58 y/o male came to the ER for worsening lower ext. edema for past 1 + week, also reports that gets sob when lying at night, feels water in chest and that is going on for 6 months. pt. was recently discharged from [facility] with MIRACLE mcclelland for MSSA bacteremia. pt. is on cefazolin 2 gram q 12 hrs till 09/07/22. pt. reports no cp, no fever, no abd. pain, no n/v/d.  56 y/o male came to the ER for worsening lower ext. edema for past 1 + week, also reports that gets sob when lying at night, feels water in chest and that is going on for 6 months. pt. was recently discharged from [facility] with MIRACLE mcclelland for MSSA bacteremia. pt. is on cefazolin 2 gram q 12 hrs till 09/07/22. pt. reports no cp, no fever, no abd. pain, no n/v/d. pt. is on long acting insulin Tresiba, pt. stated that in the hospital his units were lowered 50 % as his BG was running low.  58 y/o male came to the ER for worsening lower ext. edema for past 1 + week, also reports that gets sob when lying at night, feels water in chest and that is going on for 6 months. no sob at rest. pt. was recently discharged from [facility] with MIRACLE mcclelland for MSSA bacteremia. pt. is on cefazolin 2 gram q 12 hrs till 09/07/22. pt. reports no cp, no fever, no abd. pain, no n/v/d. pt. is on long acting insulin Tresiba, pt. stated that in the hospital his units were lowered 50 % as his BG was running low.  58 y/o male came to the ER for worsening lower ext. edema for past 1 + week, also reports that gets sob when lying at night, feels water in chest and that is going on for 6 months. no sob at rest. pt. was recently discharged from [facility] with MIRACLE mcclelland for MSSA bacteremia. pt. is on cefazolin 2 gram q 12 hrs till 09/07/22. pt. reports no cp, no fever, no abd. pain, no n/v/d. no hematemesis, no hemoptysis, no rectal bleed. pt. is on long acting insulin Tresiba, pt. stated that in the hospital his units were lowered 50 % as his BG was running low.

## 2022-09-04 NOTE — H&P ADULT - NSHPPHYSICALEXAM_GEN_ALL_CORE
Vital Signs Last 24 Hrs  T(C): 37.1 (04 Sep 2022 03:38), Max: 37.1 (04 Sep 2022 03:38)  T(F): 98.8 (04 Sep 2022 03:38), Max: 98.8 (04 Sep 2022 03:38)  HR: 84 (04 Sep 2022 03:38) (84 - 88)  BP: 170/80 (04 Sep 2022 03:38) (128/76 - 170/80)  BP(mean): --  RR: 18 (04 Sep 2022 03:38) (16 - 18)  SpO2: 94% (04 Sep 2022 03:38) (94% - 99%)    Parameters below as of 04 Sep 2022 03:38  Patient On (Oxygen Delivery Method): room air    General: pt. in bed not in distress.  eyes : PERRL. intact EOM.   HENT: AT, NC. no throat erythema or exudate. no ear discharge.  Neck: supple. no JVD.   Chest: air entry fair bilaterally, no sig. crackles.  Heart: S1,S2. RRR. no heart murmur. 2+ edema of B/L LE.   Abdomen: soft. non-tender. non-distended. + BS.   Ext: no calf tenderness. lt. knee generalized swelling, old from recent admission.  Neuro: AAO x3. no focal weakness. no speech disorder, cns ii to xii intact.  Skin: warm and dry.   psych : mood ok, no si/hi. Vital Signs Last 24 Hrs  T(C): 37.1 (04 Sep 2022 03:38), Max: 37.1 (04 Sep 2022 03:38)  T(F): 98.8 (04 Sep 2022 03:38), Max: 98.8 (04 Sep 2022 03:38)  HR: 84 (04 Sep 2022 03:38) (84 - 88)  BP: 170/80 (04 Sep 2022 03:38) (128/76 - 170/80)  BP(mean): --  RR: 18 (04 Sep 2022 03:38) (16 - 18)  SpO2: 94% (04 Sep 2022 03:38) (94% - 99%)    Parameters below as of 04 Sep 2022 03:38  Patient On (Oxygen Delivery Method): room air    General: pt. in bed not in distress.  eyes : PERRL. intact EOM.   HENT: AT, NC. no throat erythema or exudate. no ear discharge.  Neck: supple. no JVD.   Chest: air entry fair bilaterally, no sig. crackles.  Heart: S1,S2. RRR. no heart murmur. 2+ edema of B/L LE.   Abdomen: soft. non-tender. non-distended. + BS.   Ext: no calf tenderness. lt. knee generalized swelling, old from recent admission. LUE midline iv access.   Neuro: AAO x3. no focal weakness. no speech disorder, cns ii to xii intact.  Skin: warm and dry.   psych : mood ok, no si/hi. Vital Signs Last 24 Hrs  T(C): 37.1 (04 Sep 2022 03:38), Max: 37.1 (04 Sep 2022 03:38)  T(F): 98.8 (04 Sep 2022 03:38), Max: 98.8 (04 Sep 2022 03:38)  HR: 84 (04 Sep 2022 03:38) (84 - 88)  BP: 170/80 (04 Sep 2022 03:38) (128/76 - 170/80)  BP(mean): --  RR: 18 (04 Sep 2022 03:38) (16 - 18)  SpO2: 94% (04 Sep 2022 03:38) (94% - 99%)    Parameters below as of 04 Sep 2022 03:38  Patient On (Oxygen Delivery Method): room air    General: pt. in bed not in distress.  eyes : PERRL. intact EOM.   HENT: AT, NC. no throat erythema or exudate. no ear discharge.  Neck: supple. no JVD.   Chest: air entry fair bilaterally, no sig. crackles.  Heart: S1,S2. RRR. no heart murmur. 2+ edema of B/L LE.   Abdomen: soft. non-tender. non-distended. + BS.   rectal : deferred by pt. at this point.   Ext: no calf tenderness. lt. knee generalized swelling, old from recent admission. LUE midline iv access.   Neuro: AAO x3. no focal weakness. no speech disorder, cns ii to xii intact.  Skin: warm and dry.   psych : mood ok, no si/hi.

## 2022-09-04 NOTE — CONSULT NOTE ADULT - ASSESSMENT
57 y/omale, PHM:  DM on Insulin, nephropathy, bilaterally foot ulcers form walking on hot pavement, multiple ulcers on finger tips in varying stages of healing, and a facial abscess drained at [facility], presented to [facility] with Increasing lower extremity edema and increasing sob.  Last Hospital admission was on 8/22/22 at [facility] for septic knee, sent home with midline for MSSA bacteremia on Rocephin    Rocephin 2 gram q 12 hrs to continue untill 09/07/22.   Patient states after discharge he noted the knee swelling was traveling down into his ankle, the overh the course of a few days the right ankle began to swell and feel warm as well.   Patient also states that his SOB has increased.  He was SOB x 6 months prior while lifting at work, but after hospital admission he is now unable to walk up a flight of stairs with feeling SOB that hd increased over the 4 days at home.  Also states he as difficulty lying down flat and feeling like he is choking.   Denies cp, palpitations,   fever, chills, jaw pain, back pain, abd. pain, n/v/d, hematemesis, hemoptysis, and rectal bleeding   A cardiac consult was called due to symptoms concerning for CHF and elevated troponin levels.  Patient has never seen a cardiologist, he is non smoker, has bad poor dentition, and  but found to have ESTUARDO and anemia since last admission which are new diagnosis's.  Patient also has a + premature CAD hx in father, elevated trops x 2, and bnp [id number]        57 y/omale, PHM:  DM on Insulin, nephropathy, bilaterally foot ulcers form walking on hot pavement, multiple ulcers on finger tips in varying stages of healing, and a facial abscess drained at [facility], presented to [facility] with Increasing lower extremity edema and increasing sob.  Last Hospital admission was on 8/22/22 at [facility] for septic knee, sent home with midline for MSSA bacteremia on Rocephin    Rocephin 2 gram q 12 hrs to continue untill 09/07/22.   Patient states after discharge he noted the knee swelling was traveling down into his ankle and up over knee cap.  Then over the next few days at home his right ankle began to swell and felt warm as well.   Patient also states that his SOB has increased.  He was SOB x 6 months prior while lifting at work, but after hospital admission he is now unable to walk up a flight of stairs with feeling SOB that hd increased over the 4 days at home.  Also states he as difficulty lying down flat and feeling like he is choking.   Denies cp, palpitations,   fever, chills, jaw pain, back pain, abd. pain, n/v/d, hematemesis, hemoptysis, and rectal bleeding   A cardiac consult was called due to symptoms concerning for CHF and elevated troponin levels.  Patient has never seen a cardiologist, he is non smoker, has bad poor dentition, and  but found to have ESTUARDO and anemia since last admission which are new diagnosis's.  Patient also has a + premature CAD hx in father.  Patient also has a + premature CAD hx in father, elevated trops x 2, and bnp [id number]

## 2022-09-04 NOTE — PATIENT PROFILE ADULT - FALL HARM RISK - HARM RISK INTERVENTIONS

## 2022-09-04 NOTE — H&P ADULT - PROBLEM SELECTOR PLAN 4
no cp, no acute ekg changes. possible renally related, will trend. cardiology consult [facility]. no cp, no acute ekg changes. possible renally related, will trend. cardiology consult [facility].  will check lipid panel.

## 2022-09-04 NOTE — PROGRESS NOTE ADULT - ASSESSMENT
58 y/o male came to the ER for worsening lower ext. edema for past 1 + week, also reports that gets sob when lying at night, feels water in chest and that is going on for 6 months. pt. was recently discharged from [facility] with LUE midline for MSSA bacteremia. pt. is on cefazolin 2 gram q 12 hrs till 09/07/22. pt. reports no cp, no fever, no abd. pain, no n/v/d. pt. is on long acting insulin Tresiba, pt. stated that in the hospital his units were lowered 50 % as his BG was running low.     New onset of congestive heart failure  - Telemetry monitoring  - TTE from 08/26/22 EF 55-60 %, with normal LV  - IV Lasix 40mg daily    Bacteremia  - IV Cefazolin 2g q12h through 9/7 for MSSA bacteremia    DM  - A1c 6.4  - FS with ISS  - Admelog 2 units TID with meals  - Lantus 14 units nightly     Elevated troponin  - Troponin 0.12 x 2  - No chest pain  - Cardiology consulted  - , HDl 27    CKD (chronic kidney disease)  - Avoid nephrotoxic medications  - Nephro consulted    Hypomagnesemia  - Mg 1.8     Anemia  - 7.4  - Chronic  - Monitor CBC    DVT ppx  - Heparin SQ

## 2022-09-04 NOTE — CONSULT NOTE ADULT - PROBLEM SELECTOR RECOMMENDATION 9
TTE tomorrow  LUIS on Tuesday  cont to trend trops  BNP 4203  Strict I and O  Add Bumex 1mg po bid  Monitor renal function with ongoing diuresis  Ace wraps to lower extremity

## 2022-09-04 NOTE — PROGRESS NOTE ADULT - SUBJECTIVE AND OBJECTIVE BOX
Chief complaint: Leg swelling    Patient seen and examined at bedside. No acute overnight events reported. No headache, fever, chills, cough, chest pain, nausea or vomiting.     Vital Signs Last 24 Hrs  T(F): 98 (04 Sep 2022 07:56), Max: 98.8 (04 Sep 2022 03:38)  HR: 82 (04 Sep 2022 07:56) (82 - 88)  BP: 164/87 (04 Sep 2022 07:56) (128/76 - 170/80)  RR: 18 (04 Sep 2022 07:56) (16 - 18)  SpO2: 91% (04 Sep 2022 07:56) (91% - 99%)    Physical Exam:  Constitutional: alert and oriented, in no acute distress   Neck: Soft and supple  Respiratory: Clear to auscultation bilaterally  Cardiovascular: Regular rate and rhyhtm  Gastrointestinal: Soft, non-tender to palpation, +bs  Vascular: 2+ peripheral pulses  Neurological: A/O x 3, no focal neurological deficits  Musculoskeletal: 1+ bilateral lower extremity edema    Labs:                        7.4    12.01 )-----------( 380      ( 04 Sep 2022 09:15 )             22.6   09-04    135  |  98  |  29.6<H>  ----------------------------<  100<H>  4.7   |  26.0  |  1.85<H>    Ca    9.5      04 Sep 2022 09:15  Phos  3.5     09-04  Mg     1.8     09-04    TPro  6.0<L>  /  Alb  2.9<L>  /  TBili  0.3<L>  /  DBili  x   /  AST  25  /  ALT  8   /  AlkPhos  296<H>  09-04

## 2022-09-04 NOTE — H&P ADULT - PROBLEM SELECTOR PLAN 7
Hb noted to be running low, chronic anemia ? ckd related ? will send anemia work up and plan accordingly.  will keep on mvi.

## 2022-09-04 NOTE — H&P ADULT - NSICDXFAMILYHX_GEN_ALL_CORE_FT
FAMILY HISTORY:  Father  Still living? Unknown  Family history of diabetes mellitus (DM), Age at diagnosis: Age Unknown  FH: leukemia, Age at diagnosis: Age Unknown

## 2022-09-04 NOTE — CONSULT NOTE ADULT - SUBJECTIVE AND OBJECTIVE BOX
[facility]                                              CARDIOLOGY AT [facility]                                                   [address], [place] [address]                                             Telephone: [phone]. Fax:[phone]                                                       CARDIOLOGY CONSULTATION NOTE                                                                                             History obtained by: Patient and medical record  Community Cardiologist:  None   obtained: Yes [  ] No [x  ]  Reason for Consultation:  CHF  Available out pt records reviewed: Yes [  ] No [  ]    Chief complaint:    Patient is a 57y old  Male who presents with a chief complaint of new onset chf (04 Sep 2022 15:15)      HPI:  57 y/milli, PHM:  DM on Insulin, nephropathy, bilaterally foot ulcers form walking on hot pavement, multiple ulcers on finger tips in varying stages of healing, and a facial abscess drained at [facility], presented to [facility] with Increasing lower extremity edema and increasing sob.  Last Hospital admission was on 8/22/22 at [facility] for septic knee, sent home with midline for MSSA bacteremia on Rocephin    Rocephin 2 gram q 12 hrs to continue untill 09/07/22.   Patient states after discharge he noted the knee swelling was traveling down into his ankle, the overh the course of a few days the right ankle began to swell and feel warm as well.   Patient also states that his SOB has increased.  He was SOB x 6 months prior while lifting at work, but after hospital admission he is now unable to walk up a flight of stairs with feeling SOB that hd increased over the 4 days at home.  Also states he as difficulty lying down flat and feeling like he is choking.   Denies cp, palpitations,   fever, chills, jaw pain, back pain, abd. pain, n/v/d, hematemesis, hemoptysis, and rectal bleeding   A cardiac consult was called due to symptoms concerning for CHF and elevated troponin levels.  Patient has never seen a cardiologist, he is non smoker, has bad poor dentition, and  but found to have ESTUARDO and anemia since last admission which are new diagnosis's.  Patient also has a + premature CAD hx in father.  Patient also has a + premature CAD hx in father, elevated trops x 2, and bnp 4203           CARDIAC TESTING - None  ECHO:    STRESS:    CATH:     ELECTROPHYSIOLOGY:     PAST MEDICAL HISTORY  Type 1 diabetes        PAST SURGICAL HISTORY  S/P appendectomy    SOCIAL HISTORY:  Denies smoking/alcohol/drugs    FAMILY HISTORY:  Family history of diabetes mellitus (DM) (Father)    FH: leukemia (Father)  Father with Cad in his fiifteis, had CABG and stenting as well    Family History of Cardiovascular Disease:  Yes [ x ] No [  ]  Coronary Artery Disease in first degree relative: Yes [ x ] No [  ]  Sudden Cardiac Death in First degree relative: Yes [  ] No [x  ]    HOME MEDICATIONS:  HumaLOG 100 units/mL injectable solution: 4 unit(s) injectable 3 times a day (before meals) (22 Aug 2022 10:03)  polyethylene glycol 3350 oral powder for reconstitution: 17 gram(s) orally once a day (30 Aug 2022 11:51)  Tresiba 100 units/mL subcutaneous solution: 28 unit(s) subcutaneous once a day (22 Aug 2022 10:03)      CURRENT CARDIAC MEDICATIONS:  furosemide   Injectable 40 milliGRAM(s) IV Push daily    CURRENT OTHER MEDICATIONS:  oxycodone  5 mG/acetaminophen 325 mG 1 Tablet(s) Oral every 6 hours, Stop order after: 4 Doses PRN Moderate Pain (4 - 6)  polyethylene glycol 3350 17 Gram(s) Oral daily PRN Constipation  ceFAZolin   IVPB 2000 milliGRAM(s) IV Intermittent every 12 hours, Stop order after: 4 Days  dextrose 5%. 1000 milliLiter(s) (50 mL/Hr) IV Continuous <Continuous>  dextrose 5%. 1000 milliLiter(s) (100 mL/Hr) IV Continuous <Continuous>  dextrose 50% Injectable 25 Gram(s) IV Push once, Stop order after: 1 Doses  dextrose 50% Injectable 12.5 Gram(s) IV Push once, Stop order after: 1 Doses  dextrose 50% Injectable 25 Gram(s) IV Push once, Stop order after: 1 Doses  dextrose Oral Gel 15 Gram(s) Oral once, Stop order after: 1 Doses PRN Blood Glucose LESS THAN 70 milliGRAM(s)/deciliter  glucagon  Injectable 1 milliGRAM(s) IntraMuscular once, Stop order after: 1 Doses  heparin   Injectable 5000 Unit(s) SubCutaneous every 8 hours  influenza   Vaccine 0.5 milliLiter(s) IntraMuscular once  insulin glargine Injectable (LANTUS) 14 Unit(s) SubCutaneous at bedtime  insulin lispro (ADMELOG) corrective regimen sliding scale   SubCutaneous three times a day before meals  insulin lispro (ADMELOG) corrective regimen sliding scale   SubCutaneous at bedtime  insulin lispro Injectable (ADMELOG) 2 Unit(s) SubCutaneous three times a day before meals  multivitamin 1 Tablet(s) Oral daily    ALLERGIES:   No Known Allergies    REVIEW OF SYMPTOMS:   CONSTITUTIONAL: No fever, no chills, no weight loss, no weight gain, no fatigue   ENMT:  No vertigo; No sinus or throat pain  NECK: No pain or stiffness  CARDIOVASCULAR: No chest pain, no dyspnea, no syncope/presyncope, no palpitations, no dizziness, no Orthopnea, no Paroxsymal nocturnal dyspnea  RESPIRATORY: no Shortness of breath, no cough, no wheezing  : No dysuria, no hematuria   GI: No dark color stool, no nausea, no diarrhea, no constipation, no abdominal pain   NEURO: No headache, no slurred speech   MUSCULOSKELETAL: No joint pain or swelling; No muscle, back, or extremity pain  PSYCH: No agitation, no anxiety.    ALL OTHER REVIEW OF SYSTEMS ARE NEGATIVE.    VITAL SIGNS:  T(C): 36.7 (09-04-22 @ 12:34), Max: 37.1 (09-04-22 @ 03:38)  T(F): 98.1 (09-04-22 @ 12:34), Max: 98.8 (09-04-22 @ 03:38)  HR: 81 (09-04-22 @ 12:34) (81 - 88)  BP: 164/85 (09-04-22 @ 12:34) (128/76 - 170/80)  RR: 18 (09-04-22 @ 12:34) (16 - 18)  SpO2: 98% (09-04-22 @ 12:34) (91% - 99%)    PHYSICAL EXAM:  Constitutional: Comfortable . No acute distress.   HEENT: Atraumatic and normocephalic , neck is supple . no JVD. No carotid bruit.  CNS: A&Ox3. No focal deficits.   Respiratory: CTAB, unlabored   Cardiovascular: RRR normal s1 s2. No murmur. No rubs or gallop.  Gastrointestinal: Soft, non-tender. +Bowel sounds.   Extremities: + Peripheral Pulses, finger tips with ulcers in varying state of healing,  + 2 edema of left leg up to thigh/hip edema.   Right ankle edema + 1  Psychiatric: Calm.  No agitation.   Skin: hot and dry on affected areas, + for ulcers on fingers.      LABS:  ( 04 Sep 2022 09:15 )  Troponin T  0.12<H>,  CPK  X    , CKMB  X    , BNP X        , ( 04 Sep 2022 01:32 )  Troponin T  0.12<H>,  CPK  X    , CKMB  X    , BNP X        , ( 03 Sep 2022 17:52 )  Troponin T  X    ,  CPK  245<H>, CKMB  X    , BNP 4203<H>                       7.4    12.01 )-----------( 380      ( 04 Sep 2022 09:15 )             22.6     09-04  135  |  98  |  29.6<H>  ----------------------------<  100<H>  4.7   |  26.0  |  1.85<H>  Ca    9.5      04 Sep 2022 09:15  Phos  3.5     09-04  Mg     1.8     09-04  TPro  6.0<L>  /  Alb  2.9<L>  /  TBili  0.3<L>  /  DBili  x   /  AST  25  /  ALT  8   /  AlkPhos  296<H>  09-04  PT/INR - ( 03 Sep 2022 17:52 )   PT: 12.4 sec;   INR: 1.07 ratio    PTT - ( 03 Sep 2022 17:52 )  PTT:30.1 sec  09-04-22 @ 09:15  Cholesterol 165,  HDL: 27,  LDL: 114, Triglycerides: 122     INTERPRETATION OF TELEMETRY:   SR no acute alarms     ECG:  SR no acute alarms noted, q wave in v2 -isolated lead.    Prior ECG: Yes [  ] No [  ]    RADIOLOGY & ADDITIONAL STUDIES:    X-ray:    CT scan:   ACC: [id number] EXAM:  US DPLX LWR EXT VEINS COMPL BI                        PROCEDURE DATE:  09/03/2022    INTERPRETATION:  CLINICAL INFORMATION: Bilateral lower extremity edema  COMPARISON: Lower extremity venous Doppler ultrasound 8/23/2022  TECHNIQUE: Duplex sonography of the BILATERAL LOWER extremity veins with   color and spectral Doppler, with and without compression.  FINDINGS:  RIGHT:  Normal compressibility of the RIGHT common femoral, femoral and popliteal   veins.  Doppler examination shows normal spontaneous and phasic flow.  No RIGHT calf vein thrombosis is detected.  LEFT:  Normal compressibility of the LEFT common femoral, femoral and popliteal   veins.  Doppler examination shows normal spontaneous and phasic flow.  No LEFT calf vein thrombosis is detected.  Redemonstration of left knee joint effusion.  IMPRESSION:  No evidence of deep venous thrombosis in either lower extremity.  Left knee effusion as was seen on prior study.  SARAH VENTURA MD; Attending Radiologist                                                [facility]                                              CARDIOLOGY AT [facility]                                                   [address], [place] [address]                                             Telephone: [phone]. Fax:[phone]                                                       CARDIOLOGY CONSULTATION NOTE                                                                                             History obtained by: Patient and medical record  Community Cardiologist:  None   obtained: Yes [  ] No [x  ]  Reason for Consultation:  CHF  Available out pt records reviewed: Yes [  ] No [  ]    Chief complaint:    Patient is a 57y old  Male who presents with a chief complaint of new onset chf (04 Sep 2022 15:15)      HPI:  57 y/milli, PHM:  DM on Insulin, nephropathy, bilaterally foot ulcers form walking on hot pavement, multiple ulcers on finger tips in varying stages of healing, and a facial abscess drained at [facility], presented to [facility] with Increasing lower extremity edema and increasing sob.  Last Hospital admission was on 8/22/22 at [facility] for septic knee, sent home with midline for MSSA bacteremia on Rocephin    Rocephin 2 gram q 12 hrs to continue untill 09/07/22.   Patient states after discharge he noted the knee swelling was traveling down into his ankle and up over knee cap.  Then over the next few days at home his right ankle began to swell and felt warm as well.   Patient also states that his SOB has increased.  He was SOB x 6 months prior while lifting at work, but after hospital admission he is now unable to walk up a flight of stairs with feeling SOB that hd increased over the 4 days at home.  Also states he as difficulty lying down flat and feeling like he is choking.   Denies cp, palpitations,   fever, chills, jaw pain, back pain, abd. pain, n/v/d, hematemesis, hemoptysis, and rectal bleeding   A cardiac consult was called due to symptoms concerning for CHF and elevated troponin levels.  Patient has never seen a cardiologist, he is non smoker, has bad poor dentition, and  but found to have ESTUARDO and anemia since last admission which are new diagnosis's.  Patient also has a + premature CAD hx in father.  Patient also has a + premature CAD hx in father, elevated trops x 2, and bnp 4203           CARDIAC TESTING - None  ECHO:    STRESS:    CATH:     ELECTROPHYSIOLOGY:     PAST MEDICAL HISTORY  Type 1 diabetes        PAST SURGICAL HISTORY  S/P appendectomy    SOCIAL HISTORY:  Denies smoking/alcohol/drugs    FAMILY HISTORY:  Family history of diabetes mellitus (DM) (Father)    FH: leukemia (Father)  Father with Cad in his fiifteis, had CABG and stenting as well    Family History of Cardiovascular Disease:  Yes [ x ] No [  ]  Coronary Artery Disease in first degree relative: Yes [ x ] No [  ]  Sudden Cardiac Death in First degree relative: Yes [  ] No [x  ]    HOME MEDICATIONS:  HumaLOG 100 units/mL injectable solution: 4 unit(s) injectable 3 times a day (before meals) (22 Aug 2022 10:03)  polyethylene glycol 3350 oral powder for reconstitution: 17 gram(s) orally once a day (30 Aug 2022 11:51)  Tresiba 100 units/mL subcutaneous solution: 28 unit(s) subcutaneous once a day (22 Aug 2022 10:03)      CURRENT CARDIAC MEDICATIONS:  furosemide   Injectable 40 milliGRAM(s) IV Push daily    CURRENT OTHER MEDICATIONS:  oxycodone  5 mG/acetaminophen 325 mG 1 Tablet(s) Oral every 6 hours, Stop order after: 4 Doses PRN Moderate Pain (4 - 6)  polyethylene glycol 3350 17 Gram(s) Oral daily PRN Constipation  ceFAZolin   IVPB 2000 milliGRAM(s) IV Intermittent every 12 hours, Stop order after: 4 Days  dextrose 5%. 1000 milliLiter(s) (50 mL/Hr) IV Continuous <Continuous>  dextrose 5%. 1000 milliLiter(s) (100 mL/Hr) IV Continuous <Continuous>  dextrose 50% Injectable 25 Gram(s) IV Push once, Stop order after: 1 Doses  dextrose 50% Injectable 12.5 Gram(s) IV Push once, Stop order after: 1 Doses  dextrose 50% Injectable 25 Gram(s) IV Push once, Stop order after: 1 Doses  dextrose Oral Gel 15 Gram(s) Oral once, Stop order after: 1 Doses PRN Blood Glucose LESS THAN 70 milliGRAM(s)/deciliter  glucagon  Injectable 1 milliGRAM(s) IntraMuscular once, Stop order after: 1 Doses  heparin   Injectable 5000 Unit(s) SubCutaneous every 8 hours  influenza   Vaccine 0.5 milliLiter(s) IntraMuscular once  insulin glargine Injectable (LANTUS) 14 Unit(s) SubCutaneous at bedtime  insulin lispro (ADMELOG) corrective regimen sliding scale   SubCutaneous three times a day before meals  insulin lispro (ADMELOG) corrective regimen sliding scale   SubCutaneous at bedtime  insulin lispro Injectable (ADMELOG) 2 Unit(s) SubCutaneous three times a day before meals  multivitamin 1 Tablet(s) Oral daily    ALLERGIES:   No Known Allergies    REVIEW OF SYMPTOMS:   CONSTITUTIONAL: No fever, no chills, no weight loss, no weight gain, no fatigue   ENMT:  No vertigo; No sinus or throat pain  NECK: No pain or stiffness  CARDIOVASCULAR: No chest pain, no dyspnea, no syncope/presyncope, no palpitations, no dizziness, no Orthopnea, no Paroxsymal nocturnal dyspnea  RESPIRATORY: no Shortness of breath, no cough, no wheezing  : No dysuria, no hematuria   GI: No dark color stool, no nausea, no diarrhea, no constipation, no abdominal pain   NEURO: No headache, no slurred speech   MUSCULOSKELETAL: No joint pain or swelling; No muscle, back, or extremity pain  PSYCH: No agitation, no anxiety.    ALL OTHER REVIEW OF SYSTEMS ARE NEGATIVE.    VITAL SIGNS:  T(C): 36.7 (09-04-22 @ 12:34), Max: 37.1 (09-04-22 @ 03:38)  T(F): 98.1 (09-04-22 @ 12:34), Max: 98.8 (09-04-22 @ 03:38)  HR: 81 (09-04-22 @ 12:34) (81 - 88)  BP: 164/85 (09-04-22 @ 12:34) (128/76 - 170/80)  RR: 18 (09-04-22 @ 12:34) (16 - 18)  SpO2: 98% (09-04-22 @ 12:34) (91% - 99%)    PHYSICAL EXAM:  Constitutional: Comfortable . No acute distress.   HEENT: Atraumatic and normocephalic , neck is supple . no JVD. No carotid bruit.  CNS: A&Ox3. No focal deficits.   Respiratory: CTAB, unlabored   Cardiovascular: RRR normal s1 s2. No murmur. No rubs or gallop.  Gastrointestinal: Soft, non-tender. +Bowel sounds.   Extremities: + Peripheral Pulses, finger tips with ulcers in varying state of healing,  + 2 edema of left leg up to thigh/hip edema.   Right ankle edema + 1  Psychiatric: Calm.  No agitation.   Skin: hot and dry on affected areas, + for ulcers on fingers.      LABS:  ( 04 Sep 2022 09:15 )  Troponin T  0.12<H>,  CPK  X    , CKMB  X    , BNP X        , ( 04 Sep 2022 01:32 )  Troponin T  0.12<H>,  CPK  X    , CKMB  X    , BNP X        , ( 03 Sep 2022 17:52 )  Troponin T  X    ,  CPK  245<H>, CKMB  X    , BNP 4203<H>                       7.4    12.01 )-----------( 380      ( 04 Sep 2022 09:15 )             22.6     09-04  135  |  98  |  29.6<H>  ----------------------------<  100<H>  4.7   |  26.0  |  1.85<H>  Ca    9.5      04 Sep 2022 09:15  Phos  3.5     09-04  Mg     1.8     09-04  TPro  6.0<L>  /  Alb  2.9<L>  /  TBili  0.3<L>  /  DBili  x   /  AST  25  /  ALT  8   /  AlkPhos  296<H>  09-04  PT/INR - ( 03 Sep 2022 17:52 )   PT: 12.4 sec;   INR: 1.07 ratio    PTT - ( 03 Sep 2022 17:52 )  PTT:30.1 sec  09-04-22 @ 09:15  Cholesterol 165,  HDL: 27,  LDL: 114, Triglycerides: 122     INTERPRETATION OF TELEMETRY:   SR no acute alarms     ECG:  SR no acute alarms noted, q wave in v2 -isolated lead.    Prior ECG: Yes [  ] No [  ]    RADIOLOGY & ADDITIONAL STUDIES:    X-ray:    CT scan:   ACC: [id number] EXAM:  US DPLX LWR EXT VEINS COMPL BI                        PROCEDURE DATE:  09/03/2022    INTERPRETATION:  CLINICAL INFORMATION: Bilateral lower extremity edema  COMPARISON: Lower extremity venous Doppler ultrasound 8/23/2022  TECHNIQUE: Duplex sonography of the BILATERAL LOWER extremity veins with   color and spectral Doppler, with and without compression.  FINDINGS:  RIGHT:  Normal compressibility of the RIGHT common femoral, femoral and popliteal   veins.  Doppler examination shows normal spontaneous and phasic flow.  No RIGHT calf vein thrombosis is detected.  LEFT:  Normal compressibility of the LEFT common femoral, femoral and popliteal   veins.  Doppler examination shows normal spontaneous and phasic flow.  No LEFT calf vein thrombosis is detected.  Redemonstration of left knee joint effusion.  IMPRESSION:  No evidence of deep venous thrombosis in either lower extremity.  Left knee effusion as was seen on prior study.  SARAH VENTURA MD; Attending Radiologist                                                [facility]                                              CARDIOLOGY AT [facility]                                                   [address], [place] [address]                                             Telephone: [phone]. Fax:[phone]                                                       CARDIOLOGY CONSULTATION NOTE                                                                                             History obtained by: Patient and medical record  Community Cardiologist:  None   obtained: Yes [  ] No [x  ]  Reason for Consultation:  CHF  Available out pt records reviewed: Yes [  ] No [  ]    Chief complaint:    Patient is a 57y old  Male who presents with a chief complaint of new onset chf (04 Sep 2022 15:15)      HPI:  57 y/milli, PHM:  DM on Insulin, nephropathy, bilaterally foot ulcers form walking on hot pavement, multiple ulcers on finger tips in varying stages of healing, and a facial abscess drained at [facility], presented to [facility] with Increasing lower extremity edema and increasing sob.  Last Hospital admission was on 8/22/22 at [facility] for septic knee, sent home with midline for MSSA bacteremia on Rocephin    Rocephin 2 gram q 12 hrs to continue untill 09/07/22.   Patient states after discharge he noted the knee swelling was traveling down into his ankle and up over knee cap.  Then over the next few days at home his right ankle began to swell and felt warm as well.   Patient also states that his SOB has increased.  He was SOB x 6 months prior while lifting at work, but after hospital admission he is now unable to walk up a flight of stairs with feeling SOB that hd increased over the 4 days at home.  Also states he as difficulty lying down flat and feeling like he is choking.   Denies cp, palpitations,   fever, chills, jaw pain, back pain, abd. pain, n/v/d, hematemesis, hemoptysis, and rectal bleeding   A cardiac consult was called due to symptoms concerning for CHF and elevated troponin levels.  Patient has never seen a cardiologist, he is non smoker, has bad poor dentition, and  but found to have ESTUARDO and anemia since last admission which are new diagnosis's.  Patient also has a + premature CAD hx in father.  Patient also has a + premature CAD hx in father, elevated trops x 2, and bnp 4203           CARDIAC TESTING - None  ECHO:    STRESS:    CATH:     ELECTROPHYSIOLOGY:     PAST MEDICAL HISTORY  Type 1 diabetes        PAST SURGICAL HISTORY  S/P appendectomy    SOCIAL HISTORY:  Denies smoking/alcohol/drugs    FAMILY HISTORY:  Family history of diabetes mellitus (DM) (Father)    FH: leukemia (Father)  Father with Cad in his fiifteis, had CABG and stenting as well    Family History of Cardiovascular Disease:  Yes [ x ] No [  ]  Coronary Artery Disease in first degree relative: Yes [ x ] No [  ]  Sudden Cardiac Death in First degree relative: Yes [  ] No [x  ]    HOME MEDICATIONS:  HumaLOG 100 units/mL injectable solution: 4 unit(s) injectable 3 times a day (before meals) (22 Aug 2022 10:03)  polyethylene glycol 3350 oral powder for reconstitution: 17 gram(s) orally once a day (30 Aug 2022 11:51)  Tresiba 100 units/mL subcutaneous solution: 28 unit(s) subcutaneous once a day (22 Aug 2022 10:03)      CURRENT CARDIAC MEDICATIONS:  furosemide   Injectable 40 milliGRAM(s) IV Push daily    CURRENT OTHER MEDICATIONS:  oxycodone  5 mG/acetaminophen 325 mG 1 Tablet(s) Oral every 6 hours, Stop order after: 4 Doses PRN Moderate Pain (4 - 6)  polyethylene glycol 3350 17 Gram(s) Oral daily PRN Constipation  ceFAZolin   IVPB 2000 milliGRAM(s) IV Intermittent every 12 hours, Stop order after: 4 Days  dextrose 5%. 1000 milliLiter(s) (50 mL/Hr) IV Continuous <Continuous>  dextrose 5%. 1000 milliLiter(s) (100 mL/Hr) IV Continuous <Continuous>  dextrose 50% Injectable 25 Gram(s) IV Push once, Stop order after: 1 Doses  dextrose 50% Injectable 12.5 Gram(s) IV Push once, Stop order after: 1 Doses  dextrose 50% Injectable 25 Gram(s) IV Push once, Stop order after: 1 Doses  dextrose Oral Gel 15 Gram(s) Oral once, Stop order after: 1 Doses PRN Blood Glucose LESS THAN 70 milliGRAM(s)/deciliter  glucagon  Injectable 1 milliGRAM(s) IntraMuscular once, Stop order after: 1 Doses  heparin   Injectable 5000 Unit(s) SubCutaneous every 8 hours  influenza   Vaccine 0.5 milliLiter(s) IntraMuscular once  insulin glargine Injectable (LANTUS) 14 Unit(s) SubCutaneous at bedtime  insulin lispro (ADMELOG) corrective regimen sliding scale   SubCutaneous three times a day before meals  insulin lispro (ADMELOG) corrective regimen sliding scale   SubCutaneous at bedtime  insulin lispro Injectable (ADMELOG) 2 Unit(s) SubCutaneous three times a day before meals  multivitamin 1 Tablet(s) Oral daily    ALLERGIES:   No Known Allergies    REVIEW OF SYMPTOMS:   CONSTITUTIONAL: No fever, no chills, no weight loss, no weight gain, no fatigue   ENMT:  No vertigo; No sinus or throat pain  NECK: No pain or stiffness  CARDIOVASCULAR: No chest pain, SEE HPI dyspnea, no syncope/presyncope, no palpitations, no dizziness, SEE HIP Orthopnea - Paroxsymal nocturnal dyspnea  RESPIRATORY: no Shortness of breath, no cough, no wheezing  : No dysuria, no hematuria   GI: No dark color stool, no nausea, no diarrhea, no constipation, no abdominal pain   NEURO: No headache, no slurred speech   MUSCULOSKELETAL: SEE HPI No muscle, back.    PSYCH: No agitation, no anxiety.    ALL OTHER REVIEW OF SYSTEMS ARE NEGATIVE.    VITAL SIGNS:  T(C): 36.7 (09-04-22 @ 12:34), Max: 37.1 (09-04-22 @ 03:38)  T(F): 98.1 (09-04-22 @ 12:34), Max: 98.8 (09-04-22 @ 03:38)  HR: 81 (09-04-22 @ 12:34) (81 - 88)  BP: 164/85 (09-04-22 @ 12:34) (128/76 - 170/80)  RR: 18 (09-04-22 @ 12:34) (16 - 18)  SpO2: 98% (09-04-22 @ 12:34) (91% - 99%)    PHYSICAL EXAM:  Constitutional: Comfortable . No acute distress.   HEENT: Atraumatic and normocephalic , neck is supple . no JVD. No carotid bruit.  CNS: A&Ox3. No focal deficits.   Respiratory: CTAB, unlabored   Cardiovascular: RRR normal s1 s2. No murmur. No rubs or gallop.  Gastrointestinal: Soft, non-tender. +Bowel sounds.   Extremities: + Peripheral Pulses, finger tips with ulcers in varying state of healing,  + 2 edema of left leg up to thigh/hip edema.   Right ankle edema + 1  Psychiatric: Calm.  No agitation.   Skin: hot and dry on affected areas, + for ulcers on fingers.      LABS:  ( 04 Sep 2022 09:15 )  Troponin T  0.12<H>,  CPK  X    , CKMB  X    , BNP X        , ( 04 Sep 2022 01:32 )  Troponin T  0.12<H>,  CPK  X    , CKMB  X    , BNP X        , ( 03 Sep 2022 17:52 )  Troponin T  X    ,  CPK  245<H>, CKMB  X    , BNP 4203<H>                       7.4    12.01 )-----------( 380      ( 04 Sep 2022 09:15 )             22.6     09-04  135  |  98  |  29.6<H>  ----------------------------<  100<H>  4.7   |  26.0  |  1.85<H>  Ca    9.5      04 Sep 2022 09:15  Phos  3.5     09-04  Mg     1.8     09-04  TPro  6.0<L>  /  Alb  2.9<L>  /  TBili  0.3<L>  /  DBili  x   /  AST  25  /  ALT  8   /  AlkPhos  296<H>  09-04  PT/INR - ( 03 Sep 2022 17:52 )   PT: 12.4 sec;   INR: 1.07 ratio    PTT - ( 03 Sep 2022 17:52 )  PTT:30.1 sec  09-04-22 @ 09:15  Cholesterol 165,  HDL: 27,  LDL: 114, Triglycerides: 122     INTERPRETATION OF TELEMETRY:   SR no acute alarms     ECG:  SR no acute alarms noted, q wave in v2 -isolated lead.    Prior ECG: Yes [  ] No [  ]    RADIOLOGY & ADDITIONAL STUDIES:    X-ray:    CT scan:   ACC: [id number] EXAM:  US DPLX LWR EXT VEINS COMPL BI                        PROCEDURE DATE:  09/03/2022    INTERPRETATION:  CLINICAL INFORMATION: Bilateral lower extremity edema  COMPARISON: Lower extremity venous Doppler ultrasound 8/23/2022  TECHNIQUE: Duplex sonography of the BILATERAL LOWER extremity veins with   color and spectral Doppler, with and without compression.  FINDINGS:  RIGHT:  Normal compressibility of the RIGHT common femoral, femoral and popliteal   veins.  Doppler examination shows normal spontaneous and phasic flow.  No RIGHT calf vein thrombosis is detected.  LEFT:  Normal compressibility of the LEFT common femoral, femoral and popliteal   veins.  Doppler examination shows normal spontaneous and phasic flow.  No LEFT calf vein thrombosis is detected.  Redemonstration of left knee joint effusion.  IMPRESSION:  No evidence of deep venous thrombosis in either lower extremity.  Left knee effusion as was seen on prior study.  SARAH VENTURA MD; Attending Radiologist

## 2022-09-05 LAB
ANION GAP SERPL CALC-SCNC: 9 MMOL/L — SIGNIFICANT CHANGE UP (ref 5–17)
APPEARANCE UR: CLEAR — SIGNIFICANT CHANGE UP
BACTERIA # UR AUTO: ABNORMAL
BILIRUB UR-MCNC: NEGATIVE — SIGNIFICANT CHANGE UP
BUN SERPL-MCNC: 28.6 MG/DL — HIGH (ref 8–20)
CALCIUM SERPL-MCNC: 9.2 MG/DL — SIGNIFICANT CHANGE UP (ref 8.4–10.5)
CHLORIDE SERPL-SCNC: 98 MMOL/L — SIGNIFICANT CHANGE UP (ref 98–107)
CO2 SERPL-SCNC: 27 MMOL/L — SIGNIFICANT CHANGE UP (ref 22–29)
COLOR SPEC: YELLOW — SIGNIFICANT CHANGE UP
CREAT ?TM UR-MCNC: 52 MG/DL — SIGNIFICANT CHANGE UP
CREAT SERPL-MCNC: 1.87 MG/DL — HIGH (ref 0.5–1.3)
DIFF PNL FLD: ABNORMAL
EGFR: 42 ML/MIN/1.73M2 — LOW
GLUCOSE BLDC GLUCOMTR-MCNC: 112 MG/DL — HIGH (ref 70–99)
GLUCOSE BLDC GLUCOMTR-MCNC: 118 MG/DL — HIGH (ref 70–99)
GLUCOSE BLDC GLUCOMTR-MCNC: 165 MG/DL — HIGH (ref 70–99)
GLUCOSE BLDC GLUCOMTR-MCNC: 86 MG/DL — SIGNIFICANT CHANGE UP (ref 70–99)
GLUCOSE SERPL-MCNC: 120 MG/DL — HIGH (ref 70–99)
GLUCOSE UR QL: NEGATIVE MG/DL — SIGNIFICANT CHANGE UP
HCT VFR BLD CALC: 22.9 % — LOW (ref 39–50)
HGB BLD-MCNC: 7.4 G/DL — LOW (ref 13–17)
IRON SATN MFR SERPL: 22 UG/DL — LOW (ref 59–158)
IRON SATN MFR SERPL: 9 % — LOW (ref 16–55)
KETONES UR-MCNC: NEGATIVE — SIGNIFICANT CHANGE UP
LEUKOCYTE ESTERASE UR-ACNC: NEGATIVE — SIGNIFICANT CHANGE UP
MCHC RBC-ENTMCNC: 29.1 PG — SIGNIFICANT CHANGE UP (ref 27–34)
MCHC RBC-ENTMCNC: 32.3 GM/DL — SIGNIFICANT CHANGE UP (ref 32–36)
MCV RBC AUTO: 90.2 FL — SIGNIFICANT CHANGE UP (ref 80–100)
NITRITE UR-MCNC: NEGATIVE — SIGNIFICANT CHANGE UP
PH UR: 6 — SIGNIFICANT CHANGE UP (ref 5–8)
PLATELET # BLD AUTO: 373 K/UL — SIGNIFICANT CHANGE UP (ref 150–400)
POTASSIUM SERPL-MCNC: 4.6 MMOL/L — SIGNIFICANT CHANGE UP (ref 3.5–5.3)
POTASSIUM SERPL-SCNC: 4.6 MMOL/L — SIGNIFICANT CHANGE UP (ref 3.5–5.3)
PROT ?TM UR-MCNC: 60 MG/DL — HIGH (ref 0–12)
PROT UR-MCNC: 30 MG/DL
PROT/CREAT UR-RTO: 1.2 RATIO — HIGH
RBC # BLD: 2.54 M/UL — LOW (ref 4.2–5.8)
RBC # FLD: 12.5 % — SIGNIFICANT CHANGE UP (ref 10.3–14.5)
RBC CASTS # UR COMP ASSIST: SIGNIFICANT CHANGE UP /HPF (ref 0–4)
SODIUM SERPL-SCNC: 134 MMOL/L — LOW (ref 135–145)
SP GR SPEC: 1.01 — SIGNIFICANT CHANGE UP (ref 1.01–1.02)
TIBC SERPL-MCNC: 256 UG/DL — SIGNIFICANT CHANGE UP (ref 220–430)
TRANSFERRIN SERPL-MCNC: 179 MG/DL — LOW (ref 180–329)
TROPONIN T SERPL-MCNC: 0.14 NG/ML — HIGH (ref 0–0.06)
UROBILINOGEN FLD QL: NEGATIVE MG/DL — SIGNIFICANT CHANGE UP
WBC # BLD: 8.63 K/UL — SIGNIFICANT CHANGE UP (ref 3.8–10.5)
WBC # FLD AUTO: 8.63 K/UL — SIGNIFICANT CHANGE UP (ref 3.8–10.5)
WBC UR QL: SIGNIFICANT CHANGE UP /HPF (ref 0–5)

## 2022-09-05 PROCEDURE — 99232 SBSQ HOSP IP/OBS MODERATE 35: CPT

## 2022-09-05 PROCEDURE — 99233 SBSQ HOSP IP/OBS HIGH 50: CPT

## 2022-09-05 RX ORDER — HYDRALAZINE HCL 50 MG
10 TABLET ORAL ONCE
Refills: 0 | Status: COMPLETED | OUTPATIENT
Start: 2022-09-05 | End: 2022-09-05

## 2022-09-05 RX ADMIN — Medication 2 UNIT(S): at 16:39

## 2022-09-05 RX ADMIN — OXYCODONE AND ACETAMINOPHEN 1 TABLET(S): 5; 325 TABLET ORAL at 08:41

## 2022-09-05 RX ADMIN — HEPARIN SODIUM 5000 UNIT(S): 5000 INJECTION INTRAVENOUS; SUBCUTANEOUS at 13:36

## 2022-09-05 RX ADMIN — Medication 100 MILLIGRAM(S): at 05:25

## 2022-09-05 RX ADMIN — OXYCODONE AND ACETAMINOPHEN 1 TABLET(S): 5; 325 TABLET ORAL at 09:58

## 2022-09-05 RX ADMIN — Medication 40 MILLIGRAM(S): at 05:25

## 2022-09-05 RX ADMIN — HEPARIN SODIUM 5000 UNIT(S): 5000 INJECTION INTRAVENOUS; SUBCUTANEOUS at 05:25

## 2022-09-05 RX ADMIN — Medication 100 MILLIGRAM(S): at 17:41

## 2022-09-05 RX ADMIN — HEPARIN SODIUM 5000 UNIT(S): 5000 INJECTION INTRAVENOUS; SUBCUTANEOUS at 21:14

## 2022-09-05 RX ADMIN — Medication 10 MILLIGRAM(S): at 18:20

## 2022-09-05 RX ADMIN — INSULIN GLARGINE 14 UNIT(S): 100 INJECTION, SOLUTION SUBCUTANEOUS at 21:13

## 2022-09-05 RX ADMIN — BUMETANIDE 1 MILLIGRAM(S): 0.25 INJECTION INTRAMUSCULAR; INTRAVENOUS at 05:24

## 2022-09-05 RX ADMIN — BUMETANIDE 1 MILLIGRAM(S): 0.25 INJECTION INTRAMUSCULAR; INTRAVENOUS at 13:35

## 2022-09-05 RX ADMIN — Medication 2 UNIT(S): at 08:40

## 2022-09-05 RX ADMIN — Medication 1 TABLET(S): at 12:17

## 2022-09-05 NOTE — PROGRESS NOTE ADULT - SUBJECTIVE AND OBJECTIVE BOX
[facility] Division of Hospital Medicine    Chief Complaint:  leg swelling and SOB    SUBJECTIVE / OVERNIGHT EVENTS:  Reports SOB is improved  Leg swelling is persistent  Daughter at bedside     Patient denies chest pain, abd pain, N/V, fever, chills, dysuria or any other complaints. All remainder ROS negative.     MEDICATIONS  (STANDING):  buMETAnide 1 milliGRAM(s) Oral two times a day  ceFAZolin   IVPB 2000 milliGRAM(s) IV Intermittent every 12 hours  dextrose 5%. 1000 milliLiter(s) (50 mL/Hr) IV Continuous <Continuous>  dextrose 5%. 1000 milliLiter(s) (100 mL/Hr) IV Continuous <Continuous>  dextrose 50% Injectable 25 Gram(s) IV Push once  dextrose 50% Injectable 12.5 Gram(s) IV Push once  dextrose 50% Injectable 25 Gram(s) IV Push once  glucagon  Injectable 1 milliGRAM(s) IntraMuscular once  heparin   Injectable 5000 Unit(s) SubCutaneous every 8 hours  influenza   Vaccine 0.5 milliLiter(s) IntraMuscular once  insulin glargine Injectable (LANTUS) 14 Unit(s) SubCutaneous at bedtime  insulin lispro (ADMELOG) corrective regimen sliding scale   SubCutaneous three times a day before meals  insulin lispro (ADMELOG) corrective regimen sliding scale   SubCutaneous at bedtime  insulin lispro Injectable (ADMELOG) 2 Unit(s) SubCutaneous three times a day before meals  multivitamin 1 Tablet(s) Oral daily    MEDICATIONS  (PRN):  dextrose Oral Gel 15 Gram(s) Oral once PRN Blood Glucose LESS THAN 70 milliGRAM(s)/deciliter  oxycodone    5 mG/acetaminophen 325 mG 1 Tablet(s) Oral every 6 hours PRN Moderate Pain (4 - 6)  polyethylene glycol 3350 17 Gram(s) Oral daily PRN Constipation        I&O's Summary    05 Sep 2022 07:01  -  05 Sep 2022 17:17  --------------------------------------------------------  IN: 360 mL / OUT: 950 mL / NET: -590 mL        PHYSICAL EXAM:  Vital Signs Last 24 Hrs  T(C): 36.6 (05 Sep 2022 11:40), Max: 36.6 (05 Sep 2022 04:00)  T(F): 97.9 (05 Sep 2022 11:40), Max: 97.9 (05 Sep 2022 11:40)  HR: 72 (05 Sep 2022 11:40) (72 - 76)  BP: 132/73 (05 Sep 2022 11:40) (132/73 - 168/83)  BP(mean): --  RR: 18 (05 Sep 2022 11:40) (18 - 18)  SpO2: 97% (05 Sep 2022 11:40) (96% - 97%)    Parameters below as of 05 Sep 2022 11:40  Patient On (Oxygen Delivery Method): room air          CONSTITUTIONAL: NAD, sitting up in bed  ENMT: Moist oral mucosa, PERRLA, EOMI   RESPIRATORY: Normal respiratory effort; lungs are clear to auscultation bilaterally  CARDIOVASCULAR: Regular rate and rhythm, normal S1 and S2, + lower extremity edema b/l  ABDOMEN: Nontender to palpation, normoactive bowel sounds  MUSCULOSKELETAL:  No clubbing or cyanosis of digits, L knee swelling, no TTP   PSYCH: A+O to person, place, and time; affect appropriate  NEUROLOGY: No gross sensory or motor deficits   SKIN: No rashes; no palpable lesions      LABS:                        7.4    8.63  )-----------( 373      ( 05 Sep 2022 05:48 )             22.9     09-05    134<L>  |  98  |  28.6<H>  ----------------------------<  120<H>  4.6   |  27.0  |  1.87<H>    Ca    9.2      05 Sep 2022 05:48  Phos  3.5     09-04  Mg     1.7     09-04    TPro  6.3<L>  /  Alb  2.8<L>  /  TBili  0.4  /  DBili  x   /  AST  24  /  ALT  7   /  AlkPhos  300<H>  09-04    PT/INR - ( 03 Sep 2022 17:52 )   PT: 12.4 sec;   INR: 1.07 ratio         PTT - ( 03 Sep 2022 17:52 )  PTT:30.1 sec  CARDIAC MARKERS ( 05 Sep 2022 05:48 )  x     / 0.14 ng/mL / x     / x     / x      CARDIAC MARKERS ( 04 Sep 2022 09:15 )  x     / 0.12 ng/mL / x     / x     / x      CARDIAC MARKERS ( 04 Sep 2022 01:32 )  x     / 0.12 ng/mL / x     / x     / x      CARDIAC MARKERS ( 03 Sep 2022 17:52 )  x     / x     / 245 U/L / x     / 5.0 ng/mL      Urinalysis Basic - ( 05 Sep 2022 12:58 )    Color: Yellow / Appearance: Clear / S.010 / pH: x  Gluc: x / Ketone: Negative  / Bili: Negative / Urobili: Negative mg/dL   Blood: x / Protein: 30 mg/dL / Nitrite: Negative   Leuk Esterase: Negative / RBC: 0-2 /HPF / WBC 0-2 /HPF   Sq Epi: x / Non Sq Epi: x / Bacteria: Few        CAPILLARY BLOOD GLUCOSE      POCT Blood Glucose.: 118 mg/dL (05 Sep 2022 16:32)  POCT Blood Glucose.: 86 mg/dL (05 Sep 2022 12:16)  POCT Blood Glucose.: 112 mg/dL (05 Sep 2022 07:46)  POCT Blood Glucose.: 216 mg/dL (04 Sep 2022 21:43)

## 2022-09-05 NOTE — PROGRESS NOTE ADULT - ASSESSMENT
56 y/o with a history of diabetes, peripheral neuropathy came in to the ER for worsening lower ext. edema for past 1 + week, admitted for new onset CHF.     New onset of congestive heart failure  - Trop elevated, BNP >4200   - Telemetry monitoring  - TTE reviewed 8/26 EF 55-60%, diastolic function appears normal  - Strict I/O, daily weights   - IV Lasix 40 mg daily switched to Bumex   - Plan for LUIS in AM     Elevated troponin  - No chest pain  - Cardiology consult appreciated   - plan for stress test Wednesday     Bacteremia  - treated prior admission   - IV Cefazolin 2g q12h through 9/7 for MSSA bacteremia per ID     DM 2  - A1c 6.4  - FS with ISS  - Admelog 2 units TID with meals  - Lantus 14 units nightly     ESTUARDO vs CKD (chronic kidney disease)  - Avoid nephrotoxic medications  - Nephro consult appreciated     Anemia  - Hgb 7.4  - Chronic  - Monitor CBC    DVT ppx - Heparin SQ

## 2022-09-05 NOTE — PROGRESS NOTE ADULT - ASSESSMENT
56 y/o male pmh DM on Insulin, nephropathy, bilaterally foot ulcers form walking on hot pavement, multiple ulcers on finger tips in varying stages of healing, and a facial abscess drained at [facility]. Presented to [facility] with Increasing lower extremity edema and increasing sob.  Last Hospital admission was on 8/22/22 at [facility] for septic knee, sent home with midline for MSSA bacteremia on Rocephin 2 gram q 12 hrs to continue untill 09/07/22. c/o SOB x 6 mths, worse after recent admission, unable to climb flight of stairs wihtout dyspnea. + orthopnea. found to have ESTUARDO and anemia since last admission, + premature CAD hx in father, elevated troponin and bnp 4203

## 2022-09-05 NOTE — PROGRESS NOTE ADULT - SUBJECTIVE AND OBJECTIVE BOX
[facility]                                                         CARDIOLOGY AT [facility]                                                                  [address] [address]                                                         Telephone: [phone]. Fax:[phone]                                                                             PROGRESS NOTE    Reason for follow up:   Update:         Review of symptoms:   Cardiac:  No chest pain. No dyspnea. No palpitations.  Respiratory: no cough. No dyspnea  Gastrointestinal: No diarrhea. No abdominal pain. No bleeding.   Neuro: No focal neuro complaints.        Vitals:  T(C): 36.6 (09-05-22 @ 04:00), Max: 36.7 (09-04-22 @ 12:34)  HR: 76 (09-05-22 @ 04:00) (76 - 81)  BP: 168/83 (09-05-22 @ 04:00) (164/85 - 168/83)  RR: 18 (09-05-22 @ 04:00) (18 - 18)  SpO2: 96% (09-05-22 @ 04:00) (96% - 98%)        Weight (kg): 97.069 (09-03 @ 16:36)        PHYSICAL EXAM:  Appearance: Comfortable. No acute distress  HEENT:  Atraumatic. Normocephalic.  Normal oral mucosa  Neurologic: A & O x 3, no gross focal deficits.  Cardiovascular: RRR S1 S2, No murmur, no rubs/gallops. No JVD  Respiratory: Lungs clear to auscultation, unlabored   Gastrointestinal:  Soft, Non-tender, + BS  Lower Extremities: + edema  Psychiatry: Patient is calm. No agitation.   Skin: warm and dry.        CURRENT CARDIAC MEDICATIONS:  buMETAnide 1 milliGRAM(s) Oral two times a day        CURRENT OTHER MEDICATIONS:  ceFAZolin   IVPB 2000 milliGRAM(s) IV Intermittent every 12 hours  oxycodone    5 mG/acetaminophen 325 mG 1 Tablet(s) Oral every 6 hours PRN Moderate Pain (4 - 6)  polyethylene glycol 3350 17 Gram(s) Oral daily PRN Constipation  glucagon  Injectable 1 milliGRAM(s) IntraMuscular once, Stop order after: 1 Doses  insulin glargine Injectable (LANTUS) 14 Unit(s) SubCutaneous at bedtime  insulin lispro (ADMELOG) corrective regimen sliding scale   SubCutaneous three times a day before meals  insulin lispro (ADMELOG) corrective regimen sliding scale   SubCutaneous at bedtime  insulin lispro Injectable (ADMELOG) 2 Unit(s) SubCutaneous three times a day before meals  dextrose 5%. 1000 milliLiter(s) (50 mL/Hr) IV Continuous <Continuous>  heparin   Injectable 5000 Unit(s) SubCutaneous every 8 hours  influenza   Vaccine 0.5 milliLiter(s) IntraMuscular once  multivitamin 1 Tablet(s) Oral daily        LABS:	 	  ( 05 Sep 2022 05:48 )  Troponin T  0.14<H>,  CPK  X    , CKMB  X    , BNP X      , ( 04 Sep 2022 09:15 )  Troponin T  0.12<H>,  CPK  X    , CKMB  X    , BNP X      , ( 04 Sep 2022 01:32 )  Troponin T  0.12<H>,  CPK  X    , CKMB  X    , BNP X                              7.4    8.63  )-----------( 373      ( 05 Sep 2022 05:48 )             22.9     09-05    134<L>  |  98  |  28.6<H>  ----------------------------<  120<H>  4.6   |  27.0  |  1.87<H>    Ca    9.2      05 Sep 2022 05:48  Phos  3.5     09-04  Mg     1.7     09-04    TPro  6.3<L>  /  Alb  2.8<L>  /  TBili  0.4  /  DBili  x   /  AST  24  /  ALT  7   /  AlkPhos  300<H>  09-04    PT/INR/PTT ( 03 Sep 2022 17:52 )                       :                       :      12.4         :       30.1                  .        .                   .              .           .       1.07        .                                       Lipid Profile: Date: 09-04 @ 17:30  Total cholesterol 178; Direct LDL: --; HDL: 28; Triglycerides:135  Date: 09-04 @ 09:15  Total cholesterol 165; Direct LDL: --; HDL: 27; Triglycerides:122      TELEMETRY:       DIAGNOSTIC TESTING:  [ ] Echocardiogram:   [ ]  Catheterization:  [ ] Stress Test:    OTHER: 	                                                                [facility]                                                         CARDIOLOGY AT [facility]                                                                  [address] [facility] [id number] [place] [address]- [facility]                                                         Telephone: [phone]. Fax:[phone]                                                                             PROGRESS NOTE    Reason for follow up: Heart Failure   Update: States short of breath with exertion, Still with B/L LE edema, states getting better  Plan for LUIS tomorrow   Troponin slight elevation- early family hisotry CAD, will need ischemic work up (in-pt vs out-pt TBD)        Review of symptoms:   Cardiac:  No chest pain. No dyspnea. No palpitations.  Respiratory: no cough. No dyspnea  Gastrointestinal: No diarrhea. No abdominal pain. No bleeding.   Neuro: No focal neuro complaints.        Vitals:  T(C): 36.6 (09-05-22 @ 04:00), Max: 36.7 (09-04-22 @ 12:34)  HR: 76 (09-05-22 @ 04:00) (76 - 81)  BP: 168/83 (09-05-22 @ 04:00) (164/85 - 168/83)  RR: 18 (09-05-22 @ 04:00) (18 - 18)  SpO2: 96% (09-05-22 @ 04:00) (96% - 98%)        Weight (kg): 97.069 (09-03 @ 16:36)        PHYSICAL EXAM:  Appearance: Comfortable. No acute distress  HEENT:  Atraumatic. Normocephalic.  Normal oral mucosa  Neurologic: A & O x 3, no gross focal deficits.  Cardiovascular: RRR S1 S2, No murmur, no rubs/gallops. No JVD  Respiratory: Lungs clear to auscultation, unlabored   Gastrointestinal:  Soft, Non-tender, + BS  Lower Extremities: + edema  Psychiatry: Patient is calm. No agitation.   Skin: warm and dry.        CURRENT CARDIAC MEDICATIONS:  buMETAnide 1 milliGRAM(s) Oral two times a day        CURRENT OTHER MEDICATIONS:  ceFAZolin   IVPB 2000 milliGRAM(s) IV Intermittent every 12 hours  oxycodone    5 mG/acetaminophen 325 mG 1 Tablet(s) Oral every 6 hours PRN Moderate Pain (4 - 6)  polyethylene glycol 3350 17 Gram(s) Oral daily PRN Constipation  glucagon  Injectable 1 milliGRAM(s) IntraMuscular once, Stop order after: 1 Doses  insulin glargine Injectable (LANTUS) 14 Unit(s) SubCutaneous at bedtime  insulin lispro (ADMELOG) corrective regimen sliding scale   SubCutaneous three times a day before meals  insulin lispro (ADMELOG) corrective regimen sliding scale   SubCutaneous at bedtime  insulin lispro Injectable (ADMELOG) 2 Unit(s) SubCutaneous three times a day before meals  dextrose 5%. 1000 milliLiter(s) (50 mL/Hr) IV Continuous <Continuous>  heparin   Injectable 5000 Unit(s) SubCutaneous every 8 hours  influenza   Vaccine 0.5 milliLiter(s) IntraMuscular once  multivitamin 1 Tablet(s) Oral daily        LABS:	 	  ( 05 Sep 2022 05:48 )  Troponin T  0.14<H>,  CPK  X    , CKMB  X    , BNP X      , ( 04 Sep 2022 09:15 )  Troponin T  0.12<H>,  CPK  X    , CKMB  X    , BNP X      , ( 04 Sep 2022 01:32 )  Troponin T  0.12<H>,  CPK  X    , CKMB  X    , BNP X                              7.4    8.63  )-----------( 373      ( 05 Sep 2022 05:48 )             22.9     09-05    134<L>  |  98  |  28.6<H>  ----------------------------<  120<H>  4.6   |  27.0  |  1.87<H>    Ca    9.2      05 Sep 2022 05:48  Phos  3.5     09-04  Mg     1.7     09-04    TPro  6.3<L>  /  Alb  2.8<L>  /  TBili  0.4  /  DBili  x   /  AST  24  /  ALT  7   /  AlkPhos  300<H>  09-04    PT/INR/PTT ( 03 Sep 2022 17:52 )                       :                       :      12.4         :       30.1                  .        .                   .              .           .       1.07        .                                       Lipid Profile: Date: 09-04 @ 17:30  Total cholesterol 178; Direct LDL: --; HDL: 28; Triglycerides:135  Date: 09-04 @ 09:15  Total cholesterol 165; Direct LDL: --; HDL: 27; Triglycerides:122      TELEMETRY: SR      DIAGNOSTIC TESTING:  [ ] Echocardiogram:   < from: TTE Echo Complete w/o Contrast w/ Doppler (08.26.22 @ 15:59) >    Summary:   1. Left ventricular ejection fraction, by visual estimation, is 55 to   60%.   2. Normal global left ventricular systolic function.   3. Diastolic function appears normal.   4. Normal right ventricular size and function, inadequate estimation of   RVSP.   5. Mild thickening of the anterior and posterior mitral valve leaflets.   6. Trace mitral valve regurgitation.   7. Sclerotic aortic valve with normal opening.   8. Mild pulmonic valve regurgitation.   9. There is mild aortic root calcification.  10. Trivial pericardial effusion.  11. No obvious vegetation however TTE with lowsensivity, consider LUIS if   clinically indicated.    Sonido Patrick DO Electronically signed on 8/26/2022 at 5:26:15 PM    < end of copied text >                                                                  [facility]                                                         CARDIOLOGY AT [facility]                                                                  [address] [facility] [place] [place] [address]- [facility]                                                         Telephone: [phone]. Fax:[phone]                                                                             PROGRESS NOTE    Reason for follow up: Heart Failure   Update: States short of breath with exertion, Still with B/L LE edema, states getting better  Plan for LUIS tomorrow   Troponin slight elevation- early family hisotry CAD, will need ischemic work up (in-pt vs out-pt TBD)    Review of symptoms:   Cardiac:  No chest pain. No dyspnea. No palpitations.  Respiratory: no cough. No dyspnea  Gastrointestinal: No diarrhea. No abdominal pain. No bleeding.   Neuro: No focal neuro complaints.    Vitals:  T(C): 36.6 (09-05-22 @ 04:00), Max: 36.7 (09-04-22 @ 12:34)  HR: 76 (09-05-22 @ 04:00) (76 - 81)  BP: 168/83 (09-05-22 @ 04:00) (164/85 - 168/83)  RR: 18 (09-05-22 @ 04:00) (18 - 18)  SpO2: 96% (09-05-22 @ 04:00) (96% - 98%)    Weight (kg): 97.069 (09-03 @ 16:36)    PHYSICAL EXAM:  Appearance: Comfortable. No acute distress  HEENT:  Atraumatic. Normocephalic.  Normal oral mucosa  Neurologic: A & O x 3, no gross focal deficits.  Cardiovascular: RRR S1 S2, No murmur, no rubs/gallops. No JVD  Respiratory: Lungs clear to auscultation, unlabored   Gastrointestinal:  Soft, Non-tender, + BS  Lower Extremities: + edema  Psychiatry: Patient is calm. No agitation.   Skin: warm and dry.    CURRENT CARDIAC MEDICATIONS:  buMETAnide 1 milliGRAM(s) Oral two times a day  CURRENT OTHER MEDICATIONS:  ceFAZolin   IVPB 2000 milliGRAM(s) IV Intermittent every 12 hours  oxycodone    5 mG/acetaminophen 325 mG 1 Tablet(s) Oral every 6 hours PRN Moderate Pain (4 - 6)  polyethylene glycol 3350 17 Gram(s) Oral daily PRN Constipation  glucagon  Injectable 1 milliGRAM(s) IntraMuscular once, Stop order after: 1 Doses  insulin glargine Injectable (LANTUS) 14 Unit(s) SubCutaneous at bedtime  insulin lispro (ADMELOG) corrective regimen sliding scale   SubCutaneous three times a day before meals  insulin lispro (ADMELOG) corrective regimen sliding scale   SubCutaneous at bedtime  insulin lispro Injectable (ADMELOG) 2 Unit(s) SubCutaneous three times a day before meals  dextrose 5%. 1000 milliLiter(s) (50 mL/Hr) IV Continuous <Continuous>  heparin   Injectable 5000 Unit(s) SubCutaneous every 8 hours  influenza   Vaccine 0.5 milliLiter(s) IntraMuscular once  multivitamin 1 Tablet(s) Oral daily      LABS:	 	  ( 05 Sep 2022 05:48 )  Troponin T  0.14<H>,  CPK  X    , CKMB  X    , BNP X      , ( 04 Sep 2022 09:15 )  Troponin T  0.12<H>,  CPK  X    , CKMB  X    , BNP X      , ( 04 Sep 2022 01:32 )  Troponin T  0.12<H>,  CPK  X    , CKMB  X    , BNP X                              7.4    8.63  )-----------( 373      ( 05 Sep 2022 05:48 )             22.9     09-05    134<L>  |  98  |  28.6<H>  ----------------------------<  120<H>  4.6   |  27.0  |  1.87<H>    Ca    9.2      05 Sep 2022 05:48  Phos  3.5     09-04  Mg     1.7     09-04    TPro  6.3<L>  /  Alb  2.8<L>  /  TBili  0.4  /  DBili  x   /  AST  24  /  ALT  7   /  AlkPhos  300<H>  09-04    PT/INR/PTT ( 03 Sep 2022 17:52 )                       :                       :      12.4         :       30.1                  .        .                   .              .           .       1.07        .                                       Lipid Profile: Date: 09-04 @ 17:30  Total cholesterol 178; Direct LDL: --; HDL: 28; Triglycerides:135  Date: 09-04 @ 09:15  Total cholesterol 165; Direct LDL: --; HDL: 27; Triglycerides:122      TELEMETRY: SR      DIAGNOSTIC TESTING:  [ ] Echocardiogram:   < from: TTE Echo Complete w/o Contrast w/ Doppler (08.26.22 @ 15:59) >    Summary:   1. Left ventricular ejection fraction, by visual estimation, is 55 to   60%.   2. Normal global left ventricular systolic function.   3. Diastolic function appears normal.   4. Normal right ventricular size and function, inadequate estimation of   RVSP.   5. Mild thickening of the anterior and posterior mitral valve leaflets.   6. Trace mitral valve regurgitation.   7. Sclerotic aortic valve with normal opening.   8. Mild pulmonic valve regurgitation.   9. There is mild aortic root calcification.  10. Trivial pericardial effusion.  11. No obvious vegetation however TTE with lowsensivity, consider LUIS if   clinically indicated.    Sonido Patrick DO Electronically signed on 8/26/2022 at 5:26:15 PM    < end of copied text >

## 2022-09-06 LAB
ALBUMIN, RANDOM URINE: 31 MG/DL — SIGNIFICANT CHANGE UP
ALBUMIN/CREATININE RATIO (ACR): 657 MG/G — HIGH (ref 0–30)
ANION GAP SERPL CALC-SCNC: 10 MMOL/L — SIGNIFICANT CHANGE UP (ref 5–17)
BASOPHILS # BLD AUTO: 0.04 K/UL — SIGNIFICANT CHANGE UP (ref 0–0.2)
BASOPHILS NFR BLD AUTO: 0.5 % — SIGNIFICANT CHANGE UP (ref 0–2)
BUN SERPL-MCNC: 33.4 MG/DL — HIGH (ref 8–20)
CALCIUM SERPL-MCNC: 9.5 MG/DL — SIGNIFICANT CHANGE UP (ref 8.4–10.5)
CHLORIDE SERPL-SCNC: 96 MMOL/L — LOW (ref 98–107)
CO2 SERPL-SCNC: 29 MMOL/L — SIGNIFICANT CHANGE UP (ref 22–29)
CREAT ?TM UR-MCNC: 47 MG/DL — SIGNIFICANT CHANGE UP
CREAT SERPL-MCNC: 2.12 MG/DL — HIGH (ref 0.5–1.3)
EGFR: 36 ML/MIN/1.73M2 — LOW
EOSINOPHIL # BLD AUTO: 0.19 K/UL — SIGNIFICANT CHANGE UP (ref 0–0.5)
EOSINOPHIL NFR BLD AUTO: 2.3 % — SIGNIFICANT CHANGE UP (ref 0–6)
GLUCOSE BLDC GLUCOMTR-MCNC: 108 MG/DL — HIGH (ref 70–99)
GLUCOSE BLDC GLUCOMTR-MCNC: 184 MG/DL — HIGH (ref 70–99)
GLUCOSE BLDC GLUCOMTR-MCNC: 64 MG/DL — LOW (ref 70–99)
GLUCOSE BLDC GLUCOMTR-MCNC: 82 MG/DL — SIGNIFICANT CHANGE UP (ref 70–99)
GLUCOSE SERPL-MCNC: 92 MG/DL — SIGNIFICANT CHANGE UP (ref 70–99)
HCT VFR BLD CALC: 25 % — LOW (ref 39–50)
HGB BLD-MCNC: 8.1 G/DL — LOW (ref 13–17)
IMM GRANULOCYTES NFR BLD AUTO: 0.5 % — SIGNIFICANT CHANGE UP (ref 0–1.5)
LYMPHOCYTES # BLD AUTO: 0.95 K/UL — LOW (ref 1–3.3)
LYMPHOCYTES # BLD AUTO: 11.4 % — LOW (ref 13–44)
MAGNESIUM SERPL-MCNC: 1.7 MG/DL — LOW (ref 1.8–2.6)
MCHC RBC-ENTMCNC: 28.9 PG — SIGNIFICANT CHANGE UP (ref 27–34)
MCHC RBC-ENTMCNC: 32.4 GM/DL — SIGNIFICANT CHANGE UP (ref 32–36)
MCV RBC AUTO: 89.3 FL — SIGNIFICANT CHANGE UP (ref 80–100)
MONOCYTES # BLD AUTO: 0.56 K/UL — SIGNIFICANT CHANGE UP (ref 0–0.9)
MONOCYTES NFR BLD AUTO: 6.7 % — SIGNIFICANT CHANGE UP (ref 2–14)
NEUTROPHILS # BLD AUTO: 6.52 K/UL — SIGNIFICANT CHANGE UP (ref 1.8–7.4)
NEUTROPHILS NFR BLD AUTO: 78.6 % — HIGH (ref 43–77)
PHOSPHATE SERPL-MCNC: 4.1 MG/DL — SIGNIFICANT CHANGE UP (ref 2.4–4.7)
PLATELET # BLD AUTO: 356 K/UL — SIGNIFICANT CHANGE UP (ref 150–400)
POTASSIUM SERPL-MCNC: 4.5 MMOL/L — SIGNIFICANT CHANGE UP (ref 3.5–5.3)
POTASSIUM SERPL-SCNC: 4.5 MMOL/L — SIGNIFICANT CHANGE UP (ref 3.5–5.3)
RBC # BLD: 2.8 M/UL — LOW (ref 4.2–5.8)
RBC # FLD: 12.3 % — SIGNIFICANT CHANGE UP (ref 10.3–14.5)
SARS-COV-2 RNA SPEC QL NAA+PROBE: SIGNIFICANT CHANGE UP
SODIUM SERPL-SCNC: 135 MMOL/L — SIGNIFICANT CHANGE UP (ref 135–145)
WBC # BLD: 8.3 K/UL — SIGNIFICANT CHANGE UP (ref 3.8–10.5)
WBC # FLD AUTO: 8.3 K/UL — SIGNIFICANT CHANGE UP (ref 3.8–10.5)

## 2022-09-06 PROCEDURE — 99232 SBSQ HOSP IP/OBS MODERATE 35: CPT

## 2022-09-06 PROCEDURE — 93320 DOPPLER ECHO COMPLETE: CPT | Mod: 26

## 2022-09-06 PROCEDURE — 93325 DOPPLER ECHO COLOR FLOW MAPG: CPT | Mod: 26

## 2022-09-06 PROCEDURE — 93312 ECHO TRANSESOPHAGEAL: CPT | Mod: 26

## 2022-09-06 PROCEDURE — 99233 SBSQ HOSP IP/OBS HIGH 50: CPT

## 2022-09-06 RX ORDER — MAGNESIUM SULFATE 500 MG/ML
1 VIAL (ML) INJECTION ONCE
Refills: 0 | Status: COMPLETED | OUTPATIENT
Start: 2022-09-06 | End: 2022-09-06

## 2022-09-06 RX ORDER — DEXTROSE 50 % IN WATER 50 %
25 SYRINGE (ML) INTRAVENOUS ONCE
Refills: 0 | Status: DISCONTINUED | OUTPATIENT
Start: 2022-09-06 | End: 2022-09-06

## 2022-09-06 RX ORDER — DEXTROSE 10 % IN WATER 10 %
250 INTRAVENOUS SOLUTION INTRAVENOUS ONCE
Refills: 0 | Status: COMPLETED | OUTPATIENT
Start: 2022-09-06 | End: 2022-09-06

## 2022-09-06 RX ORDER — BUMETANIDE 0.25 MG/ML
1 INJECTION INTRAMUSCULAR; INTRAVENOUS DAILY
Refills: 0 | Status: DISCONTINUED | OUTPATIENT
Start: 2022-09-07 | End: 2022-09-08

## 2022-09-06 RX ADMIN — Medication 2 UNIT(S): at 17:53

## 2022-09-06 RX ADMIN — Medication 1000 MILLILITER(S): at 12:10

## 2022-09-06 RX ADMIN — HEPARIN SODIUM 5000 UNIT(S): 5000 INJECTION INTRAVENOUS; SUBCUTANEOUS at 05:09

## 2022-09-06 RX ADMIN — OXYCODONE AND ACETAMINOPHEN 1 TABLET(S): 5; 325 TABLET ORAL at 21:24

## 2022-09-06 RX ADMIN — Medication 100 MILLIGRAM(S): at 17:53

## 2022-09-06 RX ADMIN — Medication 100 MILLIGRAM(S): at 05:10

## 2022-09-06 RX ADMIN — Medication 100 GRAM(S): at 16:18

## 2022-09-06 RX ADMIN — HEPARIN SODIUM 5000 UNIT(S): 5000 INJECTION INTRAVENOUS; SUBCUTANEOUS at 21:20

## 2022-09-06 RX ADMIN — BUMETANIDE 1 MILLIGRAM(S): 0.25 INJECTION INTRAMUSCULAR; INTRAVENOUS at 05:09

## 2022-09-06 RX ADMIN — INSULIN GLARGINE 14 UNIT(S): 100 INJECTION, SOLUTION SUBCUTANEOUS at 21:21

## 2022-09-06 RX ADMIN — Medication 1 TABLET(S): at 13:03

## 2022-09-06 NOTE — PROGRESS NOTE ADULT - ASSESSMENT
ESTUARDO vs CKD (chronic kidney disease):   - Avoid nephrotoxic medications  - Scr.,  is 1.9  today, will continue to monitor ,    Hold diuretics for now,     Anemia  - Monitor CBC

## 2022-09-06 NOTE — PROGRESS NOTE ADULT - ASSESSMENT
A/P: 58 y/o male pmh DM on Insulin, nephropathy, bilaterally foot ulcers form walking on hot pavement, multiple ulcers on finger tips in varying stages of healing, and a facial abscess drained at [facility]. Presented to [facility] with Increasing lower extremity edema and increasing sob.  Last Hospital admission was on 8/22/22 at [facility] for septic knee, sent home with midline for MSSA bacteremia on Rocephin 2 gram q 12 hrs to continue untill 09/07/22. c/o SOB x 6 mths, worse after recent admission, unable to climb flight of stairs wihtout dyspnea. + orthopnea. found to have ESTUARDO and anemia since last admission, + premature CAD hx in father, elevated troponin and bnp 4203

## 2022-09-06 NOTE — PROGRESS NOTE ADULT - ASSESSMENT
56 y/o with a history of diabetes, peripheral neuropathy came in to the ER for worsening lower ext. edema for past 1 + week, admitted for new onset CHF.     New onset of congestive heart failure:   - Trop elevated, BNP >4200   - Telemetry monitoring  - TTE reviewed 8/26 EF 55-60%, diastolic function appears normal  - Strict I/O, daily weights   - IV Lasix 40 mg daily switched to Bumex   - Plan for LUIS today    Elevated troponin:   - No chest pain  - Cardiology consult appreciated   - plan for stress test    Bacteremia:   - treated prior admission   - IV Cefazolin 2g q12h through 9/7 for MSSA bacteremia per ID, will continue   if spike fever will repeat cultures    DM 2:   - A1c 6.4  - FS with ISS  - Admelog 2 units TID with meals  - Lantus 14 units nightly     ESTUARDO vs CKD (chronic kidney disease):   - Avoid nephrotoxic medications  - Nephro consult appreciated   - creatrinine is 2.2 today, will continue to monitor will discuss with Nephrology about diuretics and worsening kidney function    Anemia  - Hgb 7.4 today Hb is 8.1  - Monitor CBC    DVT ppx - Heparin SQ

## 2022-09-06 NOTE — PROGRESS NOTE ADULT - SUBJECTIVE AND OBJECTIVE BOX
HAILEY CARD    [id number]    57y      Male    Patient is a 57y old  Male who presents with a chief complaint of new onset chf (06 Sep 2022 15:00)      INTERVAL HPI/OVERNIGHT EVENTS:    patient is feeling ok, denies any worsening of SOB, no overnight issues, denies chest pain, nausea, vomiting    REVIEW OF SYSTEMS:    CONSTITUTIONAL: No fever, fatigue  RESPIRATORY: No cough, No shortness of breath  CARDIOVASCULAR: No chest pain, palpitations  GASTROINTESTINAL: No abdominal, No nausea, vomiting  NEUROLOGICAL: No headaches,  loss of strength.  MISCELLANEOUS: No joint swelling or pain       Vital Signs Last 24 Hrs  T(C): 36.7 (06 Sep 2022 14:34), Max: 36.8 (05 Sep 2022 17:53)  T(F): 98 (06 Sep 2022 14:34), Max: 98.3 (05 Sep 2022 17:53)  HR: 75 (06 Sep 2022 14:34) (74 - 79)  BP: 174/83 (06 Sep 2022 14:34) (148/74 - 179/92)  BP(mean): --  RR: 16 (06 Sep 2022 14:34) (16 - 20)  SpO2: 98% (06 Sep 2022 14:34) (94% - 98%)    Parameters below as of 06 Sep 2022 14:34  Patient On (Oxygen Delivery Method): room air        PHYSICAL EXAM:    GENERAL: Middle age male looking comfortable    HEENT: PERRL, +EOMI  NECK: soft, Supple, No JVD   CHEST/LUNG: Decrease air entry bilaterally; No wheezing  HEART: S1S2+, Regular rate and rhythm; No murmurs  ABDOMEN: Soft, Nontender, Nondistended; Bowel sounds present  EXTREMITIES:  1+ Peripheral Pulses, some edema  SKIN: No rashes or lesions  NEURO: AAOX3, no focal deficits  PSYCH: normal mood      LABS:                        8.1    8.30  )-----------( 356      ( 06 Sep 2022 05:40 )             25.0     09-06    135  |  96<L>  |  33.4<H>  ----------------------------<  92  4.5   |  29.0  |  2.12<H>    Ca    9.5      06 Sep 2022 05:40  Phos  4.1     09-06  Mg     1.7         TPro  6.3<L>  /  Alb  2.8<L>  /  TBili  0.4  /  DBili  x   /  AST  24  /  ALT  7   /  AlkPhos  300<H>        Urinalysis Basic - ( 05 Sep 2022 12:58 )    Color: Yellow / Appearance: Clear / S.010 / pH: x  Gluc: x / Ketone: Negative  / Bili: Negative / Urobili: Negative mg/dL   Blood: x / Protein: 30 mg/dL / Nitrite: Negative   Leuk Esterase: Negative / RBC: 0-2 /HPF / WBC 0-2 /HPF   Sq Epi: x / Non Sq Epi: x / Bacteria: Few          I&O's Summary    05 Sep 2022 07:01  -  06 Sep 2022 07:00  --------------------------------------------------------  IN: 360 mL / OUT: 3280 mL / NET: -2920 mL    06 Sep 2022 07:  -  06 Sep 2022 16:51  --------------------------------------------------------  IN: 0 mL / OUT: 400 mL / NET: -400 mL        MEDICATIONS  (STANDING):  ceFAZolin   IVPB 2000 milliGRAM(s) IV Intermittent every 12 hours  dextrose 5%. 1000 milliLiter(s) (100 mL/Hr) IV Continuous <Continuous>  dextrose 5%. 1000 milliLiter(s) (50 mL/Hr) IV Continuous <Continuous>  dextrose 50% Injectable 25 Gram(s) IV Push once  dextrose 50% Injectable 12.5 Gram(s) IV Push once  dextrose 50% Injectable 25 Gram(s) IV Push once  glucagon  Injectable 1 milliGRAM(s) IntraMuscular once  heparin   Injectable 5000 Unit(s) SubCutaneous every 8 hours  influenza   Vaccine 0.5 milliLiter(s) IntraMuscular once  insulin glargine Injectable (LANTUS) 14 Unit(s) SubCutaneous at bedtime  insulin lispro (ADMELOG) corrective regimen sliding scale   SubCutaneous three times a day before meals  insulin lispro (ADMELOG) corrective regimen sliding scale   SubCutaneous at bedtime  insulin lispro Injectable (ADMELOG) 2 Unit(s) SubCutaneous three times a day before meals  multivitamin 1 Tablet(s) Oral daily    MEDICATIONS  (PRN):  dextrose Oral Gel 15 Gram(s) Oral once PRN Blood Glucose LESS THAN 70 milliGRAM(s)/deciliter  oxycodone    5 mG/acetaminophen 325 mG 1 Tablet(s) Oral every 6 hours PRN Moderate Pain (4 - 6)  polyethylene glycol 3350 17 Gram(s) Oral daily PRN Constipation

## 2022-09-06 NOTE — PROGRESS NOTE ADULT - SUBJECTIVE AND OBJECTIVE BOX
[facility]                                                         CARDIOLOGY AT [place] [address]                                                         Telephone: [phone]. Fax:[phone]                                                                             PROGRESS NOTE    Reason for follow up: Acute Decompensated HFpEF  Update: Patient awaiting LUIS today for MSSA bacteremia. Will plan for nuclear stress testing tomorrow. Patient endorses hunger from being NPO.      Review of symptoms:   Cardiac:  No chest pain. No dyspnea. No palpitations.  Respiratory: no cough. No dyspnea  Gastrointestinal: No diarrhea. No abdominal pain. No bleeding.   Neuro: No focal neuro complaints.    Vitals:  T(C): 36.6 (09-06-22 @ 08:39), Max: 36.8 (09-05-22 @ 17:53)  HR: 79 (09-06-22 @ 08:39) (72 - 79)  BP: 148/74 (09-06-22 @ 08:39) (132/73 - 179/92)  RR: 18 (09-06-22 @ 08:39) (18 - 20)  SpO2: 94% (09-06-22 @ 08:39) (94% - 97%)  Wt(kg): --  I&O's Summary    05 Sep 2022 07:01  -  06 Sep 2022 07:00  --------------------------------------------------------  IN: 360 mL / OUT: 3280 mL / NET: -2920 mL      Weight (kg): 97.069 (09-03 @ 16:36)    PHYSICAL EXAM:  Appearance: Comfortable. No acute distress  HEENT:  Atraumatic. Normocephalic.  Normal oral mucosa  Neurologic: A & O x 3, no gross focal deficits.  Cardiovascular: RRR S1 S2, No murmur, no rubs/gallops. No JVD  Respiratory: Lungs clear to auscultation, unlabored   Gastrointestinal:  Soft, Non-tender, + BS  Lower Extremities: + edema  Psychiatry: Patient is calm. No agitation.   Skin: warm and dry.    CURRENT CARDIAC MEDICATIONS:      CURRENT OTHER MEDICATIONS:  ceFAZolin   IVPB 2000 milliGRAM(s) IV Intermittent every 12 hours  oxycodone    5 mG/acetaminophen 325 mG 1 Tablet(s) Oral every 6 hours PRN Moderate Pain (4 - 6)  polyethylene glycol 3350 17 Gram(s) Oral daily PRN Constipation  dextrose 50% Injectable 25 Gram(s) IV Push once, Stop order after: 1 Doses  dextrose 50% Injectable 12.5 Gram(s) IV Push once, Stop order after: 1 Doses  dextrose 50% Injectable 25 Gram(s) IV Push once, Stop order after: 1 Doses  dextrose Oral Gel 15 Gram(s) Oral once, Stop order after: 1 Doses PRN Blood Glucose LESS THAN 70 milliGRAM(s)/deciliter  glucagon  Injectable 1 milliGRAM(s) IntraMuscular once, Stop order after: 1 Doses  insulin glargine Injectable (LANTUS) 14 Unit(s) SubCutaneous at bedtime  insulin lispro (ADMELOG) corrective regimen sliding scale   SubCutaneous three times a day before meals  insulin lispro (ADMELOG) corrective regimen sliding scale   SubCutaneous at bedtime  insulin lispro Injectable (ADMELOG) 2 Unit(s) SubCutaneous three times a day before meals  dextrose 5%. 1000 milliLiter(s) (50 mL/Hr) IV Continuous <Continuous>  dextrose 5%. 1000 milliLiter(s) (100 mL/Hr) IV Continuous <Continuous>  heparin   Injectable 5000 Unit(s) SubCutaneous every 8 hours  influenza   Vaccine 0.5 milliLiter(s) IntraMuscular once  multivitamin 1 Tablet(s) Oral daily      LABS:	 	  ( 05 Sep 2022 05:48 )  Troponin T  0.14<H>,  CPK  X    , CKMB  X    , BNP X        , ( 04 Sep 2022 09:15 )  Troponin T  0.12<H>,  CPK  X    , CKMB  X    , BNP X        , ( 04 Sep 2022 01:32 )  Troponin T  0.12<H>,  CPK  X    , CKMB  X    , BNP X                                  8.1    8.30  )-----------( 356      ( 06 Sep 2022 05:40 )             25.0     09-06    135  |  96<L>  |  33.4<H>  ----------------------------<  92  4.5   |  29.0  |  2.12<H>    Ca    9.5      06 Sep 2022 05:40  Phos  4.1     09-06  Mg     1.7     09-06    TPro  6.3<L>  /  Alb  2.8<L>  /  TBili  0.4  /  DBili  x   /  AST  24  /  ALT  7   /  AlkPhos  300<H>  09-04    PT/INR/PTT ( 03 Sep 2022 17:52 )                       :                       :      12.4         :       30.1                  .        .                   .              .           .       1.07        .                                       Lipid Profile: Date: 09-04 @ 17:30  Total cholesterol 178; Direct LDL: --; HDL: 28; Triglycerides:135  Date: 09-04 @ 09:15  Total cholesterol 165; Direct LDL: --; HDL: 27; Triglycerides:122    HgA1c:   TSH:     TELEMETRY: SR  ECG:    DIAGNOSTIC TESTING:  [ ] Echocardiogram:   < from: TTE Echo Complete w/o Contrast w/ Doppler (08.26.22 @ 15:59) >  PHYSICIAN INTERPRETATION:  Left Ventricle: The left ventricular internal cavity size is normal.  Global LV systolic function was normal. Left ventricular ejection   fraction, by visual estimation, is 55 to 60%. Spectral Doppler shows   normal pattern of LV diastolic filling.  Right Ventricle: Normal right ventricular size and function.  Left Atrium: Normal left atrial size.  Right Atrium: Normal right atrial size.  Pericardium: Trivial pericardial effusion is present.  Mitral Valve: Mild thickening of the anterior and posterior mitral valve   leaflets. Trace mitral valve regurgitation is seen.  Tricuspid Valve: The tricuspid valve is normal in structure. Trivial   tricuspid regurgitation is visualized. Adequate TR velocity was not   obtained to accurately assess RVSP.  Aortic Valve: The aortic valve is trileaflet. Sclerotic aortic valve with   normal opening. No evidence of aortic valve regurgitation is seen.  Pulmonic Valve: The pulmonic valve is normal. Mild pulmonic valve   regurgitation.  Aorta: The aortic root is normal in size and structure. There is mild   aortic root calcification.  Pulmonary Artery: Thepulmonary artery is of normal size and origin.  Venous: The pulmonary veins appear normal. The inferior vena cava is 1.8   cm. The inferior vena cava was normal sized, with respiratory size   variation greater than 50%.  In comparison to the previousechocardiogram(s): There are no prior   studies on this patient for comparison purposes. No obvious vegetation,   consider LUIS if clinically indicated.      Summary:   1. Left ventricular ejection fraction, by visual estimation, is 55 to   60%.   2. Normal global left ventricular systolic function.   3. Diastolic function appears normal.   4. Normal right ventricular size and function, inadequate estimation of   RVSP.   5. Mild thickening of the anterior and posterior mitral valve leaflets.   6. Trace mitral valve regurgitation.   7. Sclerotic aortic valve with normal opening.   8. Mild pulmonic valve regurgitation.   9. There is mild aortic root calcification.  10. Trivial pericardial effusion.  11. No obvious vegetation however TTE with lowsensivity, consider LUIS if   clinically indicated.    Sonido Patrick DO Electronically signed on 8/26/2022 at 5:26:15 PM      < end of copied text >    [ ]  Catheterization:  [ ] Stress Test:    OTHER:

## 2022-09-06 NOTE — PROGRESS NOTE ADULT - SUBJECTIVE AND OBJECTIVE BOX
Department of Cardiology                                                                  [facility]/[place] [address] [address] [facility]                                                            Telephone: [phone]. Fax:[phone]                                                                        CARIOLOGY PRE LUIS NOTE       57y Male here for a LUIS.     Indication:   evaluate Bacteremia       HPI:  56 y/o male came to the ER for worsening lower ext. edema for past 1 + week, also reports that gets sob when lying at night, feels water in chest and that is going on for 6 months. no sob at rest. pt. was recently discharged from [facility] with LUE midline for MSSA bacteremia. pt. is on cefazolin 2 gram q 12 hrs till 09/07/22. pt. reports no cp, no fever, no abd. pain, no n/v/d. no hematemesis, no hemoptysis, no rectal bleed. pt. is on long acting insulin Tresiba, pt. stated that in the hospital his units were lowered 50 % as his BG was running low.  (04 Sep 2022 03:52)      Echo:     < from: TTE Echo Complete w/o Contrast w/ Doppler (08.26.22 @ 15:59) >  PHYSICIAN INTERPRETATION:  Left Ventricle: The left ventricular internal cavity size is normal.  Global LV systolic function was normal. Left ventricular ejection   fraction, by visual estimation, is 55 to 60%. Spectral Doppler shows   normal pattern of LV diastolic filling.  Right Ventricle: Normal right ventricular size and function.  Left Atrium: Normal left atrial size.  Right Atrium: Normal right atrial size.  Pericardium: Trivial pericardial effusion is present.  Mitral Valve: Mild thickening of the anterior and posterior mitral valve   leaflets. Trace mitral valve regurgitation is seen.  Tricuspid Valve: The tricuspid valve is normal in structure. Trivial   tricuspid regurgitation is visualized. Adequate TR velocity was not   obtained to accurately assess RVSP.  Aortic Valve: The aortic valve is trileaflet. Sclerotic aortic valve with   normal opening. No evidence of aortic valve regurgitation is seen.  Pulmonic Valve: The pulmonic valve is normal. Mild pulmonic valve   regurgitation.  Aorta: The aortic root is normal in size and structure. There is mild   aortic root calcification.  Pulmonary Artery: Thepulmonary artery is of normal size and origin.  Venous: The pulmonary veins appear normal. The inferior vena cava is 1.8   cm. The inferior vena cava was normal sized, with respiratory size   variation greater than 50%.  In comparison to the previousechocardiogram(s): There are no prior   studies on this patient for comparison purposes. No obvious vegetation,   consider LUIS if clinically indicated.      Summary:   1. Left ventricular ejection fraction, by visual estimation, is 55 to   60%.   2. Normal global left ventricular systolic function.   3. Diastolic function appears normal.   4. Normal right ventricular size and function, inadequate estimation of   RVSP.   5. Mild thickening of the anterior and posterior mitral valve leaflets.   6. Trace mitral valve regurgitation.   7. Sclerotic aortic valve with normal opening.   8. Mild pulmonic valve regurgitation.   9. There is mild aortic root calcification.  10. Trivial pericardial effusion.  11. No obvious vegetation however TTE with lowsensivity, consider LUIS if   clinically indicated.    Sonido Patrick DO Electronically signed on 8/26/2022 at 5:26:15 PM    *** Final ***    < end of copied text >      Physical Exam:     General: No distress. Comfortable.  HEENT: EOM intact.  Neck: Neck supple. JVP not elevated. No masses  Chest: Clear to auscultation bilaterally  CV: s1 s2 RRR no murmurs, rubs or gallops   Abdomen: Soft, non-distended, non-tender  Extremity : + PP no c/c/e   Skin: No rashes or skin breakdown  Neurology: Alert and oriented times three. Sensation intact  Psych: Affect normal                          8.1    8.30  )-----------( 356      ( 06 Sep 2022 05:40 )             25.0     09-06    135  |  96<L>  |  33.4<H>  ----------------------------<  92  4.5   |  29.0  |  2.12<H>    Ca    9.5      06 Sep 2022 05:40  Phos  4.1     09-06  Mg     1.7     09-06    TPro  6.3<L>  /  Alb  2.8<L>  /  TBili  0.4  /  DBili  x   /  AST  24  /  ALT  7   /  AlkPhos  300<H>  09-04      ASSESSMENT AND PLAN:    57 y/omale, PHM:  DM on Insulin, nephropathy, bilaterally foot ulcers form walking on hot pavement, multiple ulcers on finger tips in varying stages of healing, and a facial abscess drained at [facility], presented to [facility] with Increasing lower extremity edema and increasing sob.  Last Hospital admission was on 8/22/22 at [facility] for septic knee, sent home with midline for MSSA bacteremia on Rocephin    Rocephin 2 gram q 12 hrs to continue untill 09/07/22.   Patient states after discharge he noted the knee swelling was traveling down into his ankle and up over knee cap.  Then over the next few days at home his right ankle began to swell and felt warm as well.   Patient also states that his SOB has increased.  He was SOB x 6 months prior while lifting at work, but after hospital admission he is now unable to walk up a flight of stairs with feeling SOB that hd increased over the 4 days at home.  Also states he as difficulty lying down flat and feeling like he is choking.   Denies cp, palpitations,   fever, chills, jaw pain, back pain, abd. pain, n/v/d, hematemesis, hemoptysis, and rectal bleeding   A cardiac consult was called due to symptoms concerning for CHF and elevated troponin levels.  Patient has never seen a cardiologist, he is non smoker, has bad poor dentition, and  but found to have ESTUARDO and anemia since last admission which are new diagnosis's.  Patient also has a + premature CAD hx in father.  Patient also has a + premature CAD hx in father, elevated trops x 2, and bnp 4203.      -pt has been NPO since MN  -labs, ekg ordered  -consent to be obtained by attending and anesthesia  -LUIS order placed                                                                             Department of Cardiology                                                                  [facility]/[place] [address] [address] [facility]                                                            Telephone: [phone]. Fax:[phone]                                                                        CARIOLOGY PRE LUIS NOTE       57y Male here for a LUIS.     Indication:   evaluate Bacteremia       HPI:  58 y/o male came to the ER for worsening lower ext. edema for past 1 + week, also reports that gets sob when lying at night, feels water in chest and that is going on for 6 months. no sob at rest. pt. was recently discharged from [facility] with LUE midline for MSSA bacteremia. pt. is on cefazolin 2 gram q 12 hrs till 09/07/22. pt. reports no cp, no fever, no abd. pain, no n/v/d. no hematemesis, no hemoptysis, no rectal bleed. pt. is on long acting insulin Tresiba, pt. stated that in the hospital his units were lowered 50 % as his BG was running low.  (04 Sep 2022 03:52)      Echo:     < from: TTE Echo Complete w/o Contrast w/ Doppler (08.26.22 @ 15:59) >  PHYSICIAN INTERPRETATION:  Left Ventricle: The left ventricular internal cavity size is normal.  Global LV systolic function was normal. Left ventricular ejection   fraction, by visual estimation, is 55 to 60%. Spectral Doppler shows   normal pattern of LV diastolic filling.  Right Ventricle: Normal right ventricular size and function.  Left Atrium: Normal left atrial size.  Right Atrium: Normal right atrial size.  Pericardium: Trivial pericardial effusion is present.  Mitral Valve: Mild thickening of the anterior and posterior mitral valve   leaflets. Trace mitral valve regurgitation is seen.  Tricuspid Valve: The tricuspid valve is normal in structure. Trivial   tricuspid regurgitation is visualized. Adequate TR velocity was not   obtained to accurately assess RVSP.  Aortic Valve: The aortic valve is trileaflet. Sclerotic aortic valve with   normal opening. No evidence of aortic valve regurgitation is seen.  Pulmonic Valve: The pulmonic valve is normal. Mild pulmonic valve   regurgitation.  Aorta: The aortic root is normal in size and structure. There is mild   aortic root calcification.  Pulmonary Artery: Thepulmonary artery is of normal size and origin.  Venous: The pulmonary veins appear normal. The inferior vena cava is 1.8   cm. The inferior vena cava was normal sized, with respiratory size   variation greater than 50%.  In comparison to the previousechocardiogram(s): There are no prior   studies on this patient for comparison purposes. No obvious vegetation,   consider LUIS if clinically indicated.      Summary:   1. Left ventricular ejection fraction, by visual estimation, is 55 to   60%.   2. Normal global left ventricular systolic function.   3. Diastolic function appears normal.   4. Normal right ventricular size and function, inadequate estimation of   RVSP.   5. Mild thickening of the anterior and posterior mitral valve leaflets.   6. Trace mitral valve regurgitation.   7. Sclerotic aortic valve with normal opening.   8. Mild pulmonic valve regurgitation.   9. There is mild aortic root calcification.  10. Trivial pericardial effusion.  11. No obvious vegetation however TTE with lowsensivity, consider LUIS if   clinically indicated.    Sonido Patrick DO Electronically signed on 8/26/2022 at 5:26:15 PM    *** Final ***    < end of copied text >      Physical Exam:     General: No distress. Comfortable.  HEENT: EOM intact.  Neck: Neck supple. JVP not elevated. No masses  Chest: Clear to auscultation bilaterally  CV: s1 s2 RRR no murmurs, rubs or gallops   Abdomen: Soft, non-distended, non-tender  Extremity : + PP no c/c/e   Skin: No rashes or skin breakdown  Neurology: Alert and oriented times three. Sensation intact  Psych: Affect normal                          8.1    8.30  )-----------( 356      ( 06 Sep 2022 05:40 )             25.0     09-06    135  |  96<L>  |  33.4<H>  ----------------------------<  92  4.5   |  29.0  |  2.12<H>    Ca    9.5      06 Sep 2022 05:40  Phos  4.1     09-06  Mg     1.7     09-06    TPro  6.3<L>  /  Alb  2.8<L>  /  TBili  0.4  /  DBili  x   /  AST  24  /  ALT  7   /  AlkPhos  300<H>  09-04      ASSESSMENT AND PLAN:    57 y/omale, PHM:  DM on Insulin, nephropathy, bilaterally foot ulcers form walking on hot pavement, multiple ulcers on finger tips in varying stages of healing, and a facial abscess drained at [facility], presented to [facility] with Increasing lower extremity edema and increasing sob.  Last Hospital admission was on 8/22/22 at [facility] for septic knee, sent home with midline for MSSA bacteremia on Rocephin    Rocephin 2 gram q 12 hrs to continue untill 09/07/22.   Patient states after discharge he noted the knee swelling was traveling down into his ankle and up over knee cap.  Then over the next few days at home his right ankle began to swell and felt warm as well.   Patient also states that his SOB has increased.  He was SOB x 6 months prior while lifting at work, but after hospital admission he is now unable to walk up a flight of stairs with feeling SOB that hd increased over the 4 days at home.  Also states he as difficulty lying down flat and feeling like he is choking.   Denies cp, palpitations,   fever, chills, jaw pain, back pain, abd. pain, n/v/d, hematemesis, hemoptysis, and rectal bleeding   A cardiac consult was called due to symptoms concerning for CHF and elevated troponin levels.  Patient has never seen a cardiologist, he is non smoker, has bad poor dentition, and  but found to have ESTUARDO and anemia since last admission which are new diagnosis's.  Patient also has a + premature CAD hx in father.  Patient also has a + premature CAD hx in father, elevated trops x 2, and bnp 4203.      -pt has been NPO since MN  -labs, ekg ordered  -consent to be obtained by attending and anesthesia  -LUIS order placed   -renal f/u appreciated  -cards following

## 2022-09-06 NOTE — PROGRESS NOTE ADULT - SUBJECTIVE AND OBJECTIVE BOX
HPI: The patient is a 57 year old male with PMH of DM Type 1, s/p appendectomy, chronic back pain on NSAIDs x 1 year, ? CKD with recent admission at [facility] in August 2022 for fever + chills + L knee pain s/p arthrocentesis which was negative for septic joint, however blood cultures were positive for MSSA, TTE negative for vegetation, d/c'ed on IV cefazolin. He presents with new onset RLE edema, admitted for acute decompensated heart failure. Nephrology is consulted for elevated creatinine. Patient denies history of CKD and hematuria. Admits to intermittent episodes of frothy urine. Denied difficulty with emptying bladder. Does admit to frequent NSAID use over the past 1 year due to chronic back pain. Family hx of ESRD in father (likely due to diabetic nephropathy).     PAST MEDICAL & SURGICAL HISTORY:  Type 1 diabetes  S/P appendectomy    Allergies:  No Known Allergies  Intolerances    Home Medications:  HumaLOG 100 units/mL injectable solution: 4 unit(s) injectable 3 times a day (before meals) (22 Aug 2022 10:03)  polyethylene glycol 3350 oral powder for reconstitution: 17 gram(s) orally once a day (30 Aug 2022 11:51)  Tresiba 100 units/mL subcutaneous solution: 28 unit(s) subcutaneous once a day (22 Aug 2022 10:03)    FAMILY HISTORY:  Family history of diabetes mellitus (DM) (Father)  FH: leukemia (Father)    Social History: Unclear     ROS: +RLE edema, no SOB, remainder as per HPI    Vital Signs Last 24 Hrs  T(C): 36.7 (04 Sep 2022 12:34), Max: 37.1 (04 Sep 2022 03:38)  T(F): 98.1 (04 Sep 2022 12:34), Max: 98.8 (04 Sep 2022 03:38)  HR: 81 (04 Sep 2022 12:34) (81 - 84)  BP: 164/85 (04 Sep 2022 12:34) (164/85 - 170/80)  RR: 18 (04 Sep 2022 12:34) (18 - 18)  SpO2: 98% (04 Sep 2022 12:34) (91% - 98%)    Parameters below as of 04 Sep 2022 12:34  Patient On (Oxygen Delivery Method): room air    I&O's Summary: Not recorded     09-04    135  |  98  |  29.6<H>  ----------------------------<  100<H>  4.7   |  26.0  |  1.85<H>    Ca    9.5      04 Sep 2022 09:15  Phos  3.5     09-04  Mg     1.8     09-04    TPro  6.0<L>  /  Alb  2.9<L>  /  TBili  0.3<L>  /  DBili  x   /  AST  25  /  ALT  8   /  AlkPhos  296<H>  09-04               7.4    12.01 )-----------( 380      ( 04 Sep 2022 09:15 )             22.6     MEDICATIONS  (STANDING):  buMETAnide 1 milliGRAM(s) Oral two times a day  ceFAZolin   IVPB 2000 milliGRAM(s) IV Intermittent every 12 hours  dextrose 5%. 1000 milliLiter(s) (50 mL/Hr) IV Continuous <Continuous>  dextrose 5%. 1000 milliLiter(s) (100 mL/Hr) IV Continuous <Continuous>  dextrose 50% Injectable 25 Gram(s) IV Push once  dextrose 50% Injectable 12.5 Gram(s) IV Push once  dextrose 50% Injectable 25 Gram(s) IV Push once  furosemide   Injectable 40 milliGRAM(s) IV Push daily  glucagon  Injectable 1 milliGRAM(s) IntraMuscular once  heparin   Injectable 5000 Unit(s) SubCutaneous every 8 hours  influenza   Vaccine 0.5 milliLiter(s) IntraMuscular once  insulin glargine Injectable (LANTUS) 14 Unit(s) SubCutaneous at bedtime  insulin lispro (ADMELOG) corrective regimen sliding scale   SubCutaneous three times a day before meals  insulin lispro (ADMELOG) corrective regimen sliding scale   SubCutaneous at bedtime  insulin lispro Injectable (ADMELOG) 2 Unit(s) SubCutaneous three times a day before meals  multivitamin 1 Tablet(s) Oral daily    MEDICATIONS  (PRN):  dextrose Oral Gel 15 Gram(s) Oral once PRN Blood Glucose LESS THAN 70 milliGRAM(s)/deciliter  oxycodone    5 mG/acetaminophen 325 mG 1 Tablet(s) Oral every 6 hours PRN Moderate Pain (4 - 6)  polyethylene glycol 3350 17 Gram(s) Oral daily PRN Constipation    US Renal (09.04.22 @ 19:36)   ACC: [id number] EXAM:  US KIDNEY(S)                          PROCEDURE DATE:  09/04/2022          INTERPRETATION:  CLINICAL INFORMATION: Elevated creatinine.    COMPARISON: None available.    TECHNIQUE: Sonography of the kidneys and bladder.    FINDINGS:  Right kidney: 11.3 cm. No renal mass, hydronephrosis or calculi.   Extrarenal pelvis.    Left kidney: 11.9 cm. No renal mass, hydronephrosis or calculi.    Urinary bladder: Within normal limits.    IMPRESSION:  No hydronephrosis.  Creatinine, Serum: 1.87 mg/dL (09.05.22 @ 05:48)       Historical Values  Creatinine, Serum: 2.12 mg/dL (09.06.22 @ 05:40)   Creatinine, Serum: 1.87 mg/dL (09.05.22 @ 05:48)   Creatinine, Serum: 1.82 mg/dL (09.04.22 @ 17:30)   Creatinine, Serum: 1.85 mg/dL (09.04.22 @ 09:15)       The patient is a 57 year old male with PMH of DM Type 1, s/p appendectomy, chronic back pain on NSAIDs x 1 year, ? CKD with recent admission at [facility] in August 2022 for fever + chills + L knee pain s/p arthrocentesis which was negative for septic joint, however blood cultures were positive for MSSA, TTE negative for vegetation, d/c'ed on IV cefazolin. He presents with new onset RLE edema, admitted for acute decompensated heart failure. Nephrology is consulted for elevated creatinine. Patient denies history of CKD and hematuria. Admits to intermittent episodes of frothy urine. Denied difficulty with emptying bladder. Does admit to frequent NSAID use over the past 1 year due to chronic back pain. Family hx of ESRD in father (likely due to diabetic nephropathy).     -Patient denied history of CKD   -In August 2022, creatinine ranged 2.2-2.3mg/dL  -While here, creatinine remains elevated but improving - latest creatinine is 1.85mg/dL  -? ESTUARDO vs ? CKD   -No urine studies available - will order UA, UPCR, UACR  -BP acceptable  -Currently on bumex 1mg PO BID and lasix 40mg IV daily - favor one loop diuretic at a time  -I/O's not recorded - will order   -On low sodium diet and 1.2L fluid restriction

## 2022-09-07 LAB
GLUCOSE BLDC GLUCOMTR-MCNC: 104 MG/DL — HIGH (ref 70–99)
GLUCOSE BLDC GLUCOMTR-MCNC: 135 MG/DL — HIGH (ref 70–99)
GLUCOSE BLDC GLUCOMTR-MCNC: 171 MG/DL — HIGH (ref 70–99)
GLUCOSE BLDC GLUCOMTR-MCNC: 225 MG/DL — HIGH (ref 70–99)

## 2022-09-07 PROCEDURE — 93018 CV STRESS TEST I&R ONLY: CPT

## 2022-09-07 PROCEDURE — 99232 SBSQ HOSP IP/OBS MODERATE 35: CPT

## 2022-09-07 PROCEDURE — 93016 CV STRESS TEST SUPVJ ONLY: CPT

## 2022-09-07 PROCEDURE — 78452 HT MUSCLE IMAGE SPECT MULT: CPT | Mod: 26

## 2022-09-07 RX ORDER — ATORVASTATIN CALCIUM 80 MG/1
40 TABLET, FILM COATED ORAL AT BEDTIME
Refills: 0 | Status: DISCONTINUED | OUTPATIENT
Start: 2022-09-07 | End: 2022-09-08

## 2022-09-07 RX ORDER — ASPIRIN/CALCIUM CARB/MAGNESIUM 324 MG
81 TABLET ORAL DAILY
Refills: 0 | Status: DISCONTINUED | OUTPATIENT
Start: 2022-09-07 | End: 2022-09-08

## 2022-09-07 RX ORDER — METOPROLOL TARTRATE 50 MG
25 TABLET ORAL DAILY
Refills: 0 | Status: DISCONTINUED | OUTPATIENT
Start: 2022-09-07 | End: 2022-09-08

## 2022-09-07 RX ORDER — IRON SUCROSE 20 MG/ML
200 INJECTION, SOLUTION INTRAVENOUS EVERY 24 HOURS
Refills: 0 | Status: DISCONTINUED | OUTPATIENT
Start: 2022-09-08 | End: 2022-09-08

## 2022-09-07 RX ADMIN — ATORVASTATIN CALCIUM 40 MILLIGRAM(S): 80 TABLET, FILM COATED ORAL at 21:10

## 2022-09-07 RX ADMIN — Medication 2 UNIT(S): at 11:08

## 2022-09-07 RX ADMIN — Medication 1 TABLET(S): at 11:00

## 2022-09-07 RX ADMIN — HEPARIN SODIUM 5000 UNIT(S): 5000 INJECTION INTRAVENOUS; SUBCUTANEOUS at 21:09

## 2022-09-07 RX ADMIN — Medication 25 MILLIGRAM(S): at 14:23

## 2022-09-07 RX ADMIN — HEPARIN SODIUM 5000 UNIT(S): 5000 INJECTION INTRAVENOUS; SUBCUTANEOUS at 05:13

## 2022-09-07 RX ADMIN — Medication 81 MILLIGRAM(S): at 14:22

## 2022-09-07 RX ADMIN — INSULIN GLARGINE 14 UNIT(S): 100 INJECTION, SOLUTION SUBCUTANEOUS at 21:09

## 2022-09-07 RX ADMIN — Medication 100 MILLIGRAM(S): at 05:13

## 2022-09-07 RX ADMIN — BUMETANIDE 1 MILLIGRAM(S): 0.25 INJECTION INTRAMUSCULAR; INTRAVENOUS at 05:13

## 2022-09-07 RX ADMIN — Medication 2 UNIT(S): at 17:28

## 2022-09-07 RX ADMIN — HEPARIN SODIUM 5000 UNIT(S): 5000 INJECTION INTRAVENOUS; SUBCUTANEOUS at 14:21

## 2022-09-07 RX ADMIN — Medication 100 MILLIGRAM(S): at 17:27

## 2022-09-07 NOTE — PROGRESS NOTE ADULT - SUBJECTIVE AND OBJECTIVE BOX
[facility]                                                         CARDIOLOGY AT [facility]                                                                  [address], [place] [address]                                                         Telephone: [phone]. Fax:[phone]                                                                             PROGRESS NOTE    Reason for follow up:  Acute Decompensated HFpEF  Update: Patient underwent a NST today with a small area of mild basal inferior wall ischemia, will plan to medically manage. Patient is asymptomatic, no chest pain or dyspnea.       Review of symptoms:   Cardiac:  No chest pain. No dyspnea. No palpitations.  Respiratory: no cough. No dyspnea  Gastrointestinal: No diarrhea. No abdominal pain. No bleeding.   Neuro: No focal neuro complaints.    Vitals:  T(C): 36.7 (09-07-22 @ 05:00), Max: 36.7 (09-06-22 @ 14:34)  HR: 73 (09-07-22 @ 05:00) (73 - 75)  BP: 145/75 (09-07-22 @ 05:00) (145/75 - 174/83)  RR: 20 (09-07-22 @ 05:00) (16 - 20)  SpO2: 95% (09-07-22 @ 05:00) (95% - 98%)  Wt(kg): --  I&O's Summary    06 Sep 2022 07:01  -  07 Sep 2022 07:00  --------------------------------------------------------  IN: 0 mL / OUT: 900 mL / NET: -900 mL      Weight (kg): 97.069 (09-03 @ 16:36)    PHYSICAL EXAM:  Appearance: Comfortable. No acute distress  HEENT:  Atraumatic. Normocephalic.  Normal oral mucosa  Neurologic: A & O x 3, no gross focal deficits.  Cardiovascular: RRR S1 S2, No murmur, no rubs/gallops. No JVD  Respiratory: Lungs clear to auscultation, unlabored   Gastrointestinal:  Soft, Non-tender, + BS  Lower Extremities: + edema  Psychiatry: Patient is calm. No agitation.   Skin: warm and dry.      CURRENT CARDIAC MEDICATIONS:  buMETAnide 1 milliGRAM(s) Oral daily  metoprolol succinate ER 25 milliGRAM(s) Oral daily      CURRENT OTHER MEDICATIONS:  ceFAZolin   IVPB 2000 milliGRAM(s) IV Intermittent every 12 hours  polyethylene glycol 3350 17 Gram(s) Oral daily PRN Constipation  atorvastatin 40 milliGRAM(s) Oral at bedtime  dextrose 50% Injectable 25 Gram(s) IV Push once, Stop order after: 1 Doses  dextrose 50% Injectable 12.5 Gram(s) IV Push once, Stop order after: 1 Doses  dextrose 50% Injectable 25 Gram(s) IV Push once, Stop order after: 1 Doses  dextrose Oral Gel 15 Gram(s) Oral once, Stop order after: 1 Doses PRN Blood Glucose LESS THAN 70 milliGRAM(s)/deciliter  glucagon  Injectable 1 milliGRAM(s) IntraMuscular once, Stop order after: 1 Doses  insulin glargine Injectable (LANTUS) 14 Unit(s) SubCutaneous at bedtime  insulin lispro (ADMELOG) corrective regimen sliding scale   SubCutaneous three times a day before meals  insulin lispro (ADMELOG) corrective regimen sliding scale   SubCutaneous at bedtime  insulin lispro Injectable (ADMELOG) 2 Unit(s) SubCutaneous three times a day before meals  aspirin enteric coated 81 milliGRAM(s) Oral daily  dextrose 5%. 1000 milliLiter(s) (50 mL/Hr) IV Continuous <Continuous>  dextrose 5%. 1000 milliLiter(s) (100 mL/Hr) IV Continuous <Continuous>  heparin   Injectable 5000 Unit(s) SubCutaneous every 8 hours  influenza   Vaccine 0.5 milliLiter(s) IntraMuscular once  multivitamin 1 Tablet(s) Oral daily      LABS:	 	  ( 05 Sep 2022 05:48 )  Troponin T  0.14<H>,  CPK  X    , CKMB  X    , BNP X        , ( 04 Sep 2022 09:15 )  Troponin T  0.12<H>,  CPK  X    , CKMB  X    , BNP X        , ( 04 Sep 2022 01:32 )  Troponin T  0.12<H>,  CPK  X    , CKMB  X    , BNP X                                  8.1    8.30  )-----------( 356      ( 06 Sep 2022 05:40 )             25.0     09-06    135  |  96<L>  |  33.4<H>  ----------------------------<  92  4.5   |  29.0  |  2.12<H>    Ca    9.5      06 Sep 2022 05:40  Phos  4.1     09-06  Mg     1.7     09-06      PT/INR/PTT ( 03 Sep 2022 17:52 )                       :                       :      12.4         :       30.1                  .        .                   .              .           .       1.07        .                                       Lipid Profile: Date: 09-04 @ 17:30  Total cholesterol 178; Direct LDL: --; HDL: 28; Triglycerides:135  Date: 09-04 @ 09:15  Total cholesterol 165; Direct LDL: --; HDL: 27; Triglycerides:122    HgA1c:   TSH:     TELEMETRY:   ECG:    DIAGNOSTIC TESTING:  [ ] Echocardiogram:   < from: LUIS Echo Doppler (09.06.22 @ 15:41) >  PHYSICIAN INTERPRETATION:  Left Ventricle: The left ventricular internal cavity size is normal.  Global LV systolic function was normal. Left ventricular ejection   fraction, by visual estimation, is 55 to 60%.  Right Ventricle: Normal right ventricular size and function.  Left Atrium: The left atrium is normal in size. Normal left atrial   appendage velocities and no left atrial appendage thrombus is seen. Color   flow doppler and intravenous injection of agitated saline demonstrates   the presence of an intact intra atrial septum.  Right Atrium: The right atrium is normal in size.  Pericardium: There is no evidence of pericardial effusion.  Mitral Valve: The mitral valve is normal in structure. Trace mitral valve   regurgitation is seen.  Tricuspid Valve: The tricuspid valve is normal in structure. Mild   tricuspid regurgitation is visualized.  Aortic Valve: The aortic valve is trileaflet. No evidence of aortic   stenosis. No evidence of aortic valve regurgitation is seen.  Pulmonic Valve: Structurally normal pulmonic valve, with normal leaflet   excursion.  Aorta: The aortic root, ascending aorta, aortic arch and descending aorta   are all structurally normal, with no evidence of dilitation or   obstruction. The aortic arch was not well visualized.  Venous: Three pulmonary veins are normal.  Shunts: Agitated saline contrast was given intravenously to evaluate for   intracardiac shunting. There is no evidence of a patent foramen ovale.      Summary:   1. Left ventricular ejection fraction, by visual estimation, is 55 to   60%.   2. Normal global left ventricular systolic function.   3. Normal right ventricular size and function.   4. There is no evidence of pericardial effusion.   5. Trace mitral valve regurgitation.   6. Mild tricuspid regurgitation.   7. Color flow doppler and intravenous injection of agitated saline   demonstrates the presence of an intact intra atrial septum.   8. No evidence of aortic stenosis.   9. No Vegetation or Vegetations any valves.  10. Normal left atrial appendage velocities and no left atrial appendage   thrombus.    MD Lanette Electronically signed on 9/6/2022 at5:11:43 PM      < end of copied text >    [ ]  Catheterization:  [ ] Stress Test:    < from: Nuclear Stress Test-Pharmacologic (09.07.22 @ 09:07) >  NUCLEAR FINDINGS:  Review of raw data shows: Minor motion artifact.  There is a small, moderate to severe defect in basal  inferior wall that is partially reversible, suggestive of  mild ischemia.  ------------------------------------------------------------------------      GATED ANALYSIS:  Gated wall motion analysis is performed, and shows normal  wall motion with post stress LVEF of 67%.  ------------------------------------------------------------------------      LV/RV OBSERVATION:  Normal LV ejection fraction.  ------------------------------------------------------------------------    IMPRESSIONS:  * Review of raw data shows: Minor motion artifact.  * There is a small, moderate to severe defect in basal  inferior wall that is partially reversible, suggestive of  mild ischemia.  * Gated wall motion analysis is performed, and shows  normal wall motion with post stress LVEF of 67%.  * Unable to walk on treadmill due to left knee pain  * Chest Pain: No chest pain with administration of  Regadenoson.  * Symptom: No Symptom.  * Heart Rhythm: Normal Sinus Rhythm.  * ECG Abnormalities: There were no diagnostic changes.  * Arrhythmia: None.    ------------------------------------------------------------------------      ------------------------------------------------------------------------    Confirmed on  9/7/2022 - 12:00:26 at [facility] by  Hayder Norris MD   Cardiology Fellow: Alexandra Gusman  ------------------------------------------------------------------------    < end of copied text >    OTHER:

## 2022-09-07 NOTE — PROGRESS NOTE ADULT - SUBJECTIVE AND OBJECTIVE BOX
Reason for visit:ESTUARDO    Subjective: No acute overnight event. Patient denied any cardiac or urinary complains. No fever/chills.     ROS: All systems were reviewed in detail and negative except as detailed in HPI.    Physical Exam:  Gen: no acute distress  MS: alert, conversing normally  Eyes: EOMI, no icterus  HENT: NCAT, MMM  CV: rhythm reg reg, rate normal, no m/g/r  Chest: CTAB, no w/r/r,  Abd: soft, NT, ND  Extremities: No edema    =======================================================  Vital Signs Last 24 Hrs  T(C): 37.1 (07 Sep 2022 14:33), Max: 37.1 (07 Sep 2022 14:33)  T(F): 98.7 (07 Sep 2022 14:33), Max: 98.7 (07 Sep 2022 14:33)  HR: 78 (07 Sep 2022 14:33) (73 - 78)  BP: 150/78 (07 Sep 2022 14:33) (145/75 - 150/78)  BP(mean): --  RR: 18 (07 Sep 2022 14:33) (18 - 20)  SpO2: 97% (07 Sep 2022 14:33) (95% - 97%)    Parameters below as of 07 Sep 2022 14:33  Patient On (Oxygen Delivery Method): room air      I&O's Summary    06 Sep 2022 07:01  -  07 Sep 2022 07:00  --------------------------------------------------------  IN: 0 mL / OUT: 900 mL / NET: -900 mL      =======================================================  Current Antibiotics:  ceFAZolin   IVPB 2000 milliGRAM(s) IV Intermittent every 12 hours    Other medications:  aspirin enteric coated 81 milliGRAM(s) Oral daily  atorvastatin 40 milliGRAM(s) Oral at bedtime  buMETAnide 1 milliGRAM(s) Oral daily  dextrose 5%. 1000 milliLiter(s) IV Continuous <Continuous>  dextrose 5%. 1000 milliLiter(s) IV Continuous <Continuous>  dextrose 50% Injectable 25 Gram(s) IV Push once  dextrose 50% Injectable 12.5 Gram(s) IV Push once  dextrose 50% Injectable 25 Gram(s) IV Push once  glucagon  Injectable 1 milliGRAM(s) IntraMuscular once  heparin   Injectable 5000 Unit(s) SubCutaneous every 8 hours  influenza   Vaccine 0.5 milliLiter(s) IntraMuscular once  insulin glargine Injectable (LANTUS) 14 Unit(s) SubCutaneous at bedtime  insulin lispro (ADMELOG) corrective regimen sliding scale   SubCutaneous three times a day before meals  insulin lispro (ADMELOG) corrective regimen sliding scale   SubCutaneous at bedtime  insulin lispro Injectable (ADMELOG) 2 Unit(s) SubCutaneous three times a day before meals  metoprolol succinate ER 25 milliGRAM(s) Oral daily  multivitamin 1 Tablet(s) Oral daily    =======================================================  09-06    135  |  96<L>  |  33.4<H>  ----------------------------<  92  4.5   |  29.0  |  2.12<H>    Ca    9.5      06 Sep 2022 05:40  Phos  4.1     09-06  Mg     1.7     09-06      Creatinine, Serum: 2.12 mg/dL (09-06-22 @ 05:40)  Creatinine, Serum: 1.87 mg/dL (09-05-22 @ 05:48)  Creatinine, Serum: 1.82 mg/dL (09-04-22 @ 17:30)  Creatinine, Serum: 1.85 mg/dL (09-04-22 @ 09:15)  Creatinine, Serum: 2.07 mg/dL (09-03-22 @ 17:52)      ======================================================= Reason for visit:ESTUARDO    Subjective: No acute overnight event. Patient denied any urinary complains. No fever/chills. SOB and edema getting better.    ROS: All systems were reviewed in detail and negative except as detailed in HPI.    Physical Exam:  Gen: no acute distress  MS: alert, conversing normally  Eyes: EOMI, no icterus  HENT: NCAT, MMM  CV: rhythm reg reg, rate normal, no m/g/r  Chest: CTAB, no w/r/r,  Abd: soft, NT, ND  Extremities: + edema    =======================================================  Vital Signs Last 24 Hrs  T(C): 37.1 (07 Sep 2022 14:33), Max: 37.1 (07 Sep 2022 14:33)  T(F): 98.7 (07 Sep 2022 14:33), Max: 98.7 (07 Sep 2022 14:33)  HR: 78 (07 Sep 2022 14:33) (73 - 78)  BP: 150/78 (07 Sep 2022 14:33) (145/75 - 150/78)  RR: 18 (07 Sep 2022 14:33) (18 - 20)  SpO2: 97% (07 Sep 2022 14:33) (95% - 97%)    Parameters below as of 07 Sep 2022 14:33  Patient On (Oxygen Delivery Method): room air      I&O's Summary    06 Sep 2022 07:01  -  07 Sep 2022 07:00  --------------------------------------------------------  IN: 0 mL / OUT: 900 mL / NET: -900 mL      =======================================================  Current Antibiotics:  ceFAZolin   IVPB 2000 milliGRAM(s) IV Intermittent every 12 hours    Other medications:  aspirin enteric coated 81 milliGRAM(s) Oral daily  atorvastatin 40 milliGRAM(s) Oral at bedtime  buMETAnide 1 milliGRAM(s) Oral daily  heparin   Injectable 5000 Unit(s) SubCutaneous every 8 hours  influenza   Vaccine 0.5 milliLiter(s) IntraMuscular once  insulin glargine Injectable (LANTUS) 14 Unit(s) SubCutaneous at bedtime  insulin lispro (ADMELOG) corrective regimen sliding scale   SubCutaneous three times a day before meals  insulin lispro (ADMELOG) corrective regimen sliding scale   SubCutaneous at bedtime  insulin lispro Injectable (ADMELOG) 2 Unit(s) SubCutaneous three times a day before meals  metoprolol succinate ER 25 milliGRAM(s) Oral daily  multivitamin 1 Tablet(s) Oral daily    =======================================================  09-06    135  |  96<L>  |  33.4<H>  ----------------------------<  92  4.5   |  29.0  |  2.12<H>    Ca    9.5      06 Sep 2022 05:40  Phos  4.1     09-06  Mg     1.7     09-06      Creatinine, Serum: 2.12 mg/dL (09-06-22 @ 05:40)  Creatinine, Serum: 1.87 mg/dL (09-05-22 @ 05:48)  Creatinine, Serum: 1.82 mg/dL (09-04-22 @ 17:30)  Creatinine, Serum: 1.85 mg/dL (09-04-22 @ 09:15)  Creatinine, Serum: 2.07 mg/dL (09-03-22 @ 17:52)      ======================================================= Reason for visit:ESTUARDO    Subjective: No acute overnight event. Patient denied any urinary complains. No fever/chills. SOB and edema getting better. Had stress test today which shows mild ischemia    ROS: All systems were reviewed in detail and negative except as detailed in HPI.    Physical Exam:  Gen: no acute distress  MS: alert, conversing normally  Eyes: EOMI, no icterus  HENT: NCAT, MMM  CV: rhythm reg reg, rate normal, no m/g/r  Chest: CTAB, no w/r/r,  Abd: soft, NT, ND  Extremities: + edema    =======================================================  Vital Signs Last 24 Hrs  T(C): 37.1 (07 Sep 2022 14:33), Max: 37.1 (07 Sep 2022 14:33)  T(F): 98.7 (07 Sep 2022 14:33), Max: 98.7 (07 Sep 2022 14:33)  HR: 78 (07 Sep 2022 14:33) (73 - 78)  BP: 150/78 (07 Sep 2022 14:33) (145/75 - 150/78)  RR: 18 (07 Sep 2022 14:33) (18 - 20)  SpO2: 97% (07 Sep 2022 14:33) (95% - 97%)    Parameters below as of 07 Sep 2022 14:33  Patient On (Oxygen Delivery Method): room air      I&O's Summary    06 Sep 2022 07:01  -  07 Sep 2022 07:00  --------------------------------------------------------  IN: 0 mL / OUT: 900 mL / NET: -900 mL      =======================================================  Current Antibiotics:  ceFAZolin   IVPB 2000 milliGRAM(s) IV Intermittent every 12 hours    Other medications:  aspirin enteric coated 81 milliGRAM(s) Oral daily  atorvastatin 40 milliGRAM(s) Oral at bedtime  buMETAnide 1 milliGRAM(s) Oral daily  heparin   Injectable 5000 Unit(s) SubCutaneous every 8 hours  influenza   Vaccine 0.5 milliLiter(s) IntraMuscular once  insulin glargine Injectable (LANTUS) 14 Unit(s) SubCutaneous at bedtime  insulin lispro (ADMELOG) corrective regimen sliding scale   SubCutaneous three times a day before meals  insulin lispro (ADMELOG) corrective regimen sliding scale   SubCutaneous at bedtime  insulin lispro Injectable (ADMELOG) 2 Unit(s) SubCutaneous three times a day before meals  metoprolol succinate ER 25 milliGRAM(s) Oral daily  multivitamin 1 Tablet(s) Oral daily    =======================================================  09-06    135  |  96<L>  |  33.4<H>  ----------------------------<  92  4.5   |  29.0  |  2.12<H>    Ca    9.5      06 Sep 2022 05:40  Phos  4.1     09-06  Mg     1.7     09-06      Creatinine, Serum: 2.12 mg/dL (09-06-22 @ 05:40)  Creatinine, Serum: 1.87 mg/dL (09-05-22 @ 05:48)  Creatinine, Serum: 1.82 mg/dL (09-04-22 @ 17:30)  Creatinine, Serum: 1.85 mg/dL (09-04-22 @ 09:15)  Creatinine, Serum: 2.07 mg/dL (09-03-22 @ 17:52)      =======================================================

## 2022-09-07 NOTE — PROGRESS NOTE ADULT - NS ATTEND AMEND GEN_ALL_CORE FT
LUIS results reviewed  Stress test with mild ischemia in the basal inferior wall. Denies any cp or sob.   Being managed medically.  We will follow with you.
Leg/ foot edema is improved.   Can now decrease diuretic dose.  Bacteremia, on ABX, fu repeat cultures   Plan for LUIS today.  Monitor Cr closely to adjust diuretic therapy  We will follow with you  OLIMPIA LING.
Pt seen and examined. Complains of left leg pain.   Diuresing well, leg edema is improved.  Cr is stable. Pt without any cp.   Mild elevation in troponin.   Plan for LUIS in AM, NPO post-midnight  Stress test on Wednesday to exclude ischemia  ABX for cellulitis. WBC is stable.   Pain mgmt  On heparin for DVT prophylaxis

## 2022-09-07 NOTE — PROGRESS NOTE ADULT - PROBLEM SELECTOR PLAN 4
- Continue Bumex to 1mg PO qday.   - Nephrology following.
- BUN/Cr are increasing today.   - Decreased Bumex to 1mg PO qday.   - Nephrology following.
.  - appreceiate nephrology consult  - lasix discontinued   - Cr 1.87 today

## 2022-09-07 NOTE — PROGRESS NOTE ADULT - SUBJECTIVE AND OBJECTIVE BOX
HAILEY CARD    [id number]    57y      Male    Patient is a 57y old  Male who presents with a chief complaint of new onset chf (07 Sep 2022 14:42)      INTERVAL HPI/OVERNIGHT EVENTS:    patient is feeling ok, his SOB is much better, as well as his edema, no overnight issues, denies chest pain, nausea, vomiting.     REVIEW OF SYSTEMS:    CONSTITUTIONAL: No fever, fatigue  RESPIRATORY: No cough, No shortness of breath  CARDIOVASCULAR: No chest pain, palpitations  GASTROINTESTINAL: No abdominal, No nausea, vomiting  NEUROLOGICAL: No headaches,  loss of strength.  MISCELLANEOUS: No joint swelling or pain        Vital Signs Last 24 Hrs  T(C): 37.1 (07 Sep 2022 14:33), Max: 37.1 (07 Sep 2022 14:33)  T(F): 98.7 (07 Sep 2022 14:33), Max: 98.7 (07 Sep 2022 14:33)  HR: 78 (07 Sep 2022 14:33) (73 - 78)  BP: 150/78 (07 Sep 2022 14:33) (145/75 - 150/78)  RR: 18 (07 Sep 2022 14:33) (18 - 20)  SpO2: 97% (07 Sep 2022 14:33) (95% - 97%)    Parameters below as of 07 Sep 2022 14:33  Patient On (Oxygen Delivery Method): room air        PHYSICAL EXAM:    GENERAL: Middle age male looking comfortable    HEENT: PERRL, +EOMI  NECK: soft, Supple, No JVD   CHEST/LUNG: Decrease air entry bilaterally; No wheezing  HEART: S1S2+, Regular rate and rhythm; No murmurs  ABDOMEN: Soft, Nontender, Nondistended; Bowel sounds present  EXTREMITIES:  1+ Peripheral Pulses, improving edema  SKIN: No rashes or lesions  NEURO: AAOX3, no focal deficits  PSYCH: normal mood    LABS:                        8.1    8.30  )-----------( 356      ( 06 Sep 2022 05:40 )             25.0     09-06    135  |  96<L>  |  33.4<H>  ----------------------------<  92  4.5   |  29.0  |  2.12<H>    Ca    9.5      06 Sep 2022 05:40  Phos  4.1     09-06  Mg     1.7     09-06              I&O's Summary    06 Sep 2022 07:01  -  07 Sep 2022 07:00  --------------------------------------------------------  IN: 0 mL / OUT: 900 mL / NET: -900 mL        MEDICATIONS  (STANDING):  aspirin enteric coated 81 milliGRAM(s) Oral daily  atorvastatin 40 milliGRAM(s) Oral at bedtime  buMETAnide 1 milliGRAM(s) Oral daily  ceFAZolin   IVPB 2000 milliGRAM(s) IV Intermittent every 12 hours  dextrose 5%. 1000 milliLiter(s) (50 mL/Hr) IV Continuous <Continuous>  dextrose 5%. 1000 milliLiter(s) (100 mL/Hr) IV Continuous <Continuous>  dextrose 50% Injectable 25 Gram(s) IV Push once  dextrose 50% Injectable 12.5 Gram(s) IV Push once  dextrose 50% Injectable 25 Gram(s) IV Push once  glucagon  Injectable 1 milliGRAM(s) IntraMuscular once  heparin   Injectable 5000 Unit(s) SubCutaneous every 8 hours  influenza   Vaccine 0.5 milliLiter(s) IntraMuscular once  insulin glargine Injectable (LANTUS) 14 Unit(s) SubCutaneous at bedtime  insulin lispro (ADMELOG) corrective regimen sliding scale   SubCutaneous three times a day before meals  insulin lispro (ADMELOG) corrective regimen sliding scale   SubCutaneous at bedtime  insulin lispro Injectable (ADMELOG) 2 Unit(s) SubCutaneous three times a day before meals  metoprolol succinate ER 25 milliGRAM(s) Oral daily  multivitamin 1 Tablet(s) Oral daily    MEDICATIONS  (PRN):  dextrose Oral Gel 15 Gram(s) Oral once PRN Blood Glucose LESS THAN 70 milliGRAM(s)/deciliter  polyethylene glycol 3350 17 Gram(s) Oral daily PRN Constipation

## 2022-09-07 NOTE — PROGRESS NOTE ADULT - ASSESSMENT
1. Acute kidney injury:   The patient is a 57 year old male with PMH of DM Type 1, s/p appendectomy, chronic back pain on NSAIDs x 1 year, ? CKD with recent admission at [facility] in August 2022 for fever + chills + L knee pain s/p arthrocentesis which was negative for septic joint, however blood cultures were positive for MSSA, TTE negative for vegetation, d/c'ed on IV cefazolin. He presents with new onset RLE edema, admitted for acute decompensated heart failure. Nephrology is consulted for elevated creatinine. Patient denies history of CKD and hematuria. Admits to intermittent episodes of frothy urine. Denied difficulty with emptying bladder. Does admit to frequent NSAID use over the past 1 year due to chronic back pain. Family hx of ESRD in father (likely due to diabetic nephropathy).     -Patient denied history of CKD   -In August 2022, creatinine ranged 2.2-2.3mg/dL  -While here, creatinine remains elevated but improving - latest creatinine is 1.85mg/dL  -? ESTUARDO vs ? CKD   -No urine studies available - will order UA, UPCR, UACR  -Will check renal ultrasound as well   -BP acceptable  -Currently on bumex 1mg PO BID and lasix 40mg IV daily - favor one loop diuretic at a time  -I/O's not recorded - will order   -On low sodium diet and 1.2L fluid restriction     1. Acute kidney injury:   yThe patient is a 57 year old male with PMH of DM Type 1, s/p appendectomy, chronic back pain on NSAIDs x 1 year, ? CKD with recent admission at [facility] in August 2022 for fever + chills + L knee pain s/p arthrocentesis which was negative for septic joint, however blood cultures were positive for MSSA, TTE negative for vegetation, d/c'ed on IV cefazolin. He presents with new onset RLE edema, admitted for acute decompensated heart failure. Nephrology is consulted for elevated creatinine. Patient denies history of CKD and hematuria. Admits to intermittent episodes of frothy urine. Denied difficulty with emptying bladder. Does admit to frequent NSAID use over the past 1 year due to chronic back pain. Family hx of ESRD in father (likely due to diabetic nephropathy).       1. Acute kidney injury on CKD, NOS: baseline likely 1.3-1.5. It was 1.36 mg/dl in Aug last year. It was elevated to 2.2-2.3 mg/dl in aug this year.  UA w/ mild proteinuria but no active sediment, UACR 657 mg/g  US renal : WNL  No labs today SCr 1.85-->1.82-->1.87-->2.12  Etiology for drop in SCr in unclear at present  Holding diuretic did not bring improvement in SCr  ? slow to improve ATN from previous admissions  Wait n watch approach for now, avoid nephrotoxins.    2. RLE edema; no DVT, ECHO normal EF, no diastolic HF. Consider downtrending bumex to 0.5 mg PO daily.    3. Anemia of iron deficiency: Saturation 9, ferritin 263. Will start on IV iron from AM.     4. Hypertension: BP slightly elevated. On metop. If stays high will add another antihypertensive tomorrow.

## 2022-09-07 NOTE — PROGRESS NOTE ADULT - ASSESSMENT
A/P: 56 y/o male pmh DM on Insulin, nephropathy, bilaterally foot ulcers form walking on hot pavement, multiple ulcers on finger tips in varying stages of healing, and a facial abscess drained at [facility]. Presented to [facility] with Increasing lower extremity edema and increasing sob.  Last Hospital admission was on 8/22/22 at [facility] for septic knee, sent home with midline for MSSA bacteremia on Rocephin 2 gram q 12 hrs to continue untill 09/07/22. c/o SOB x 6 mths, worse after recent admission, unable to climb flight of stairs wihtout dyspnea. + orthopnea. found to have ESTUARDO and anemia since last admission, + premature CAD hx in father, elevated troponin and bnp 4203

## 2022-09-07 NOTE — PROGRESS NOTE ADULT - ASSESSMENT
58 y/o with a history of diabetes, peripheral neuropathy came in to the ER for worsening lower ext. edema for past 1 + week, admitted for new onset CHF.     New onset of congestive heart failure:   - Trop elevated, BNP >4200   - Telemetry monitoring  - TTE reviewed 8/26 EF 55-60%, diastolic function appears normal  - Strict I/O, daily weights   - IV Lasix 40 mg daily switched to Bumex now on PO 1mg daily   LUIS done showed Left ventricular ejection fraction, by visual estimation, is 55 to 60%, Normal global left ventricular systolic function.  Color flow doppler and intravenous injection of agitated saline demonstrates the presence of an intact intra atrial septum.      Elevated troponin:   - No chest pain  - Cardiology consult appreciated   - stress test done showed Minor motion artifact, There is a small, moderate to severe defect in basal  inferior wall that is partially reversible, suggestive of mild ischemia.  * Gated wall motion analysis is performed, and shows normal wall motion with post stress LVEF of 67%.        Bacteremia:   - treated prior admission   - IV Cefazolin 2g q12h through 9/7 for MSSA bacteremia per ID, will continue   if spike fever will repeat cultures    DM 2:   - A1c 6.4  - FS with ISS  - Admelog 2 units TID with meals  - Lantus 14 units nightly     ESTUARDO vs CKD (chronic kidney disease):   - Avoid nephrotoxic medications  - Nephro consult appreciated   - creatrinine is 2.2 today, will continue to monitor will discuss with Nephrology about diuretics and worsening kidney function    Anemia  - Hgb 7.4 today Hb is 8.1  - Monitor CBC    DVT ppx - Heparin SQ   56 y/o with a history of diabetes, peripheral neuropathy came in to the ER for worsening lower ext. edema for past 1 + week, admitted for new onset CHF.     New onset of congestive heart failure:   - Trop elevated, BNP >4200   - Telemetry monitoring  - TTE reviewed 8/26 EF 55-60%, diastolic function appears normal  - Strict I/O, daily weights   - IV Lasix 40 mg daily switched to Bumex now on PO 1mg daily   LUIS done showed Left ventricular ejection fraction, by visual estimation, is 55 to 60%, Normal global left ventricular systolic function.  Color flow doppler and intravenous injection of agitated saline demonstrates the presence of an intact intra atrial septum.      Elevated troponin:   - No chest pain  - Cardiology consult appreciated   - stress test done showed Minor motion artifact, There is a small, moderate to severe defect in basal  inferior wall that is partially reversible, suggestive of mild ischemia.  * Gated wall motion analysis is performed, and shows normal wall motion with post stress LVEF of 67%.  as per cardio continue medical management        Bacteremia:   - treated prior admission   - IV Cefazolin 2g q12h through 9/7 for MSSA bacteremia per ID, will continue   if spike fever will repeat cultures    DM 2:   - A1c 6.4  - FS with ISS  - Admelog 2 units TID with meals  - Lantus 14 units nightly     ESTUARDO vs CKD (chronic kidney disease):   - Avoid nephrotoxic medications  - Nephro consult appreciated   - creatrinine is 2.2, will continue to monitor will discuss with Nephrology about diuretics and worsening kidney function    Anemia  - Hgb 7.4 today Hb is 8.1  - Monitor CBC    DVT ppx - Heparin SQ.

## 2022-09-08 ENCOUNTER — TRANSCRIPTION ENCOUNTER (OUTPATIENT)
Age: 57
End: 2022-09-08

## 2022-09-08 VITALS
OXYGEN SATURATION: 98 % | TEMPERATURE: 98 F | DIASTOLIC BLOOD PRESSURE: 48 MMHG | RESPIRATION RATE: 18 BRPM | HEART RATE: 76 BPM | SYSTOLIC BLOOD PRESSURE: 103 MMHG

## 2022-09-08 LAB
ALBUMIN SERPL ELPH-MCNC: 2.9 G/DL — LOW (ref 3.3–5.2)
ALP SERPL-CCNC: 194 U/L — HIGH (ref 40–120)
ALT FLD-CCNC: 5 U/L — SIGNIFICANT CHANGE UP
ANION GAP SERPL CALC-SCNC: 10 MMOL/L — SIGNIFICANT CHANGE UP (ref 5–17)
AST SERPL-CCNC: 17 U/L — SIGNIFICANT CHANGE UP
BILIRUB SERPL-MCNC: 0.3 MG/DL — LOW (ref 0.4–2)
BUN SERPL-MCNC: 32 MG/DL — HIGH (ref 8–20)
CALCIUM SERPL-MCNC: 9.4 MG/DL — SIGNIFICANT CHANGE UP (ref 8.4–10.5)
CHLORIDE SERPL-SCNC: 98 MMOL/L — SIGNIFICANT CHANGE UP (ref 98–107)
CO2 SERPL-SCNC: 28 MMOL/L — SIGNIFICANT CHANGE UP (ref 22–29)
CREAT SERPL-MCNC: 2.24 MG/DL — HIGH (ref 0.5–1.3)
EGFR: 34 ML/MIN/1.73M2 — LOW
GLUCOSE BLDC GLUCOMTR-MCNC: 104 MG/DL — HIGH (ref 70–99)
GLUCOSE SERPL-MCNC: 107 MG/DL — HIGH (ref 70–99)
HCT VFR BLD CALC: 24.1 % — LOW (ref 39–50)
HGB BLD-MCNC: 7.9 G/DL — LOW (ref 13–17)
MCHC RBC-ENTMCNC: 28.9 PG — SIGNIFICANT CHANGE UP (ref 27–34)
MCHC RBC-ENTMCNC: 32.8 GM/DL — SIGNIFICANT CHANGE UP (ref 32–36)
MCV RBC AUTO: 88.3 FL — SIGNIFICANT CHANGE UP (ref 80–100)
PLATELET # BLD AUTO: 281 K/UL — SIGNIFICANT CHANGE UP (ref 150–400)
POTASSIUM SERPL-MCNC: 4.6 MMOL/L — SIGNIFICANT CHANGE UP (ref 3.5–5.3)
POTASSIUM SERPL-SCNC: 4.6 MMOL/L — SIGNIFICANT CHANGE UP (ref 3.5–5.3)
PROT SERPL-MCNC: 5.8 G/DL — LOW (ref 6.6–8.7)
RBC # BLD: 2.73 M/UL — LOW (ref 4.2–5.8)
RBC # FLD: 12.4 % — SIGNIFICANT CHANGE UP (ref 10.3–14.5)
SODIUM SERPL-SCNC: 136 MMOL/L — SIGNIFICANT CHANGE UP (ref 135–145)
WBC # BLD: 6.72 K/UL — SIGNIFICANT CHANGE UP (ref 3.8–10.5)
WBC # FLD AUTO: 6.72 K/UL — SIGNIFICANT CHANGE UP (ref 3.8–10.5)

## 2022-09-08 PROCEDURE — 82962 GLUCOSE BLOOD TEST: CPT

## 2022-09-08 PROCEDURE — 0225U NFCT DS DNA&RNA 21 SARSCOV2: CPT

## 2022-09-08 PROCEDURE — 99232 SBSQ HOSP IP/OBS MODERATE 35: CPT

## 2022-09-08 PROCEDURE — 99285 EMERGENCY DEPT VISIT HI MDM: CPT

## 2022-09-08 PROCEDURE — 85025 COMPLETE CBC W/AUTO DIFF WBC: CPT

## 2022-09-08 PROCEDURE — 84100 ASSAY OF PHOSPHORUS: CPT

## 2022-09-08 PROCEDURE — 83550 IRON BINDING TEST: CPT

## 2022-09-08 PROCEDURE — 85045 AUTOMATED RETICULOCYTE COUNT: CPT

## 2022-09-08 PROCEDURE — 84466 ASSAY OF TRANSFERRIN: CPT

## 2022-09-08 PROCEDURE — 93325 DOPPLER ECHO COLOR FLOW MAPG: CPT

## 2022-09-08 PROCEDURE — 83880 ASSAY OF NATRIURETIC PEPTIDE: CPT

## 2022-09-08 PROCEDURE — 81001 URINALYSIS AUTO W/SCOPE: CPT

## 2022-09-08 PROCEDURE — 85027 COMPLETE CBC AUTOMATED: CPT

## 2022-09-08 PROCEDURE — 80048 BASIC METABOLIC PNL TOTAL CA: CPT

## 2022-09-08 PROCEDURE — 99239 HOSP IP/OBS DSCHRG MGMT >30: CPT

## 2022-09-08 PROCEDURE — 93017 CV STRESS TEST TRACING ONLY: CPT

## 2022-09-08 PROCEDURE — 80053 COMPREHEN METABOLIC PANEL: CPT

## 2022-09-08 PROCEDURE — 83735 ASSAY OF MAGNESIUM: CPT

## 2022-09-08 PROCEDURE — 82043 UR ALBUMIN QUANTITATIVE: CPT

## 2022-09-08 PROCEDURE — 82570 ASSAY OF URINE CREATININE: CPT

## 2022-09-08 PROCEDURE — 83540 ASSAY OF IRON: CPT

## 2022-09-08 PROCEDURE — 71045 X-RAY EXAM CHEST 1 VIEW: CPT

## 2022-09-08 PROCEDURE — 85730 THROMBOPLASTIN TIME PARTIAL: CPT

## 2022-09-08 PROCEDURE — A9500: CPT

## 2022-09-08 PROCEDURE — 84484 ASSAY OF TROPONIN QUANT: CPT

## 2022-09-08 PROCEDURE — 82550 ASSAY OF CK (CPK): CPT

## 2022-09-08 PROCEDURE — U0005: CPT

## 2022-09-08 PROCEDURE — 93970 EXTREMITY STUDY: CPT

## 2022-09-08 PROCEDURE — 87040 BLOOD CULTURE FOR BACTERIA: CPT

## 2022-09-08 PROCEDURE — 82728 ASSAY OF FERRITIN: CPT

## 2022-09-08 PROCEDURE — 82553 CREATINE MB FRACTION: CPT

## 2022-09-08 PROCEDURE — 36415 COLL VENOUS BLD VENIPUNCTURE: CPT

## 2022-09-08 PROCEDURE — 78452 HT MUSCLE IMAGE SPECT MULT: CPT

## 2022-09-08 PROCEDURE — 76775 US EXAM ABDO BACK WALL LIM: CPT

## 2022-09-08 PROCEDURE — 85610 PROTHROMBIN TIME: CPT

## 2022-09-08 PROCEDURE — 80061 LIPID PANEL: CPT

## 2022-09-08 PROCEDURE — 93005 ELECTROCARDIOGRAM TRACING: CPT

## 2022-09-08 PROCEDURE — U0003: CPT

## 2022-09-08 PROCEDURE — 84156 ASSAY OF PROTEIN URINE: CPT

## 2022-09-08 PROCEDURE — 93320 DOPPLER ECHO COMPLETE: CPT

## 2022-09-08 PROCEDURE — 93312 ECHO TRANSESOPHAGEAL: CPT

## 2022-09-08 PROCEDURE — 96374 THER/PROPH/DIAG INJ IV PUSH: CPT

## 2022-09-08 RX ORDER — FERROUS SULFATE 325(65) MG
1 TABLET ORAL
Qty: 30 | Refills: 0
Start: 2022-09-08 | End: 2022-10-07

## 2022-09-08 RX ORDER — ATORVASTATIN CALCIUM 80 MG/1
1 TABLET, FILM COATED ORAL
Qty: 30 | Refills: 0
Start: 2022-09-08 | End: 2022-10-07

## 2022-09-08 RX ORDER — BUMETANIDE 0.25 MG/ML
1 INJECTION INTRAMUSCULAR; INTRAVENOUS
Qty: 30 | Refills: 0
Start: 2022-09-08 | End: 2022-10-07

## 2022-09-08 RX ORDER — ASPIRIN/CALCIUM CARB/MAGNESIUM 324 MG
1 TABLET ORAL
Qty: 30 | Refills: 0
Start: 2022-09-08 | End: 2022-10-07

## 2022-09-08 RX ORDER — METOPROLOL TARTRATE 50 MG
1 TABLET ORAL
Qty: 30 | Refills: 0
Start: 2022-09-08 | End: 2022-10-07

## 2022-09-08 RX ADMIN — Medication 2 UNIT(S): at 08:28

## 2022-09-08 RX ADMIN — HEPARIN SODIUM 5000 UNIT(S): 5000 INJECTION INTRAVENOUS; SUBCUTANEOUS at 05:26

## 2022-09-08 RX ADMIN — Medication 25 MILLIGRAM(S): at 05:26

## 2022-09-08 RX ADMIN — BUMETANIDE 1 MILLIGRAM(S): 0.25 INJECTION INTRAMUSCULAR; INTRAVENOUS at 05:26

## 2022-09-08 RX ADMIN — IRON SUCROSE 110 MILLIGRAM(S): 20 INJECTION, SOLUTION INTRAVENOUS at 05:26

## 2022-09-08 NOTE — DISCHARGE NOTE PROVIDER - CARE PROVIDERS DIRECT ADDRESSES
[email or web address]

## 2022-09-08 NOTE — DISCHARGE NOTE PROVIDER - PROVIDER TOKENS
PROVIDER:[TOKEN:[[id number]],FOLLOWUP:[1 week]],PROVIDER:[TOKEN:[[id number]],FOLLOWUP:[2 weeks]],FREE:[LAST:[PCP],PHONE:[(   )    -],FAX:[(   )    -],FOLLOWUP:[1-3 days]]

## 2022-09-08 NOTE — DISCHARGE NOTE PROVIDER - NSDCCPCAREPLAN_GEN_ALL_CORE_FT
PRINCIPAL DISCHARGE DIAGNOSIS  Diagnosis: Acute decompensated heart failure  Assessment and Plan of Treatment: Will need to continue bumex 1mg daily, Toprol (metoprolol susinate) 25mg daily, Lipitor 40mg at bedtime, asparin daily.   Will need to continue to take iron due to anemia  follow up with PCP within 1 week to follow up with anemia   follow up with cardiology within 1-2 weeks  follow up with nephrology in 2 weeks

## 2022-09-08 NOTE — DISCHARGE NOTE NURSING/CASE MANAGEMENT/SOCIAL WORK - NSDCPEFALRISK_GEN_ALL_CORE
For information on Fall & Injury Prevention, visit: [email or web address] OR  [email or web address] OR  https://www.cdc.gov/steadi/patient.html

## 2022-09-08 NOTE — DISCHARGE NOTE PROVIDER - DISCHARGE DATE
[Menarche Age ____] : age at menarche was [unfilled] [Definite ___ (Date)] : the last menstrual period was [unfilled] [Irregular Cycle Intervals] : are  irregular 08-Sep-2022

## 2022-09-08 NOTE — PROGRESS NOTE ADULT - PROBLEM SELECTOR PLAN 3
- Troponin 0.12, 0.12, 0.14  - Elevated troponin in setting of ESTUARDO, anemia, bacteremia.   - NPO after midnight.   - Plan for NST tomorrow.   - cont to monitor on telemetry.
- Troponin 0.12, 0.12, 0.14  - Elevated troponin in setting of ESTUARDO, anemia, bacteremia.   - NST with mild basal inferior wall ischemia.   - Start aspirin 81mg PO qday, and check CBC in a week to evaluate H/H due to anemia.   - Start lipitor 40mg PO QHS and Toprol XL 25mg PO qday.   - Outpatient f/u with Cardiology in 1-2 weeks.
.  - blood cultures 8/26 no growth today  - repeat culture sent 9/4   - TTE limited f/u ordered  - LUIS tomorrow to evaluate for endocarditis
- Troponin 0.12, 0.12, 0.14  - Elevated troponin in setting of ESTUARDO, anemia, bacteremia.   - NST with mild basal inferior wall ischemia.   - Start aspirin 81mg PO qday, and check CBC in a week to evaluate H/H due to anemia.   - Start lipitor 40mg PO QHS and Toprol XL 25mg PO qday.   - Outpatient f/u with Cardiology in 1-2 weeks.

## 2022-09-08 NOTE — PROGRESS NOTE ADULT - PROVIDER SPECIALTY LIST ADULT
Cardiology
Hospitalist
Hospitalist
Nephrology
Hospitalist
Hospitalist
Nephrology
Cardiology
Cardiology

## 2022-09-08 NOTE — PROGRESS NOTE ADULT - ASSESSMENT
A/P: 58 y/o male pmh DM on Insulin, nephropathy, bilaterally foot ulcers form walking on hot pavement, multiple ulcers on finger tips in varying stages of healing, and a facial abscess drained at [facility]. Presented to [facility] with Increasing lower extremity edema and increasing sob.  Last Hospital admission was on 8/22/22 at [facility] for septic knee, sent home with midline for MSSA bacteremia on Rocephin 2 gram q 12 hrs to continue untill 09/07/22. c/o SOB x 6 mths, worse after recent admission, unable to climb flight of stairs wihtout dyspnea. + orthopnea. found to have ESTUARDO and anemia since last admission, + premature CAD hx in father, elevated troponin and bnp 4203

## 2022-09-08 NOTE — DISCHARGE NOTE PROVIDER - NSDCMRMEDTOKEN_GEN_ALL_CORE_FT
aspirin 81 mg oral delayed release tablet: 1 tab(s) orally once a day  atorvastatin 40 mg oral tablet: 1 tab(s) orally once a day (at bedtime)  bumetanide 1 mg oral tablet: 1 tab(s) orally once a day  ferrous sulfate 325 mg (65 mg elemental iron) oral delayed release tablet: 1 tab(s) orally once a day   HumaLOG 100 units/mL injectable solution: 4 unit(s) injectable 3 times a day (before meals)  metoprolol succinate 25 mg oral tablet, extended release: 1 tab(s) orally once a day  Multiple Vitamins oral tablet: 1 tab(s) orally once a day  oxycodone-acetaminophen 5 mg-325 mg oral tablet: 1 tab(s) orally every 6 hours, As Needed -Moderate Pain (4 - 6) MDD:4 tabs  polyethylene glycol 3350 oral powder for reconstitution: 17 gram(s) orally once a day  Tresiba 100 units/mL subcutaneous solution: 28 unit(s) subcutaneous once a day

## 2022-09-08 NOTE — DISCHARGE NOTE NURSING/CASE MANAGEMENT/SOCIAL WORK - PATIENT PORTAL LINK FT
You can access the FollowMyHealth Patient Portal offered by [facility] by registering at the following website: [email or web address]. By joining [employer]’s FollowMyHealth portal, you will also be able to view your health information using other applications (apps) compatible with our system.

## 2022-09-08 NOTE — PROGRESS NOTE ADULT - PROBLEM SELECTOR PLAN 1
.  - TTE 8/26 EF 55-60%, diastolic function appears normal  - elevated Pro BNP, troponin leak  - Lasix discontinued this am  - Continue Bumex 1mg BID  - monitor I and Os   - Plan for LUIS tomorrow - NPO after MN
- TTE 8/26 EF 55-60%, diastolic function appears normal.   - elevated Pro BNP, troponin leak  - BUN/Cr increasing today.   - Continue Bumex to 1mg PO qday.   - Monitor on telemetry.   - Strict i/o and daily weights.    - Keep K > 4, Mg > 2.    - Monitor renal function with ongoing diuresis.
- TTE 8/26 EF 55-60%, diastolic function appears normal.   - elevated Pro BNP, troponin leak  - BUN/Cr increasing today.   - Continue Bumex to 1mg PO qday.   - Monitor on telemetry.   - Strict i/o and daily weights.    - Keep K > 4, Mg > 2.    - Monitor renal function with ongoing diuresis.
- TTE 8/26 EF 55-60%, diastolic function appears normal.   - elevated Pro BNP, troponin leak  - BUN/Cr increasing today.   - Decrease Bumex to 1mg PO qday.   - Monitor on telemetry.   - Strict i/o and daily weights.    - Keep K > 4, Mg > 2.    - Monitor renal function with ongoing diuresis.

## 2022-09-08 NOTE — PROGRESS NOTE ADULT - PROBLEM SELECTOR PLAN 2
- blood cultures 8/26 no growth today  - repeat culture sent 9/4   - NPO after midnight.   - LUIS today to evaluate for endocarditis.
- LUIS negative for vegetations or endocarditis.   - ID following.   - Antibiotics per primary team.
- LUIS negative for vegetations or endocarditis.   - ID following.   - Antibiotics per primary team.
.  - Troponin 0.12, 0.12, 0.14  - Repeat EKG today   - elevated troponin in setting of ESTUARDO, anemia, bacteremia   - will need ischemic work up possible out patient   - cont to monitor on telemetry

## 2022-09-08 NOTE — PROGRESS NOTE ADULT - REASON FOR ADMISSION
new onset chf

## 2022-09-08 NOTE — DISCHARGE NOTE PROVIDER - HOSPITAL COURSE
58 y/o with a history of diabetes, peripheral neuropathy came in to the ER for worsening lower ext. edema for past 1 + week, admitted for new onset CHF.     *New onset of congestive heart failure:   Trop elevated, BNP >4200   TTE reviewed 8/26 EF 55-60%, diastolic function appears normal  IV Lasix 40 mg daily switched to Bumex now on PO 1mg daily   LUIS done showed Left ventricular ejection fraction, by visual estimation, is 55 to 60%, Normal global left ventricular systolic function.  Color flow doppler and intravenous injection of agitated saline demonstrates the presence of an intact intra atrial septum.      *Elevated troponin:   o chest pain  Cardiology consult appreciated   stress test done showed Minor motion artifact, There is a small, moderate to severe defect in basal  inferior wall that is partially reversible, suggestive of mild ischemia.  Gated wall motion analysis is performed, and shows normal wall motion with post stress LVEF of 67%.  as per cardio continue medical management    Bacteremia:   IV Cefazolin 2g q12h through 9/7 for MSSA bacteremia per ID, finished yesterday     DM 2:   A1c 6.4  FS with ISS  Admelog 2 units TID with meals  Lantus 14 units nightly     *ESTUARDO vs CKD (chronic kidney disease):   Avoid nephrotoxic medications  Nephro consult appreciated   will need to follow up as out pt     Anemia  Hgb 7.9 today Hb is 8.1      Vital Signs Last 24 Hrs  T(C): 36.8 (08 Sep 2022 05:22), Max: 37.1 (07 Sep 2022 14:33)  T(F): 98.2 (08 Sep 2022 05:22), Max: 98.7 (07 Sep 2022 14:33)  HR: 71 (08 Sep 2022 05:22) (71 - 78)  BP: 160/76 (08 Sep 2022 05:22) (150/78 - 160/82)  BP(mean): --  RR: 18 (08 Sep 2022 05:22) (18 - 18)  SpO2: 97% (08 Sep 2022 05:22) (96% - 97%)    Parameters below as of 08 Sep 2022 05:22  Patient On (Oxygen Delivery Method): room air          PHYSICAL EXAM:    GENERAL: NAD, well-groomed, well-developed  HEAD:  NC/AT  EYES: EOMI, PERRLA, no scleral icterus  HEENT: Moist mucous membranes  NECK: Supple, No JVD  CNS:  Alert & Oriented X3,    LUNG: Clear to auscultation bilaterally; No rales, rhonchi, wheezing, or rubs  HEART: RRR; No murmurs, rubs, or gallops  ABDOMEN: +BS, ST/ND/NT  GENITOURINARY- Voiding, Bladder not distended  EXTREMITIES:  2+ Peripheral Pulses, No clubbing, cyanosis, or edema  MUSCULOSKELTAL- Joints normal ROM, no Muscle or joint tenderness    Time spent on discharge 36min

## 2022-09-08 NOTE — DISCHARGE NOTE PROVIDER - CARE PROVIDER_API CALL
Hayder Norris)  Cardiovascular Disease  [address], [address]  [place] [address]  Phone: [phone]  Fax: [phone]  Follow Up Time: 1 week    Erin Jordan)  Internal Medicine; Nephrology  [address] [place] [address]  Phone: [phone]  Fax: [phone]  Follow Up Time: 2 weeks    PCP,   Phone: (   )    -  Fax: (   )    -  Follow Up Time: 1-3 days

## 2022-09-08 NOTE — PROGRESS NOTE ADULT - SUBJECTIVE AND OBJECTIVE BOX
[facility]                                                         CARDIOLOGY AT [facility]                                                                  [address], [place] [address]                                                         Telephone: [phone]. Fax:[phone]                                                                             PROGRESS NOTE    Reason for follow up: Acute Decompensated HFpEF  Update: Patient underwent a NST yesterday with a small area of mild basal inferior wall ischemia, will plan to medically manage. Patient is asymptomatic, no chest pain or dyspnea.       Review of symptoms:   Cardiac:  No chest pain. No dyspnea. No palpitations.  Respiratory: no cough. No dyspnea  Gastrointestinal: No diarrhea. No abdominal pain. No bleeding.   Neuro: No focal neuro complaints.    Vitals:  T(C): 36.8 (09-08-22 @ 05:22), Max: 37.1 (09-07-22 @ 14:33)  HR: 71 (09-08-22 @ 05:22) (71 - 78)  BP: 160/76 (09-08-22 @ 05:22) (150/78 - 160/82)  RR: 18 (09-08-22 @ 05:22) (18 - 18)  SpO2: 97% (09-08-22 @ 05:22) (96% - 97%)  Wt(kg): --  I&O's Summary    07 Sep 2022 07:01  -  08 Sep 2022 07:00  --------------------------------------------------------  IN: 0 mL / OUT: 800 mL / NET: -800 mL      Weight (kg): 97.069 (09-03 @ 16:36)    PHYSICAL EXAM:  Appearance: Comfortable. No acute distress  HEENT:  Atraumatic. Normocephalic.  Normal oral mucosa  Neurologic: A & O x 3, no gross focal deficits.  Cardiovascular: RRR S1 S2, No murmur, no rubs/gallops. No JVD  Respiratory: Lungs clear to auscultation, unlabored   Gastrointestinal:  Soft, Non-tender, + BS  Lower Extremities: + edema, resolving  Psychiatry: Patient is calm. No agitation.   Skin: warm and dry.    CURRENT CARDIAC MEDICATIONS:  buMETAnide 1 milliGRAM(s) Oral daily  metoprolol succinate ER 25 milliGRAM(s) Oral daily      CURRENT OTHER MEDICATIONS:  polyethylene glycol 3350 17 Gram(s) Oral daily PRN Constipation  atorvastatin 40 milliGRAM(s) Oral at bedtime  dextrose 50% Injectable 25 Gram(s) IV Push once, Stop order after: 1 Doses  dextrose 50% Injectable 12.5 Gram(s) IV Push once, Stop order after: 1 Doses  dextrose 50% Injectable 25 Gram(s) IV Push once, Stop order after: 1 Doses  dextrose Oral Gel 15 Gram(s) Oral once, Stop order after: 1 Doses PRN Blood Glucose LESS THAN 70 milliGRAM(s)/deciliter  glucagon  Injectable 1 milliGRAM(s) IntraMuscular once, Stop order after: 1 Doses  insulin glargine Injectable (LANTUS) 14 Unit(s) SubCutaneous at bedtime  insulin lispro (ADMELOG) corrective regimen sliding scale   SubCutaneous three times a day before meals  insulin lispro (ADMELOG) corrective regimen sliding scale   SubCutaneous at bedtime  insulin lispro Injectable (ADMELOG) 2 Unit(s) SubCutaneous three times a day before meals  aspirin enteric coated 81 milliGRAM(s) Oral daily  dextrose 5%. 1000 milliLiter(s) (50 mL/Hr) IV Continuous <Continuous>  dextrose 5%. 1000 milliLiter(s) (100 mL/Hr) IV Continuous <Continuous>  heparin   Injectable 5000 Unit(s) SubCutaneous every 8 hours  influenza   Vaccine 0.5 milliLiter(s) IntraMuscular once  iron sucrose IVPB 200 milliGRAM(s) IV Intermittent every 24 hours, Stop order after: 4 Doses  multivitamin 1 Tablet(s) Oral daily      LABS:	 	  ( 05 Sep 2022 05:48 )  Troponin T  0.14<H>,  CPK  X    , CKMB  X    , BNP X        , ( 04 Sep 2022 09:15 )  Troponin T  0.12<H>,  CPK  X    , CKMB  X    , BNP X        , ( 04 Sep 2022 01:32 )  Troponin T  0.12<H>,  CPK  X    , CKMB  X    , BNP X                                  7.9    6.72  )-----------( 281      ( 08 Sep 2022 05:40 )             24.1     09-08    136  |  98  |  32.0<H>  ----------------------------<  107<H>  4.6   |  28.0  |  2.24<H>    Ca    9.4      08 Sep 2022 05:40    TPro  5.8<L>  /  Alb  2.9<L>  /  TBili  0.3<L>  /  DBili  x   /  AST  17  /  ALT  5   /  AlkPhos  194<H>  09-08    PT/INR/PTT ( 03 Sep 2022 17:52 )                       :                       :      12.4         :       30.1                  .        .                   .              .           .       1.07        .                                       Lipid Profile: Date: 09-04 @ 17:30  Total cholesterol 178; Direct LDL: --; HDL: 28; Triglycerides:135  Date: 09-04 @ 09:15  Total cholesterol 165; Direct LDL: --; HDL: 27; Triglycerides:122    HgA1c:   TSH:     TELEMETRY: SR  ECG:    DIAGNOSTIC TESTING:  [ ] Echocardiogram:   < from: [facility] Echo Doppler (09.06.22 @ 15:41) >  PHYSICIAN INTERPRETATION:  Left Ventricle: The left ventricular internal cavity size is normal.  Global LV systolic function was normal. Left ventricular ejection   fraction, by visual estimation, is 55 to 60%.  Right Ventricle: Normal right ventricular size and function.  Left Atrium: The left atrium is normal in size. Normal left atrial   appendage velocities and no left atrial appendage thrombus is seen. Color   flow doppler and intravenous injection of agitated saline demonstrates   the presence of an intact intra atrial septum.  Right Atrium: The right atrium is normal in size.  Pericardium: There is no evidence of pericardial effusion.  Mitral Valve: The mitral valve is normal in structure. Trace mitral valve   regurgitation is seen.  Tricuspid Valve: The tricuspid valve is normal in structure. Mild   tricuspid regurgitation is visualized.  Aortic Valve: The aortic valve is trileaflet. No evidence of aortic   stenosis. No evidence of aortic valve regurgitation is seen.  Pulmonic Valve: Structurally normal pulmonic valve, with normal leaflet   excursion.  Aorta: The aortic root, ascending aorta, aortic arch and descending aorta   are all structurally normal, with no evidence of dilitation or   obstruction. The aortic arch was not well visualized.  Venous: Three pulmonary veins are normal.  Shunts: Agitated saline contrast was given intravenously to evaluate for   intracardiac shunting. There is no evidence of a patent foramen ovale.      Summary:   1. Left ventricular ejection fraction, by visual estimation, is 55 to   60%.   2. Normal global left ventricular systolic function.   3. Normal right ventricular size and function.   4. There is no evidence of pericardial effusion.   5. Trace mitral valve regurgitation.   6. Mild tricuspid regurgitation.   7. Color flow doppler and intravenous injection of agitated saline   demonstrates the presence of an intact intra atrial septum.   8. No evidence of aortic stenosis.   9. No Vegetation or Vegetations any valves.  10. Normal left atrial appendage velocities and no left atrial appendage   thrombus.    MD Lanette Electronically signed on 9/6/2022 at5:11:43 PM      < end of copied text >    [ ]  Catheterization:  [ ] Stress Test:    < from: Nuclear Stress Test-Pharmacologic (09.07.22 @ 09:07) >  NUCLEAR FINDINGS:  Review of raw data shows: Minor motion artifact.  There is a small, moderate to severe defect in basal  inferior wall that is partially reversible, suggestive of  mild ischemia.  ------------------------------------------------------------------------      GATED ANALYSIS:  Gated wall motion analysis is performed, and shows normal  wall motion with post stress LVEF of 67%.  ------------------------------------------------------------------------      LV/RV OBSERVATION:  Normal LV ejection fraction.  ------------------------------------------------------------------------    IMPRESSIONS:  * Review of raw data shows: Minor motion artifact.  * There is a small, moderate to severe defect in basal  inferior wall that is partially reversible, suggestive of  mild ischemia.  * Gated wall motion analysis is performed, and shows  normal wall motion with post stress LVEF of 67%.  * Unable to walk on treadmill due to left knee pain  * Chest Pain: No chest pain with administration of  Regadenoson.  * Symptom: No Symptom.  * Heart Rhythm: Normal Sinus Rhythm.  * ECG Abnormalities: There were no diagnostic changes.  * Arrhythmia: None.    ------------------------------------------------------------------------      ------------------------------------------------------------------------    Confirmed on  9/7/2022 - 12:00:26 at [facility] Cardiology by  Hayder Norris MD   Cardiology Fellow: Alexandra Gusman  ------------------------------------------------------------------------    < end of copied text >    OTHER:

## 2022-09-09 LAB
14-3-3 ETA ANTIGEN: <0.2 NG/ML — SIGNIFICANT CHANGE UP
CCP ANTIBODIES IGG/IGA: <20 UNITS — SIGNIFICANT CHANGE UP
RHEUMATOID FACTOR IDENTRA RESULT: 14.2 UNITS/ML — HIGH

## 2022-09-10 LAB
CULTURE RESULTS: SIGNIFICANT CHANGE UP
CULTURE RESULTS: SIGNIFICANT CHANGE UP
SPECIMEN SOURCE: SIGNIFICANT CHANGE UP
SPECIMEN SOURCE: SIGNIFICANT CHANGE UP

## 2022-09-15 ENCOUNTER — APPOINTMENT (OUTPATIENT)
Dept: CARDIOLOGY | Facility: CLINIC | Age: 57
End: 2022-09-15

## 2022-09-26 ENCOUNTER — APPOINTMENT (OUTPATIENT)
Dept: CARDIOLOGY | Facility: CLINIC | Age: 57
End: 2022-09-26

## 2022-09-30 RX ORDER — INSULIN LISPRO 100 [IU]/ML
100 INJECTION, SOLUTION INTRAVENOUS; SUBCUTANEOUS
Qty: 3 | Refills: 1 | Status: ACTIVE | COMMUNITY
Start: 2020-10-06 | End: 1900-01-01

## 2022-09-30 RX ORDER — PEN NEEDLE, DIABETIC 32 GX 1/4"
32G X 6 MM NEEDLE, DISPOSABLE MISCELLANEOUS
Qty: 4 | Refills: 0 | Status: ACTIVE | COMMUNITY
Start: 2020-10-06 | End: 1900-01-01

## 2022-11-07 ENCOUNTER — RX RENEWAL (OUTPATIENT)
Age: 57
End: 2022-11-07

## 2022-11-08 ENCOUNTER — APPOINTMENT (OUTPATIENT)
Dept: ENDOCRINOLOGY | Facility: CLINIC | Age: 57
End: 2022-11-08

## 2022-11-08 VITALS — OXYGEN SATURATION: 99 % | DIASTOLIC BLOOD PRESSURE: 82 MMHG | HEART RATE: 67 BPM | SYSTOLIC BLOOD PRESSURE: 140 MMHG

## 2022-11-08 VITALS — BODY MASS INDEX: 27.09 KG/M2 | WEIGHT: 200 LBS | HEIGHT: 72 IN

## 2022-11-08 DIAGNOSIS — E78.5 HYPERLIPIDEMIA, UNSPECIFIED: ICD-10-CM

## 2022-11-08 DIAGNOSIS — Z86.39 PERSONAL HISTORY OF OTHER ENDOCRINE, NUTRITIONAL AND METABOLIC DISEASE: ICD-10-CM

## 2022-11-08 DIAGNOSIS — E11.9 TYPE 2 DIABETES MELLITUS W/OUT COMPLICATIONS: ICD-10-CM

## 2022-11-08 DIAGNOSIS — E55.9 VITAMIN D DEFICIENCY, UNSPECIFIED: ICD-10-CM

## 2022-11-08 DIAGNOSIS — E11.65 TYPE 2 DIABETES MELLITUS WITH HYPERGLYCEMIA: ICD-10-CM

## 2022-11-08 DIAGNOSIS — E11.40 TYPE 2 DIABETES MELLITUS WITH DIABETIC NEUROPATHY, UNSPECIFIED: ICD-10-CM

## 2022-11-08 PROCEDURE — 99214 OFFICE O/P EST MOD 30 MIN: CPT

## 2022-11-08 RX ORDER — METOPROLOL SUCCINATE 25 MG/1
25 TABLET, EXTENDED RELEASE ORAL
Qty: 30 | Refills: 0 | Status: ACTIVE | COMMUNITY
Start: 2022-10-05

## 2022-11-08 RX ORDER — CLOPIDOGREL BISULFATE 75 MG/1
75 TABLET, FILM COATED ORAL
Qty: 30 | Refills: 0 | Status: ACTIVE | COMMUNITY
Start: 2022-10-05

## 2022-11-08 RX ORDER — ATORVASTATIN CALCIUM 20 MG/1
20 TABLET, FILM COATED ORAL
Qty: 90 | Refills: 1 | Status: DISCONTINUED | COMMUNITY
Start: 2020-09-28 | End: 2022-11-08

## 2022-11-08 RX ORDER — ATORVASTATIN CALCIUM 80 MG/1
80 TABLET, FILM COATED ORAL
Qty: 90 | Refills: 0 | Status: ACTIVE | COMMUNITY
Start: 2022-10-05

## 2022-11-08 RX ORDER — SULFAMETHOXAZOLE AND TRIMETHOPRIM 800; 160 MG/1; MG/1
800-160 TABLET ORAL TWICE DAILY
Qty: 14 | Refills: 0 | Status: DISCONTINUED | COMMUNITY
Start: 2021-10-07 | End: 2022-11-08

## 2022-11-08 NOTE — REVIEW OF SYSTEMS
[Recent Weight Gain (___ Lbs)] : recent weight gain: [unfilled] lbs [SOB on Exertion] : shortness of breath on exertion [Fatigue] : no fatigue [Recent Weight Loss (___ Lbs)] : no recent weight loss [Visual Field Defect] : no visual field defect [Blurred Vision] : no blurred vision [Dysphagia] : no dysphagia [Neck Pain] : no neck pain [Dysphonia] : no dysphonia [Chest Pain] : no chest pain [Palpitations] : no palpitations [Constipation] : no constipation [Diarrhea] : no diarrhea [Polyuria] : no polyuria [Polydipsia] : no polydipsia

## 2022-11-08 NOTE — HISTORY OF PRESENT ILLNESS
[FreeTextEntry1] : Pt lost to follow up since 8/2021 \par Now is a grandfather to twins\par Went to hospital in September for bacterial infection and a swollen knee and ankles (heart failure)\par Had cardiac cath x2 this fall and had 2 stents placed - now on plavix \par Still has healing chronic foot ulcer \par \par Did labs with cardiologist this morning (unsure if they did HGA1C)\par \par Quality: type 2 (positive c peptide, FELA negative)\par Onset: 2010\par Severity: severe\par Modifying factors: on insulin\par Father with type 1 DM\par \par SMBG\par Checks blood sugars fasting 1x a day-under 100-130 \par Endorses some morning hypoglycemia\par \par FS in office 147 and he had 2 slices of pizza?\par \par Need updated HGA1C \par \par Current drug regimen\par Tresiba 24 units daily \par Does not require Humalog at this time  (although hes not checking throughout the day) \par \par Eye exam: goes every other monthly for injections +DR, needs lazer treatment\par Foot exam: now follows closely with podiatry- endorses numbness/tingling in b/l feet \par Kidney disease: denies\par Heart disease:denies\par \par Weight: has gained 20 lbs in the year\par Diet: no longer binge eating\par eats less carbs\par drinking water \par Exercise: plans for cardiac rehab \par Smoking: denies\par \par HLD\par Need updated lipid panel \par On atorvastatin 80 mg daily \par \par Vit D Def\par Need updated levels with labds \par On no supplementation

## 2022-11-08 NOTE — ASSESSMENT
[FreeTextEntry1] : Pt seems to be doing dramatically better, he is now admitting just how horrible his diet was in 2020/2021 (he would go to [employer] 3 nights a week etc). He did stop his meal insulin on his own because he would have had hypoglycemic events if he continued to take it but he now needs less basal insulin. I would also like to consider adding oral meds and transition off insulin after a full set of labs is done and resulted. \par \par Communicated with [facility] to get pt a PCP.\par \par T2DM\par -Long discussion had with patient regarding the need for tight diabetes control both for short term  healing of wounds and long term prevention of further DM complications \par -For now, blood sugars appear at goal and even at risk for hypoglcyemia \par -Increase FS frequency to 3-4x a day and drop off logs every week\par -Decrease Tresiba to 18 units \par -Watch diet and the alcohol consumption as they make blood sugars fluctuate\par -Increase exercise as tolerated, goal of 30 mins a day 5x a week when cleared\par -Continue to follow up with all specialists including ophtho and podiatry\par \par HLD\par -Continue lipitor 80 mg daily as per cardiology \par \par Vit D Def\par -Will check updated levels with labs\par \par RTO 4-6 weeks

## 2022-11-09 ENCOUNTER — RX RENEWAL (OUTPATIENT)
Age: 57
End: 2022-11-09

## 2022-11-09 RX ORDER — INSULIN DEGLUDEC INJECTION 100 U/ML
100 INJECTION, SOLUTION SUBCUTANEOUS DAILY
Qty: 2 | Refills: 0 | Status: ACTIVE | COMMUNITY
Start: 2020-09-25 | End: 1900-01-01

## 2023-01-06 ENCOUNTER — APPOINTMENT (OUTPATIENT)
Dept: ENDOCRINOLOGY | Facility: CLINIC | Age: 58
End: 2023-01-06

## 2023-08-10 NOTE — PATIENT PROFILE ADULT - DO YOU LACK THE NECESSARY SUPPORT TO HELP YOU COPE WITH LIFE CHALLENGES?
Patient is a 85 year old male with PMH of HTN, HLD, pre-DM, BPH, COPD, ?ILD, pleural plaques 2/2 asbestosis, former smoker, colonic lesion (?neoplasm), pulm nodule, thyroid nodule, diastolic dysfunction, aortic ectasia, cellulitis, presenting with generalized weakness, fevers, and chills for day. Patient admitted for severe sepsis vs SIRS.     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 Has atelectasis reported, no pneumonia.  - s/p vancomycin and pip-tazo   - WBC downtrended/stable, afebrile x24h  - MRSA PCR screen negative     Recommendations:  CRS following, no acute surgical intervention  Follow up cultures - NGTD   Continue on pip-tazo for now   Monitor temps/CBC  Continue rest of care per primary team      Doris Connelly M.D.  [facility], Division of Infectious Diseases  [phone]  After 5pm on weekdays and all day on weekends - please call [phone]     no

## 2024-01-01 NOTE — CHART NOTE - NSCHARTNOTEFT_GEN_A_CORE
I spoke with Ortho  PA about patient spiking fever  despite antibiotics. concerning for abscess formation  He said obtain an CT, if abbess of soft tissue need to consult General Surgery .
You can access the FollowMyHealth Patient Portal offered by [facility] by registering at the following website: [email or web address]. By joining [email or web address]’s FollowMyHealth portal, you will also be able to view your health information using other applications (apps) compatible with our system.